# Patient Record
Sex: MALE | Race: WHITE | NOT HISPANIC OR LATINO | Employment: OTHER | URBAN - METROPOLITAN AREA
[De-identification: names, ages, dates, MRNs, and addresses within clinical notes are randomized per-mention and may not be internally consistent; named-entity substitution may affect disease eponyms.]

---

## 2019-01-07 ENCOUNTER — TELEPHONE (OUTPATIENT)
Dept: INTERNAL MEDICINE | Facility: CLINIC | Age: 64
End: 2019-01-07

## 2019-01-07 NOTE — TELEPHONE ENCOUNTER
PT RECEIVED CALL FROM PHARMACY STATING LOSARTAN HAD BEEN RECALLED. TOOK LAST DOSE LAST NIGHT. NEEDS TO HAVE SOMETHING ELSE CALLED IN TO [facility].

## 2019-01-08 RX ORDER — OLMESARTAN MEDOXOMIL 20 MG/1
20 TABLET ORAL DAILY
Qty: 30 TABLET | Refills: 5 | Status: SHIPPED | OUTPATIENT
Start: 2019-01-08 | End: 2019-03-15

## 2019-03-15 ENCOUNTER — TELEPHONE (OUTPATIENT)
Dept: INTERNAL MEDICINE | Facility: CLINIC | Age: 64
End: 2019-03-15

## 2019-03-15 RX ORDER — LOSARTAN POTASSIUM 50 MG/1
50 TABLET ORAL DAILY
Qty: 90 TABLET | Refills: 3 | Status: SHIPPED | OUTPATIENT
Start: 2019-03-15 | End: 2020-03-13

## 2019-03-15 NOTE — TELEPHONE ENCOUNTER
PATIENT HAS BEEN PRESCRIBED OLMESARTAN  AND IT IS ON BACK ORDER    PLEASE ADVISE ON AN ALTERNATIVE DRUG

## 2019-04-26 PROBLEM — F32.A DEPRESSION: Status: ACTIVE | Noted: 2019-04-26

## 2019-04-26 PROBLEM — M50.90 DISC DISORDER OF CERVICAL REGION: Status: ACTIVE | Noted: 2019-04-26

## 2019-04-26 PROBLEM — Z00.00 ANNUAL PHYSICAL EXAM: Status: ACTIVE | Noted: 2019-04-26

## 2019-04-26 PROBLEM — E78.00 HYPERCHOLESTEROLEMIA: Status: ACTIVE | Noted: 2019-04-26

## 2019-04-26 PROBLEM — N18.30 CHRONIC KIDNEY DISEASE, STAGE III (MODERATE): Status: ACTIVE | Noted: 2019-04-26

## 2019-04-26 PROBLEM — I10 BENIGN ESSENTIAL HYPERTENSION: Status: ACTIVE | Noted: 2019-04-26

## 2019-04-26 PROBLEM — K58.9 IRRITABLE BOWEL SYNDROME: Status: ACTIVE | Noted: 2019-04-26

## 2019-04-26 PROBLEM — G47.30 SLEEP APNEA: Status: ACTIVE | Noted: 2019-04-26

## 2019-04-26 PROBLEM — R73.03 PREDIABETES: Status: ACTIVE | Noted: 2019-04-26

## 2019-05-13 ENCOUNTER — OFFICE VISIT (OUTPATIENT)
Dept: INTERNAL MEDICINE | Facility: CLINIC | Age: 64
End: 2019-05-13

## 2019-05-13 ENCOUNTER — LAB (OUTPATIENT)
Dept: LAB | Facility: HOSPITAL | Age: 64
End: 2019-05-13

## 2019-05-13 VITALS
DIASTOLIC BLOOD PRESSURE: 82 MMHG | HEIGHT: 73 IN | HEART RATE: 84 BPM | BODY MASS INDEX: 29.03 KG/M2 | WEIGHT: 219 LBS | SYSTOLIC BLOOD PRESSURE: 144 MMHG

## 2019-05-13 DIAGNOSIS — G47.33 OBSTRUCTIVE SLEEP APNEA SYNDROME: ICD-10-CM

## 2019-05-13 DIAGNOSIS — R73.03 PREDIABETES: ICD-10-CM

## 2019-05-13 DIAGNOSIS — I10 BENIGN ESSENTIAL HYPERTENSION: ICD-10-CM

## 2019-05-13 DIAGNOSIS — E78.00 HYPERCHOLESTEROLEMIA: ICD-10-CM

## 2019-05-13 DIAGNOSIS — F32.5 MAJOR DEPRESSIVE DISORDER WITH SINGLE EPISODE, IN FULL REMISSION (HCC): ICD-10-CM

## 2019-05-13 DIAGNOSIS — Z00.00 ANNUAL PHYSICAL EXAM: Primary | ICD-10-CM

## 2019-05-13 LAB
ALBUMIN SERPL-MCNC: 4.5 G/DL (ref 3.5–5.2)
ALBUMIN UR-MCNC: <1.2 MG/L
ALBUMIN/GLOB SERPL: 1.4 G/DL
ALP SERPL-CCNC: 102 U/L (ref 39–117)
ALT SERPL W P-5'-P-CCNC: 28 U/L (ref 1–41)
ANION GAP SERPL CALCULATED.3IONS-SCNC: 12.4 MMOL/L
AST SERPL-CCNC: 20 U/L (ref 1–40)
BASOPHILS # BLD AUTO: 0.07 10*3/MM3 (ref 0–0.2)
BASOPHILS NFR BLD AUTO: 1.3 % (ref 0–1.5)
BILIRUB SERPL-MCNC: 0.5 MG/DL (ref 0.2–1.2)
BUN BLD-MCNC: 22 MG/DL (ref 8–23)
BUN/CREAT SERPL: 13.8 (ref 7–25)
CALCIUM SPEC-SCNC: 9.2 MG/DL (ref 8.6–10.5)
CHLORIDE SERPL-SCNC: 96 MMOL/L (ref 98–107)
CHOLEST SERPL-MCNC: 169 MG/DL (ref 0–200)
CO2 SERPL-SCNC: 26.6 MMOL/L (ref 22–29)
CREAT BLD-MCNC: 1.59 MG/DL (ref 0.76–1.27)
CREAT UR-MCNC: 192.8 MG/DL
DEPRECATED RDW RBC AUTO: 44.3 FL (ref 37–54)
EOSINOPHIL # BLD AUTO: 0.09 10*3/MM3 (ref 0–0.4)
EOSINOPHIL NFR BLD AUTO: 1.6 % (ref 0.3–6.2)
ERYTHROCYTE [DISTWIDTH] IN BLOOD BY AUTOMATED COUNT: 13 % (ref 12.3–15.4)
GFR SERPL CREATININE-BSD FRML MDRD: 44 ML/MIN/1.73
GLOBULIN UR ELPH-MCNC: 3.3 GM/DL
GLUCOSE BLD-MCNC: 106 MG/DL (ref 65–99)
HBA1C MFR BLD: 5.68 % (ref 4.8–5.6)
HCT VFR BLD AUTO: 46.3 % (ref 37.5–51)
HDLC SERPL-MCNC: 38 MG/DL (ref 40–60)
HGB BLD-MCNC: 15 G/DL (ref 13–17.7)
IMM GRANULOCYTES # BLD AUTO: 0.02 10*3/MM3 (ref 0–0.05)
IMM GRANULOCYTES NFR BLD AUTO: 0.4 % (ref 0–0.5)
LDLC SERPL CALC-MCNC: 94 MG/DL (ref 0–100)
LDLC/HDLC SERPL: 2.48 {RATIO}
LYMPHOCYTES # BLD AUTO: 1.59 10*3/MM3 (ref 0.7–3.1)
LYMPHOCYTES NFR BLD AUTO: 28.6 % (ref 19.6–45.3)
MCH RBC QN AUTO: 30.1 PG (ref 26.6–33)
MCHC RBC AUTO-ENTMCNC: 32.4 G/DL (ref 31.5–35.7)
MCV RBC AUTO: 93 FL (ref 79–97)
MICROALBUMIN/CREAT UR: NORMAL MG/G
MONOCYTES # BLD AUTO: 0.53 10*3/MM3 (ref 0.1–0.9)
MONOCYTES NFR BLD AUTO: 9.5 % (ref 5–12)
NEUTROPHILS # BLD AUTO: 3.25 10*3/MM3 (ref 1.7–7)
NEUTROPHILS NFR BLD AUTO: 58.6 % (ref 42.7–76)
NRBC BLD AUTO-RTO: 0 /100 WBC (ref 0–0.2)
PLATELET # BLD AUTO: 261 10*3/MM3 (ref 140–450)
PMV BLD AUTO: 9.6 FL (ref 6–12)
POTASSIUM BLD-SCNC: 3.7 MMOL/L (ref 3.5–5.2)
PROT SERPL-MCNC: 7.8 G/DL (ref 6–8.5)
RBC # BLD AUTO: 4.98 10*6/MM3 (ref 4.14–5.8)
SODIUM BLD-SCNC: 135 MMOL/L (ref 136–145)
TRIGL SERPL-MCNC: 184 MG/DL (ref 0–150)
VLDLC SERPL-MCNC: 36.8 MG/DL (ref 5–40)
WBC NRBC COR # BLD: 5.55 10*3/MM3 (ref 3.4–10.8)

## 2019-05-13 PROCEDURE — 82043 UR ALBUMIN QUANTITATIVE: CPT

## 2019-05-13 PROCEDURE — 80053 COMPREHEN METABOLIC PANEL: CPT

## 2019-05-13 PROCEDURE — 36415 COLL VENOUS BLD VENIPUNCTURE: CPT

## 2019-05-13 PROCEDURE — 99396 PREV VISIT EST AGE 40-64: CPT | Performed by: INTERNAL MEDICINE

## 2019-05-13 PROCEDURE — 82570 ASSAY OF URINE CREATININE: CPT

## 2019-05-13 PROCEDURE — 85025 COMPLETE CBC W/AUTO DIFF WBC: CPT

## 2019-05-13 PROCEDURE — 83036 HEMOGLOBIN GLYCOSYLATED A1C: CPT

## 2019-05-13 PROCEDURE — 80061 LIPID PANEL: CPT

## 2019-05-13 RX ORDER — ATORVASTATIN CALCIUM 20 MG/1
1 TABLET, FILM COATED ORAL DAILY
COMMUNITY
Start: 2018-11-26 | End: 2019-12-19

## 2019-05-13 RX ORDER — BUPROPION HYDROCHLORIDE 300 MG/1
1 TABLET ORAL DAILY
COMMUNITY
Start: 2018-05-15 | End: 2019-05-29 | Stop reason: SDUPTHER

## 2019-05-13 RX ORDER — SERTRALINE HYDROCHLORIDE 25 MG/1
1 TABLET, FILM COATED ORAL DAILY
COMMUNITY
Start: 2018-05-15 | End: 2019-07-23 | Stop reason: SDUPTHER

## 2019-05-13 RX ORDER — HYDROCHLOROTHIAZIDE 25 MG/1
1 TABLET ORAL DAILY
COMMUNITY
Start: 2018-05-15 | End: 2019-08-06 | Stop reason: SDUPTHER

## 2019-05-13 RX ORDER — POTASSIUM CHLORIDE 750 MG/1
1 TABLET, FILM COATED, EXTENDED RELEASE ORAL DAILY
COMMUNITY
Start: 2018-06-11 | End: 2019-06-14 | Stop reason: SDUPTHER

## 2019-05-13 RX ORDER — AMLODIPINE BESYLATE 10 MG/1
1 TABLET ORAL DAILY
COMMUNITY
Start: 2018-05-15 | End: 2019-07-01 | Stop reason: SDUPTHER

## 2019-05-13 NOTE — PROGRESS NOTES
[facility]     Donta Ledesma  1955   [id number]      Patient Care Team:  Provider, No Known as PCP - General    Chief Complaint::   Chief Complaint   Patient presents with   • Annual Exam        HPI  Mr. ledesma is now 63.  He comes in for follow-up of his hypertension, chronic kidney disease, hyperlipidemia, prediabetes and for annual examination.  He has now retired.  He would like to know what he can do to reduce his medication burden.  His weight is about the same as last year, and he admits he enjoys sweets.  He also admits that other than playing golf and occasional yard work he does not have a regular exercise routine.  Despite that he feels well and has no complaints.  He contemplates doing some volunteer work and perhaps some teaching.    Chronic Conditions:      Patient Active Problem List   Diagnosis   • Irritable bowel syndrome   • Depression   • Annual physical exam   • Hypercholesterolemia   • Benign essential hypertension   • Chronic kidney disease, stage III (moderate) (CMS/HCC)   • Sleep apnea   • Disc disorder of cervical region   • Prediabetes        Past Medical History:   Diagnosis Date   • Benign prostate hyperplasia    • Glaucoma    • Nephrolithiasis        No past surgical history on file.    Family History   Problem Relation Age of Onset   • Hypertension Mother    • Diabetes Father    • Hypertension Brother    • Cancer Maternal Grandfather    • Coronary artery disease Paternal Grandmother    • Stroke Paternal Grandfather        Social History     Socioeconomic History   • Marital status:      Spouse name: Not on file   • Number of children: Not on file   • Years of education: Not on file   • Highest education level: Not on file   Tobacco Use   • Smoking status: Never Smoker       Allergies   Allergen Reactions   • Clarithromycin Other (See Comments)     Unknown;  BIAXIN         Current Outpatient Medications:   •  amLODIPine (NORVASC) 10 MG tablet, Take 1 tablet  "by mouth Daily., Disp: , Rfl:   •  atorvastatin (LIPITOR) 20 MG tablet, Take 1 tablet by mouth Daily., Disp: , Rfl:   •  buPROPion XL (WELLBUTRIN XL) 300 MG 24 hr tablet, Take 1 tablet by mouth Daily., Disp: , Rfl:   •  hydrochlorothiazide (HYDRODIURIL) 25 MG tablet, Take 1 tablet by mouth Daily., Disp: , Rfl:   •  losartan (COZAAR) 50 MG tablet, Take 1 tablet by mouth Daily., Disp: 90 tablet, Rfl: 3  •  potassium chloride (K-DUR) 10 MEQ CR tablet, Take 1 tablet by mouth Daily., Disp: , Rfl:   •  sertraline (ZOLOFT) 25 MG tablet, Take 1 tablet by mouth Daily., Disp: , Rfl:     Immunization History   Administered Date(s) Administered   • Flu Vaccine Quad PF >18YRS 09/13/2018   • Influenza, Unspecified 09/20/2017   • Tdap 02/12/2009   • Zoster, Unspecified 09/29/2016        Health Maintenance Due   Topic Date Due   • ZOSTER VACCINE (1 of 2) 11/30/2004   • HEPATITIS C SCREENING  01/07/2019   • TDAP/TD VACCINES (2 - Td) 02/12/2019   • LIPID PANEL  05/13/2019        Review of Systems   Constitutional: Negative for chills, fatigue and fever.   HENT: Negative for congestion, ear pain and sinus pressure.    Respiratory: Negative for cough, chest tightness, shortness of breath and wheezing.    Cardiovascular: Negative for chest pain and palpitations.   Gastrointestinal: Negative for abdominal pain, blood in stool and constipation.   Skin: Negative for color change.   Allergic/Immunologic: Negative for environmental allergies.   Neurological: Negative for dizziness, speech difficulty and headache.   Psychiatric/Behavioral: Negative for decreased concentration. The patient is not nervous/anxious.         Vital Signs  Vitals:    05/13/19 0838   BP: 144/82   BP Location: Right arm   Patient Position: Sitting   Cuff Size: Adult   Pulse: 84   Weight: 99.3 kg (219 lb)   Height: 184.2 cm (72.5\")       Physical Exam   Constitutional: He is oriented to person, place, and time. He appears well-developed and well-nourished. He appears " overweight.   HENT:   Head: Normocephalic and atraumatic.   Right Ear: External ear normal.   Left Ear: External ear normal.   Nose: Nose normal.   Mouth/Throat: Oropharynx is clear and moist. No oropharyngeal exudate.   Eyes: Conjunctivae and EOM are normal. Pupils are equal, round, and reactive to light.   Neck: Normal range of motion. Neck supple. No JVD present. No thyromegaly present.   Cardiovascular: Normal rate, regular rhythm, normal heart sounds and intact distal pulses. Exam reveals no gallop and no friction rub.   No murmur heard.  Pulmonary/Chest: Effort normal and breath sounds normal. No respiratory distress. He has no wheezes. He has no rales. He exhibits no tenderness.   Abdominal: Soft. Bowel sounds are normal. He exhibits no distension and no mass. There is no tenderness. There is no rebound and no guarding. No hernia.   Musculoskeletal: Normal range of motion. He exhibits no tenderness.   Lymphadenopathy:     He has no cervical adenopathy.   Neurological: He is alert and oriented to person, place, and time. He displays normal reflexes. No cranial nerve deficit or sensory deficit. He exhibits normal muscle tone. Coordination normal.   Skin: Skin is warm and dry. No rash noted. No erythema.   Psychiatric: He has a normal mood and affect. His behavior is normal. Judgment and thought content normal.   Nursing note and vitals reviewed.     Procedures    ACE III MINI             Assessment/Plan:    Donta was seen today for annual exam.    Diagnoses and all orders for this visit:    Annual physical exam    Prediabetes  -     Hemoglobin A1c; Future    Benign essential hypertension  -     Microalbumin / Creatinine Urine Ratio - Urine, Clean Catch; Future  -     CBC & Differential; Future  -     Comprehensive Metabolic Panel; Future    Hypercholesterolemia  -     Lipid Panel; Future    Obstructive sleep apnea syndrome    Major depressive disorder with single episode, in full remission  (CMS/[facility])    Plan    He remains in very good health with fair lifestyle habits.  We discussed at length the importance of weight loss, to a goal of about 200 pounds, and improved physical fitness through intentional physical training both cardiovascular, core and resistance.  He sees urology yearly.  Colonoscopy performed 8/16/2016, due at 5 years.  He may continue use of Benadryl at night to help him sleep.  We also discussed the use of CBD oil for joint and muscle pain, which I think is a reasonable thing for him to try.    Blood pressure remains well controlled on amlodipine, losartan and hydrochlorothiazide.  The most potent way to reduce his antihypertensive medications is to lose weight.    Renal function pending, continue blood pressure control and avoidance of NSAIDs.    Lipid panel pending, continue low-fat diet and atorvastatin.    His mood remains very well controlled on bupropion and sertraline.        Labs  No results found for this or any previous visit.     Counseling was given to patient for the following topics: appropriate exercise 150 minutes per week, disease prevention and healthy eating habits.    Plan of care reviewed with patient at the conclusion of today's visit. Education was provided regarding diagnosis, management, and any prescribed or recommended OTC medications.Patient verbalizes understanding of and agreement with management plan.         Josh Ortiz MD

## 2019-05-30 RX ORDER — BUPROPION HYDROCHLORIDE 300 MG/1
TABLET ORAL
Qty: 90 TABLET | Refills: 1 | Status: SHIPPED | OUTPATIENT
Start: 2019-05-30 | End: 2019-11-25 | Stop reason: SDUPTHER

## 2019-06-17 RX ORDER — POTASSIUM CHLORIDE 750 MG/1
TABLET, FILM COATED, EXTENDED RELEASE ORAL
Qty: 60 TABLET | Refills: 5 | Status: SHIPPED | OUTPATIENT
Start: 2019-06-17 | End: 2020-05-22

## 2019-07-02 RX ORDER — AMLODIPINE BESYLATE 10 MG/1
TABLET ORAL
Qty: 90 TABLET | Refills: 1 | Status: SHIPPED | OUTPATIENT
Start: 2019-07-02 | End: 2019-12-30

## 2019-07-24 RX ORDER — SERTRALINE HYDROCHLORIDE 25 MG/1
TABLET, FILM COATED ORAL
Qty: 90 TABLET | Refills: 1 | Status: SHIPPED | OUTPATIENT
Start: 2019-07-24 | End: 2020-01-21

## 2019-08-07 RX ORDER — HYDROCHLOROTHIAZIDE 25 MG/1
TABLET ORAL
Qty: 90 TABLET | Refills: 1 | Status: SHIPPED | OUTPATIENT
Start: 2019-08-07 | End: 2020-02-04

## 2019-11-26 RX ORDER — BUPROPION HYDROCHLORIDE 300 MG/1
TABLET ORAL
Qty: 90 TABLET | Refills: 1 | Status: SHIPPED | OUTPATIENT
Start: 2019-11-26 | End: 2020-05-22

## 2019-12-19 ENCOUNTER — TELEPHONE (OUTPATIENT)
Dept: INTERNAL MEDICINE | Facility: CLINIC | Age: 64
End: 2019-12-19

## 2019-12-19 RX ORDER — ATORVASTATIN CALCIUM 20 MG/1
20 TABLET, FILM COATED ORAL DAILY
Qty: 90 TABLET | Refills: 2 | Status: CANCELLED | OUTPATIENT
Start: 2019-12-19

## 2019-12-19 RX ORDER — ATORVASTATIN CALCIUM 20 MG/1
TABLET, FILM COATED ORAL
Qty: 90 TABLET | Refills: 2 | Status: SHIPPED | OUTPATIENT
Start: 2019-12-19 | End: 2020-09-16

## 2019-12-30 RX ORDER — AMLODIPINE BESYLATE 10 MG/1
TABLET ORAL
Qty: 90 TABLET | Refills: 0 | Status: SHIPPED | OUTPATIENT
Start: 2019-12-30 | End: 2020-03-31

## 2020-01-21 RX ORDER — SERTRALINE HYDROCHLORIDE 25 MG/1
TABLET, FILM COATED ORAL
Qty: 90 TABLET | Refills: 0 | Status: SHIPPED | OUTPATIENT
Start: 2020-01-21 | End: 2020-04-16

## 2020-02-04 RX ORDER — HYDROCHLOROTHIAZIDE 25 MG/1
TABLET ORAL
Qty: 90 TABLET | Refills: 0 | Status: SHIPPED | OUTPATIENT
Start: 2020-02-04 | End: 2020-05-04

## 2020-03-13 RX ORDER — LOSARTAN POTASSIUM 50 MG/1
TABLET ORAL
Qty: 90 TABLET | Refills: 0 | Status: SHIPPED | OUTPATIENT
Start: 2020-03-13 | End: 2020-05-29 | Stop reason: SDUPTHER

## 2020-03-31 RX ORDER — AMLODIPINE BESYLATE 10 MG/1
TABLET ORAL
Qty: 90 TABLET | Refills: 0 | Status: SHIPPED | OUTPATIENT
Start: 2020-03-31 | End: 2020-05-29 | Stop reason: SDUPTHER

## 2020-04-16 RX ORDER — SERTRALINE HYDROCHLORIDE 25 MG/1
TABLET, FILM COATED ORAL
Qty: 90 TABLET | Refills: 0 | Status: SHIPPED | OUTPATIENT
Start: 2020-04-16 | End: 2020-07-15

## 2020-05-04 RX ORDER — HYDROCHLOROTHIAZIDE 25 MG/1
TABLET ORAL
Qty: 90 TABLET | Refills: 0 | Status: SHIPPED | OUTPATIENT
Start: 2020-05-04 | End: 2020-05-29 | Stop reason: SDUPTHER

## 2020-05-14 ENCOUNTER — LAB (OUTPATIENT)
Dept: LAB | Facility: HOSPITAL | Age: 65
End: 2020-05-14

## 2020-05-14 ENCOUNTER — OFFICE VISIT (OUTPATIENT)
Dept: INTERNAL MEDICINE | Facility: CLINIC | Age: 65
End: 2020-05-14

## 2020-05-14 VITALS
HEIGHT: 73 IN | DIASTOLIC BLOOD PRESSURE: 82 MMHG | WEIGHT: 222.6 LBS | SYSTOLIC BLOOD PRESSURE: 132 MMHG | HEART RATE: 84 BPM | TEMPERATURE: 97.1 F | BODY MASS INDEX: 29.5 KG/M2

## 2020-05-14 DIAGNOSIS — R73.03 PREDIABETES: ICD-10-CM

## 2020-05-14 DIAGNOSIS — I10 BENIGN ESSENTIAL HYPERTENSION: Primary | ICD-10-CM

## 2020-05-14 DIAGNOSIS — E78.00 HYPERCHOLESTEROLEMIA: ICD-10-CM

## 2020-05-14 DIAGNOSIS — F32.5 MAJOR DEPRESSIVE DISORDER WITH SINGLE EPISODE, IN FULL REMISSION (HCC): ICD-10-CM

## 2020-05-14 DIAGNOSIS — I10 BENIGN ESSENTIAL HYPERTENSION: ICD-10-CM

## 2020-05-14 DIAGNOSIS — N18.30 CHRONIC KIDNEY DISEASE, STAGE III (MODERATE) (HCC): ICD-10-CM

## 2020-05-14 LAB
ALBUMIN SERPL-MCNC: 4.5 G/DL (ref 3.5–5.2)
ALBUMIN UR-MCNC: 1.5 MG/DL
ALBUMIN/GLOB SERPL: 1.4 G/DL
ALP SERPL-CCNC: 108 U/L (ref 39–117)
ALT SERPL W P-5'-P-CCNC: 33 U/L (ref 1–41)
ANION GAP SERPL CALCULATED.3IONS-SCNC: 11.7 MMOL/L (ref 5–15)
AST SERPL-CCNC: 24 U/L (ref 1–40)
BASOPHILS # BLD AUTO: 0.08 10*3/MM3 (ref 0–0.2)
BASOPHILS NFR BLD AUTO: 1.4 % (ref 0–1.5)
BILIRUB SERPL-MCNC: 0.7 MG/DL (ref 0.2–1.2)
BUN BLD-MCNC: 26 MG/DL (ref 8–23)
BUN/CREAT SERPL: 15.2 (ref 7–25)
CALCIUM SPEC-SCNC: 9.4 MG/DL (ref 8.6–10.5)
CHLORIDE SERPL-SCNC: 100 MMOL/L (ref 98–107)
CHOLEST SERPL-MCNC: 166 MG/DL (ref 0–200)
CO2 SERPL-SCNC: 27.3 MMOL/L (ref 22–29)
CREAT BLD-MCNC: 1.71 MG/DL (ref 0.76–1.27)
DEPRECATED RDW RBC AUTO: 41.6 FL (ref 37–54)
EOSINOPHIL # BLD AUTO: 0.15 10*3/MM3 (ref 0–0.4)
EOSINOPHIL NFR BLD AUTO: 2.6 % (ref 0.3–6.2)
ERYTHROCYTE [DISTWIDTH] IN BLOOD BY AUTOMATED COUNT: 12.6 % (ref 12.3–15.4)
GFR SERPL CREATININE-BSD FRML MDRD: 40 ML/MIN/1.73
GLOBULIN UR ELPH-MCNC: 3.2 GM/DL
GLUCOSE BLD-MCNC: 106 MG/DL (ref 65–99)
HBA1C MFR BLD: 5.9 % (ref 4.8–5.6)
HCT VFR BLD AUTO: 45.2 % (ref 37.5–51)
HDLC SERPL-MCNC: 40 MG/DL (ref 40–60)
HGB BLD-MCNC: 15.3 G/DL (ref 13–17.7)
IMM GRANULOCYTES # BLD AUTO: 0.03 10*3/MM3 (ref 0–0.05)
IMM GRANULOCYTES NFR BLD AUTO: 0.5 % (ref 0–0.5)
LDLC SERPL CALC-MCNC: 93 MG/DL (ref 0–100)
LDLC/HDLC SERPL: 2.32 {RATIO}
LYMPHOCYTES # BLD AUTO: 1.45 10*3/MM3 (ref 0.7–3.1)
LYMPHOCYTES NFR BLD AUTO: 25 % (ref 19.6–45.3)
MCH RBC QN AUTO: 30.4 PG (ref 26.6–33)
MCHC RBC AUTO-ENTMCNC: 33.8 G/DL (ref 31.5–35.7)
MCV RBC AUTO: 89.7 FL (ref 79–97)
MONOCYTES # BLD AUTO: 0.56 10*3/MM3 (ref 0.1–0.9)
MONOCYTES NFR BLD AUTO: 9.7 % (ref 5–12)
NEUTROPHILS # BLD AUTO: 3.53 10*3/MM3 (ref 1.7–7)
NEUTROPHILS NFR BLD AUTO: 60.8 % (ref 42.7–76)
NRBC BLD AUTO-RTO: 0 /100 WBC (ref 0–0.2)
PLATELET # BLD AUTO: 268 10*3/MM3 (ref 140–450)
PMV BLD AUTO: 9.4 FL (ref 6–12)
POTASSIUM BLD-SCNC: 3.5 MMOL/L (ref 3.5–5.2)
PROT SERPL-MCNC: 7.7 G/DL (ref 6–8.5)
RBC # BLD AUTO: 5.04 10*6/MM3 (ref 4.14–5.8)
SODIUM BLD-SCNC: 139 MMOL/L (ref 136–145)
TRIGL SERPL-MCNC: 167 MG/DL (ref 0–150)
VLDLC SERPL-MCNC: 33.4 MG/DL (ref 5–40)
WBC NRBC COR # BLD: 5.8 10*3/MM3 (ref 3.4–10.8)

## 2020-05-14 PROCEDURE — G0008 ADMIN INFLUENZA VIRUS VAC: HCPCS | Performed by: PHYSICIAN ASSISTANT

## 2020-05-14 PROCEDURE — 82043 UR ALBUMIN QUANTITATIVE: CPT

## 2020-05-14 PROCEDURE — 90670 PCV13 VACCINE IM: CPT | Performed by: PHYSICIAN ASSISTANT

## 2020-05-14 PROCEDURE — 80061 LIPID PANEL: CPT

## 2020-05-14 PROCEDURE — 80053 COMPREHEN METABOLIC PANEL: CPT

## 2020-05-14 PROCEDURE — 85025 COMPLETE CBC W/AUTO DIFF WBC: CPT

## 2020-05-14 PROCEDURE — G0403 EKG FOR INITIAL PREVENT EXAM: HCPCS | Performed by: PHYSICIAN ASSISTANT

## 2020-05-14 PROCEDURE — G0402 INITIAL PREVENTIVE EXAM: HCPCS | Performed by: PHYSICIAN ASSISTANT

## 2020-05-14 PROCEDURE — 83036 HEMOGLOBIN GLYCOSYLATED A1C: CPT

## 2020-05-14 NOTE — PROGRESS NOTES
The ABCs of the Annual Wellness Visit  Welcome to Medicare Visit    Chief Complaint   Patient presents with   • Annual Exam     patient is fasting       Subjective   History of Present Illness:  Donta Ledesma is a 64 y.o. male who presents for a  Welcome to Medicare Visit. He is current on colonoscopy and scheduled for a repeat next year.  He is due for Prevnar 13 today.  He is due for fasting labs.      HEALTH RISK ASSESSMENT    Recent Hospitalizations:  No hospitalization(s) within the last year.    Current Medical Providers:  Patient Care Team:  Josh Ortiz MD as PCP - General (Internal Medicine)  Shankar Woodward MD as Consulting Physician (Urology)  Ryan Lopez MD as Consulting Physician (Gastroenterology)  Sanjay Woody MD as Consulting Physician (Ophthalmology)  Darryl Hayes MD as Consulting Physician (Ophthalmology)    Smoking Status:  Social History     Tobacco Use   Smoking Status Never Smoker       Alcohol Consumption:  Social History     Substance and Sexual Activity   Alcohol Use Not on file       Depression Screen:   PHQ-2/PHQ-9 Depression Screening 5/14/2020   Little interest or pleasure in doing things 0   Feeling down, depressed, or hopeless 0   Total Score 0       Fall Risk Screen:  STEADI Fall Risk Assessment has not been completed.    Health Habits and Functional and Cognitive Screening:  Functional & Cognitive Status 5/14/2020   Do you have difficulty preparing food and eating? No   Do you have difficulty bathing yourself, getting dressed or grooming yourself? No   Do you have difficulty using the toilet? No   Do you have difficulty moving around from place to place? No   Do you have trouble with steps or getting out of a bed or a chair? No   Current Diet Well Balanced Diet   Dental Exam Up to date   Eye Exam Up to date   Exercise (times per week) 4 times per week   Current Exercise Activities Include Walking   Do you need help using the phone?  No   Are  you deaf or do you have serious difficulty hearing?  No   Do you need help with transportation? No   Do you need help shopping? No   Do you need help preparing meals?  No   Do you need help with housework?  No   Do you need help with laundry? No   Do you need help taking your medications? No   Do you need help managing money? No   Do you ever drive or ride in a car without wearing a seat belt? No   Have you felt unusual stress, anger or loneliness in the last month? No   Who do you live with? Spouse   If you need help, do you have trouble finding someone available to you? No   Have you been bothered in the last four weeks by sexual problems? No   Do you have difficulty concentrating, remembering or making decisions? No         Does the patient have evidence of cognitive impairment? No    Asprin use counseling:Does not need ASA (and currently is not on it)    Visual Acuity:     Visual Acuity Screening    Right eye Left eye Both eyes   Without correction: 20/70 20/70 20/50   With correction:          Age-appropriate Screening Schedule:  Refer to the list below for future screening recommendations based on patient's age, sex and/or medical conditions. Orders for these recommended tests are listed in the plan section. The patient has been provided with a written plan.    Health Maintenance   Topic Date Due   • ZOSTER VACCINE (1 of 2) 11/24/2016   • TDAP/TD VACCINES (2 - Td) 02/12/2019   • PNEUMOCOCCAL VACCINE (65+ HIGH RISK) (1 of 2 - PCV13) 11/30/2019   • LIPID PANEL  05/13/2020   • INFLUENZA VACCINE  08/01/2020   • COLONOSCOPY  08/18/2021          The following portions of the patient's history were reviewed and updated as appropriate: allergies, current medications, past family history, past medical history, past social history, past surgical history and problem list.    Outpatient Medications Prior to Visit   Medication Sig Dispense Refill   • amLODIPine (NORVASC) 10 MG tablet TAKE ONE TABLET BY MOUTH DAILY 90  tablet 0   • atorvastatin (LIPITOR) 20 MG tablet TAKE ONE TABLET BY MOUTH DAILY 90 tablet 2   • buPROPion XL (WELLBUTRIN XL) 300 MG 24 hr tablet TAKE ONE TABLET BY MOUTH DAILY 90 tablet 1   • hydroCHLOROthiazide (HYDRODIURIL) 25 MG tablet TAKE ONE TABLET BY MOUTH DAILY 90 tablet 0   • losartan (COZAAR) 50 MG tablet TAKE ONE TABLET BY MOUTH DAILY 90 tablet 0   • potassium chloride (K-DUR) 10 MEQ CR tablet TAKE TWO TABLETS BY MOUTH DAILY WITH FOOD 60 tablet 5   • sertraline (ZOLOFT) 25 MG tablet TAKE ONE TABLET BY MOUTH DAILY 90 tablet 0     No facility-administered medications prior to visit.        Patient Active Problem List   Diagnosis   • Irritable bowel syndrome   • Depression   • Annual physical exam   • Hypercholesterolemia   • Benign essential hypertension   • Chronic kidney disease, stage III (moderate) (CMS/HCC)   • Sleep apnea   • Disc disorder of cervical region   • Prediabetes   • Major depressive disorder with single episode, in full remission (CMS/HCC)       Advanced Care Planning:  ACP discussion was held with the patient during this visit. Patient has an advance directive (not in EMR), copy requested.    Review of Systems   Constitutional: Negative for chills, fatigue and fever.   HENT: Negative for congestion, ear pain and sinus pressure.    Respiratory: Negative for cough, chest tightness, shortness of breath and wheezing.    Cardiovascular: Negative for chest pain and palpitations.   Gastrointestinal: Negative for abdominal pain, blood in stool and constipation.   Skin: Negative for color change.   Allergic/Immunologic: Negative for environmental allergies.   Neurological: Negative for dizziness, speech difficulty and headaches.   Psychiatric/Behavioral: Negative for confusion. The patient is not nervous/anxious.        Compared to one year ago, the patient feels his physical health is the same.  Compared to one year ago, the patient feels his mental health is the same.    Reviewed chart for  "potential of high risk medication in the elderly: yes  Reviewed chart for potential of harmful drug interactions in the elderly:yes    Objective         Vitals:    05/14/20 0907   BP: 132/82   BP Location: Left arm   Patient Position: Sitting   Cuff Size: Adult   Pulse: 84   Temp: 97.1 °F (36.2 °C)   Weight: 101 kg (222 lb 9.6 oz)   Height: 184.2 cm (72.5\")   PainSc: 0-No pain       Body mass index is 29.78 kg/m².  Discussed the patient's BMI with him. The BMI is above average; BMI management plan is completed.    Physical Exam   Psychiatric: He has a normal mood and affect. His behavior is normal. Judgment and thought content normal.               ECG 12 Lead  Date/Time: 5/14/2020 10:58 AM  Performed by: Lanny Dee PA-C  Authorized by: Lanny Dee PA-C   Rhythm: sinus rhythm  Conduction: incomplete right bundle branch block  Comments: Borderline EKG with no EKG available for comparison.            Assessment/Plan   Medicare Risks and Personalized Health Plan  CMS Preventative Services Quick Reference  Cardiovascular risk    The above risks/problems have been discussed with the patient.  Pertinent information has been shared with the patient in the After Visit Summary.  Follow up plans and orders are seen below in the Assessment/Plan Section.    Diagnoses and all orders for this visit:    1. Benign essential hypertension (Primary)  Assessment & Plan:  Hypertension is improving with treatment.  Continue current treatment regimen.  Dietary sodium restriction.  Weight loss.  Regular aerobic exercise.  Continue current medications.  Ambulatory blood pressure monitoring.  Blood pressure will be reassessed at the next regular appointment.    Orders:  -     CBC & Differential; Future  -     ECG 12 Lead    2. Prediabetes  Assessment & Plan:  Discussed importance of a low carbohydrate, low-fat, low sugar diet.  Continue walking most days of the week.    Orders:  -     Comprehensive Metabolic Panel; " Future  -     Hemoglobin A1c; Future    3. Hypercholesterolemia  -     Lipid Panel; Future  -     MicroAlbumin, Urine, Random - Urine, Clean Catch; Future    4. Chronic kidney disease, stage III (moderate) (CMS/[facility])  -     Comprehensive Metabolic Panel; Future    5. Major depressive disorder with single episode, in full remission (CMS/[facility])  Comments:  Stable.    Other orders  -     Pneumococcal Conjugate Vaccine 13-Valent All      Follow Up:  Return in about 1 week (around 5/21/2020) for Annual physical.     An After Visit Summary and PPPS were given to the patient.     Plan of care reviewed with patient at the conclusion of today's visit. Education was provided regarding diagnosis, management and any prescribed or recommended OTC medications.  Patient verbalizes understanding of and agreement with management plan.     Return if symptoms worsen or fail to improve.    Lanny Dee

## 2020-05-14 NOTE — ASSESSMENT & PLAN NOTE
Discussed importance of a low carbohydrate, low-fat, low sugar diet.  Continue walking most days of the week.

## 2020-05-14 NOTE — PATIENT INSTRUCTIONS
Advance Directive    Advance directives are legal documents that let you make choices ahead of time about your health care and medical treatment in case you become unable to communicate for yourself. Advance directives are a way for you to communicate your wishes to family, friends, and health care providers. This can help convey your decisions about end-of-life care if you become unable to communicate.  Discussing and writing advance directives should happen over time rather than all at once. Advance directives can be changed depending on your situation and what you want, even after you have signed the advance directives.  If you do not have an advance directive, some states assign family decision makers to act on your behalf based on how closely you are related to them. Each state has its own laws regarding advance directives. You may want to check with your health care provider, , or state representative about the laws in your state. There are different types of advance directives, such as:  · Medical power of .  · Living will.  · Do not resuscitate (DNR) or do not attempt resuscitation (DNAR) order.  Health care proxy and medical power of   A health care proxy, also called a health care agent, is a person who is appointed to make medical decisions for you in cases in which you are unable to make the decisions yourself. Generally, people choose someone they know well and trust to represent their preferences. Make sure to ask this person for an agreement to act as your proxy. A proxy may have to exercise judgment in the event of a medical decision for which your wishes are not known.  A medical power of  is a legal document that names your health care proxy. Depending on the laws in your state, after the document is written, it may also need to be:  · Signed.  · Notarized.  · Dated.  · Copied.  · Witnessed.  · Incorporated into your medical record.  You may also want to appoint  someone to manage your financial affairs in a situation in which you are unable to do so. This is called a durable power of  for finances. It is a separate legal document from the durable power of  for health care. You may choose the same person or someone different from your health care proxy to act as your agent in financial matters.  If you do not appoint a proxy, or if there is a concern that the proxy is not acting in your best interests, a court-appointed guardian may be designated to act on your behalf.  Living will  A living will is a set of instructions documenting your wishes about medical care when you cannot express them yourself. Health care providers should keep a copy of your living will in your medical record. You may want to give a copy to family members or friends. To alert caregivers in case of an emergency, you can place a card in your wallet to let them know that you have a living will and where they can find it. A living will is used if you become:  · Terminally ill.  · Incapacitated.  · Unable to communicate or make decisions.  Items to consider in your living will include:  · The use or non-use of life-sustaining equipment, such as dialysis machines and breathing machines (ventilators).  · A DNR or DNAR order, which is the instruction not to use cardiopulmonary resuscitation (CPR) if breathing or heartbeat stops.  · The use or non-use of tube feeding.  · Withholding of food and fluids.  · Comfort (palliative) care when the goal becomes comfort rather than a cure.  · Organ and tissue donation.  A living will does not give instructions for distributing your money and property if you should pass away. It is recommended that you seek the advice of a  when writing a will. Decisions about taxes, beneficiaries, and asset distribution will be legally binding. This process can relieve your family and friends of any concerns surrounding disputes or questions that may come up about  the distribution of your assets.  DNR or DNAR  A DNR or DNAR order is a request not to have CPR in the event that your heart stops beating or you stop breathing. If a DNR or DNAR order has not been made and shared, a health care provider will try to help any patient whose heart has stopped or who has stopped breathing. If you plan to have surgery, talk with your health care provider about how your DNR or DNAR order will be followed if problems occur.  Summary  · Advance directives are the legal documents that allow you to make choices ahead of time about your health care and medical treatment in case you become unable to communicate for yourself.  · The process of discussing and writing advance directives should happen over time. You can change the advance directives, even after you have signed them.  · Advance directives include DNR or DNAR orders, living reyes, and designating an agent as your medical power of .  This information is not intended to replace advice given to you by your health care provider. Make sure you discuss any questions you have with your health care provider.  Document Released: 2009 Document Revised: 2017 Document Reviewed: 2017  [employer] Interactive Patient Education ©  [employer]    Medicare Wellness  Personal Prevention Plan of Service     Date of Office Visit:  2020  Encounter Provider:  Lanny Dee PA-C  Place of Service:  [facility]  Patient Name: Donta Ledesma  :  1955    As part of the Medicare Wellness portion of your visit today, we are providing you with this personalized preventive plan of services (PPPS). This plan is based upon recommendations of the [place] Preventive Services Task Force (USPSTF) and the Advisory Committee on Immunization Practices (ACIP).    This lists the preventive care services that should be considered, and provides dates of when you are due. Items listed  "as completed are up-to-date and do not require any further intervention.    Health Maintenance   Topic Date Due   • ZOSTER VACCINE (1 of 2) 11/30/2004   • HEPATITIS C SCREENING  01/07/2019   • MEDICARE ANNUAL WELLNESS  01/07/2019   • TDAP/TD VACCINES (2 - Td) 02/12/2019   • PNEUMOCOCCAL VACCINE (65+ HIGH RISK) (1 of 2 - PCV13) 11/30/2019   • LIPID PANEL  05/13/2020   • INFLUENZA VACCINE  08/01/2020   • COLONOSCOPY  08/18/2021       No orders of the defined types were placed in this encounter.      No follow-ups on file.        BMI for Adults    Body mass index (BMI) is a number that is calculated from a person's weight and height. BMI may help to estimate how much of a person's weight is composed of fat. BMI can help identify those who may be at higher risk for certain medical problems.  How is BMI used with adults?  BMI is used as a screening tool to identify possible weight problems. It is used to check whether a person is obese, overweight, healthy weight, or underweight.  How is BMI calculated?  BMI measures your weight and compares it to your height. This can be done either in English (U[place] or metric measurements. Note that charts are available to help you find your BMI quickly and easily without having to do these calculations yourself.  To calculate your BMI in English (U.S[place] measurements, your health care provider will:  1. Measure your weight in pounds (lb).  2. Multiply the number of pounds by 703.  ? For example, for a person who weighs 180 lb, multiply that number by 703, which equals 126,540.  3. Measure your height in inches (in). Then multiply that number by itself to get a measurement called \"inches squared.\"  ? For example, for a person who is 70 in tall, the \"inches squared\" measurement is 70 in x 70 in, which equals 4900 inches squared.  4. Divide the total from Step 2 (number of lb x 703) by the total from Step 3 (inches squared): 126,540 ÷ 4900 = 25.8. This is your BMI.  To calculate your BMI " "in metric measurements, your health care provider will:  1. Measure your weight in kilograms (kg).  2. Measure your height in meters (m). Then multiply that number by itself to get a measurement called \"meters squared.\"  ? For example, for a person who is 1.75 m tall, the \"meters squared\" measurement is 1.75 m x 1.75 m, which is equal to 3.1 meters squared.  3. Divide the number of kilograms (your weight) by the meters squared number. In this example: 70 ÷ 3.1 = 22.6. This is your BMI.  How is BMI interpreted?  To interpret your results, your health care provider will use BMI charts to identify whether you are underweight, normal weight, overweight, or obese. The following guidelines will be used:  · Underweight: BMI less than 18.5.  · Normal weight: BMI between 18.5 and 24.9.  · Overweight: BMI between 25 and 29.9.  · Obese: BMI of 30 and above.  Please note:  · Weight includes both fat and muscle, so someone with a muscular build, such as an athlete, may have a BMI that is higher than 24.9. In cases like these, BMI is not an accurate measure of body fat.  · To determine if excess body fat is the cause of a BMI of 25 or higher, further assessments may need to be done by a health care provider.  · BMI is usually interpreted in the same way for men and women.  Why is BMI a useful tool?  BMI is useful in two ways:  · Identifying a weight problem that may be related to a medical condition, or that may increase the risk for medical problems.  · Promoting lifestyle and diet changes in order to reach a healthy weight.  Summary  · Body mass index (BMI) is a number that is calculated from a person's weight and height.  · BMI may help to estimate how much of a person's weight is composed of fat. BMI can help identify those who may be at higher risk for certain medical problems.  · BMI can be measured using English measurements or metric measurements.  · To interpret your results, your health care provider will use BMI charts " to identify whether you are underweight, normal weight, overweight, or obese.  This information is not intended to replace advice given to you by your health care provider. Make sure you discuss any questions you have with your health care provider.  Document Released: 08/29/2005 Document Revised: 10/31/2018 Document Reviewed: 10/31/2018  [employer] Interactive Patient Education © 2020 Elsevier Inc.

## 2020-05-22 RX ORDER — POTASSIUM CHLORIDE 750 MG/1
TABLET, FILM COATED, EXTENDED RELEASE ORAL
Qty: 180 TABLET | Refills: 0 | Status: SHIPPED | OUTPATIENT
Start: 2020-05-22 | End: 2020-05-29 | Stop reason: SDUPTHER

## 2020-05-22 RX ORDER — BUPROPION HYDROCHLORIDE 300 MG/1
TABLET ORAL
Qty: 90 TABLET | Refills: 0 | Status: SHIPPED | OUTPATIENT
Start: 2020-05-22 | End: 2020-05-29 | Stop reason: SDUPTHER

## 2020-05-29 ENCOUNTER — OFFICE VISIT (OUTPATIENT)
Dept: INTERNAL MEDICINE | Facility: CLINIC | Age: 65
End: 2020-05-29

## 2020-05-29 ENCOUNTER — LAB (OUTPATIENT)
Dept: LAB | Facility: HOSPITAL | Age: 65
End: 2020-05-29

## 2020-05-29 VITALS
BODY MASS INDEX: 29.29 KG/M2 | HEART RATE: 68 BPM | TEMPERATURE: 97.8 F | WEIGHT: 221 LBS | SYSTOLIC BLOOD PRESSURE: 147 MMHG | HEIGHT: 73 IN | DIASTOLIC BLOOD PRESSURE: 88 MMHG

## 2020-05-29 DIAGNOSIS — F32.5 MAJOR DEPRESSIVE DISORDER WITH SINGLE EPISODE, IN FULL REMISSION (HCC): ICD-10-CM

## 2020-05-29 DIAGNOSIS — E78.00 HYPERCHOLESTEROLEMIA: ICD-10-CM

## 2020-05-29 DIAGNOSIS — K58.0 IRRITABLE BOWEL SYNDROME WITH DIARRHEA: ICD-10-CM

## 2020-05-29 DIAGNOSIS — N18.30 CHRONIC KIDNEY DISEASE, STAGE III (MODERATE) (HCC): ICD-10-CM

## 2020-05-29 DIAGNOSIS — I10 BENIGN ESSENTIAL HYPERTENSION: ICD-10-CM

## 2020-05-29 DIAGNOSIS — I10 BENIGN ESSENTIAL HYPERTENSION: Primary | ICD-10-CM

## 2020-05-29 PROCEDURE — 80048 BASIC METABOLIC PNL TOTAL CA: CPT

## 2020-05-29 PROCEDURE — 99214 OFFICE O/P EST MOD 30 MIN: CPT | Performed by: INTERNAL MEDICINE

## 2020-05-29 RX ORDER — HYDROCHLOROTHIAZIDE 25 MG/1
25 TABLET ORAL DAILY
Qty: 90 TABLET | Refills: 3 | Status: SHIPPED | OUTPATIENT
Start: 2020-05-29 | End: 2020-11-17 | Stop reason: SDUPTHER

## 2020-05-29 RX ORDER — BUPROPION HYDROCHLORIDE 300 MG/1
300 TABLET ORAL DAILY
Qty: 90 TABLET | Refills: 3 | Status: SHIPPED | OUTPATIENT
Start: 2020-05-29 | End: 2021-06-01 | Stop reason: SDUPTHER

## 2020-05-29 RX ORDER — LOSARTAN POTASSIUM 50 MG/1
50 TABLET ORAL DAILY
Qty: 90 TABLET | Refills: 3 | Status: SHIPPED | OUTPATIENT
Start: 2020-05-29 | End: 2020-11-17

## 2020-05-29 RX ORDER — AMLODIPINE BESYLATE 10 MG/1
10 TABLET ORAL DAILY
Qty: 90 TABLET | Refills: 3 | Status: SHIPPED | OUTPATIENT
Start: 2020-05-29 | End: 2021-06-01 | Stop reason: SDUPTHER

## 2020-05-29 RX ORDER — POTASSIUM CHLORIDE 750 MG/1
20 TABLET, FILM COATED, EXTENDED RELEASE ORAL 2 TIMES DAILY
Qty: 180 TABLET | Refills: 0 | Status: SHIPPED | OUTPATIENT
Start: 2020-05-29 | End: 2020-11-23

## 2020-05-29 NOTE — PROGRESS NOTES
[facility]     Donta Ledesma  1955   [id number]      Patient Care Team:  Josh Ortiz MD as PCP - General (Internal Medicine)  Shankar Woodward MD as Consulting Physician (Urology)  Ryan Lopez MD as Consulting Physician (Gastroenterology)  Darryl Hayes MD as Consulting Physician (Ophthalmology)    Chief Complaint::   Chief Complaint   Patient presents with   • Hypertension     AWV part 2         HPI  Mr. Ledesma is now 64.  He comes in for follow-up of his hypertension, chronic kidney disease, hyperlipidemia, irritable bowel syndrome and depression.  Overall he feels well.  He has been very active this spring.  He is played some golf and done yard work.  He walks every day.  He is being careful by wearing a mask in public and social distancing.  There is no fever, cough, shortness of breath or chest pain.  He continues to avoid NSAIDs.  Chronic Conditions:      Patient Active Problem List   Diagnosis   • Irritable bowel syndrome   • Depression   • Annual physical exam   • Hypercholesterolemia   • Benign essential hypertension   • Chronic kidney disease, stage III (moderate) (CMS/HCC)   • Sleep apnea   • Disc disorder of cervical region   • Prediabetes   • Major depressive disorder with single episode, in full remission (CMS/HCC)        Past Medical History:   Diagnosis Date   • Benign prostate hyperplasia    • Glaucoma    • Nephrolithiasis        No past surgical history on file.    Family History   Problem Relation Age of Onset   • Hypertension Mother    • Diabetes Father    • Hypertension Brother    • Cancer Maternal Grandfather    • Coronary artery disease Paternal Grandmother    • Stroke Paternal Grandfather        Social History     Socioeconomic History   • Marital status:      Spouse name: Not on file   • Number of children: Not on file   • Years of education: Not on file   • Highest education level: Not on file   Tobacco Use   • Smoking status:  "Never Smoker       Allergies   Allergen Reactions   • Clarithromycin Other (See Comments)     Unknown;  BIAXIN         Current Outpatient Medications:   •  amLODIPine (NORVASC) 10 MG tablet, Take 1 tablet by mouth Daily., Disp: 90 tablet, Rfl: 3  •  atorvastatin (LIPITOR) 20 MG tablet, TAKE ONE TABLET BY MOUTH DAILY, Disp: 90 tablet, Rfl: 2  •  buPROPion XL (WELLBUTRIN XL) 300 MG 24 hr tablet, Take 1 tablet by mouth Daily., Disp: 90 tablet, Rfl: 3  •  hydroCHLOROthiazide (HYDRODIURIL) 25 MG tablet, Take 1 tablet by mouth Daily., Disp: 90 tablet, Rfl: 3  •  losartan (COZAAR) 50 MG tablet, Take 1 tablet by mouth Daily., Disp: 90 tablet, Rfl: 3  •  potassium chloride (K-DUR) 10 MEQ CR tablet, Take 2 tablets by mouth 2 (Two) Times a Day., Disp: 180 tablet, Rfl: 0  •  sertraline (ZOLOFT) 25 MG tablet, TAKE ONE TABLET BY MOUTH DAILY, Disp: 90 tablet, Rfl: 0    Review of Systems   Constitutional: Negative for chills, fatigue and fever.   HENT: Negative for congestion, ear pain and sinus pressure.    Respiratory: Negative for cough, chest tightness, shortness of breath and wheezing.    Cardiovascular: Negative for chest pain and palpitations.   Gastrointestinal: Negative for abdominal pain, blood in stool and constipation.   Skin: Negative for color change.   Allergic/Immunologic: Negative for environmental allergies.   Neurological: Negative for dizziness, speech difficulty and headache.   Psychiatric/Behavioral: Negative for decreased concentration. The patient is not nervous/anxious.         Vital Signs  Vitals:    05/29/20 1403   BP: 147/88   BP Location: Left arm   Patient Position: Sitting   Cuff Size: Adult   Pulse: 68   Temp: 97.8 °F (36.6 °C)   TempSrc: Temporal   Weight: 100 kg (221 lb)   Height: 184.2 cm (72.52\")   PainSc: 0-No pain       Physical Exam   Constitutional: He is oriented to person, place, and time. He appears well-developed and well-nourished.   HENT:   Head: Normocephalic and atraumatic.   Right " Ear: External ear normal.   Left Ear: External ear normal.   Nose: Nose normal.   Mouth/Throat: Oropharynx is clear and moist. No oropharyngeal exudate.   Eyes: Pupils are equal, round, and reactive to light. Conjunctivae and EOM are normal.   Neck: Normal range of motion. Neck supple. No JVD present. No thyromegaly present.   Cardiovascular: Normal rate, regular rhythm, normal heart sounds and intact distal pulses. Exam reveals no gallop and no friction rub.   No murmur heard.  Pulmonary/Chest: Effort normal and breath sounds normal. No respiratory distress. He has no wheezes. He has no rales. He exhibits no tenderness.   Abdominal: Soft. Bowel sounds are normal. He exhibits no distension and no mass. There is no tenderness. There is no rebound and no guarding. No hernia.   Musculoskeletal: Normal range of motion. He exhibits no tenderness.   Lymphadenopathy:     He has no cervical adenopathy.   Neurological: He is alert and oriented to person, place, and time. He displays normal reflexes. No cranial nerve deficit or sensory deficit. He exhibits normal muscle tone. Coordination normal.   Skin: Skin is warm and dry. No rash noted. No erythema.   Psychiatric: He has a normal mood and affect. His behavior is normal. Judgment and thought content normal.   Nursing note and vitals reviewed.     Procedures    ACE III MINI             Assessment/Plan:    Donta was seen today for hypertension.    Diagnoses and all orders for this visit:    Benign essential hypertension  -     Basic Metabolic Panel; Future    Hypercholesterolemia    Irritable bowel syndrome with diarrhea    Chronic kidney disease, stage III (moderate) (CMS/HCC)    Major depressive disorder with single episode, in full remission (CMS/HCC)    Other orders  -     amLODIPine (NORVASC) 10 MG tablet; Take 1 tablet by mouth Daily.  -     buPROPion XL (WELLBUTRIN XL) 300 MG 24 hr tablet; Take 1 tablet by mouth Daily.  -     hydroCHLOROthiazide (HYDRODIURIL) 25 MG  tablet; Take 1 tablet by mouth Daily.  -     losartan (COZAAR) 50 MG tablet; Take 1 tablet by mouth Daily.  -     potassium chloride (K-DUR) 10 MEQ CR tablet; Take 2 tablets by mouth 2 (Two) Times a Day.    Plan    Overall he remains in very good health with good lifestyle habits.  He is due for colonoscopy next year.  He will follow-up with urology for yearly prostate cancer screening and with nephrology as scheduled.    Blood pressure is adequately controlled on losartan, hydrochlorothiazide and amlodipine.    Lipids are well controlled on atorvastatin and healthy diet.    IBS is stable at present.    GFR is dropped from 44-40.  He sees nephrology in September but I have asked him to have his renal function repeated well-hydrated to make sure were not seeing more rapid progression.    Depression is well controlled on very low-dose of sertraline.        Plan of care reviewed with patient at the conclusion of today's visit. Education was provided regarding diagnosis, management, and any prescribed or recommended OTC medications.Patient verbalizes understanding of and agreement with management plan.         Josh Ortiz MD           Answers for HPI/ROS submitted by the patient on 5/22/2020   What is the primary reason for your visit?: Physical

## 2020-05-30 LAB
ANION GAP SERPL CALCULATED.3IONS-SCNC: 12.9 MMOL/L (ref 5–15)
BUN BLD-MCNC: 31 MG/DL (ref 8–23)
BUN/CREAT SERPL: 15.7 (ref 7–25)
CALCIUM SPEC-SCNC: 9.2 MG/DL (ref 8.6–10.5)
CHLORIDE SERPL-SCNC: 101 MMOL/L (ref 98–107)
CO2 SERPL-SCNC: 26.1 MMOL/L (ref 22–29)
CREAT BLD-MCNC: 1.98 MG/DL (ref 0.76–1.27)
GFR SERPL CREATININE-BSD FRML MDRD: 34 ML/MIN/1.73
GLUCOSE BLD-MCNC: 98 MG/DL (ref 65–99)
POTASSIUM BLD-SCNC: 3.8 MMOL/L (ref 3.5–5.2)
SODIUM BLD-SCNC: 140 MMOL/L (ref 136–145)

## 2020-07-15 RX ORDER — SERTRALINE HYDROCHLORIDE 25 MG/1
TABLET, FILM COATED ORAL
Qty: 90 TABLET | Refills: 1 | Status: SHIPPED | OUTPATIENT
Start: 2020-07-15 | End: 2021-01-18

## 2020-09-16 RX ORDER — ATORVASTATIN CALCIUM 20 MG/1
TABLET, FILM COATED ORAL
Qty: 90 TABLET | Refills: 1 | Status: SHIPPED | OUTPATIENT
Start: 2020-09-16 | End: 2021-03-15

## 2020-10-21 ENCOUNTER — FLU SHOT (OUTPATIENT)
Dept: INTERNAL MEDICINE | Facility: CLINIC | Age: 65
End: 2020-10-21

## 2020-10-21 DIAGNOSIS — Z23 NEED FOR INFLUENZA VACCINATION: ICD-10-CM

## 2020-10-21 PROCEDURE — 90694 VACC AIIV4 NO PRSRV 0.5ML IM: CPT | Performed by: INTERNAL MEDICINE

## 2020-10-21 PROCEDURE — G0008 ADMIN INFLUENZA VIRUS VAC: HCPCS | Performed by: INTERNAL MEDICINE

## 2020-11-17 ENCOUNTER — OFFICE VISIT (OUTPATIENT)
Dept: INTERNAL MEDICINE | Facility: CLINIC | Age: 65
End: 2020-11-17

## 2020-11-17 VITALS
HEIGHT: 73 IN | SYSTOLIC BLOOD PRESSURE: 156 MMHG | BODY MASS INDEX: 27.57 KG/M2 | DIASTOLIC BLOOD PRESSURE: 90 MMHG | TEMPERATURE: 97.5 F | HEART RATE: 88 BPM | WEIGHT: 208 LBS

## 2020-11-17 DIAGNOSIS — I10 BENIGN ESSENTIAL HYPERTENSION: Primary | ICD-10-CM

## 2020-11-17 DIAGNOSIS — N18.32 STAGE 3B CHRONIC KIDNEY DISEASE (HCC): ICD-10-CM

## 2020-11-17 PROCEDURE — 99213 OFFICE O/P EST LOW 20 MIN: CPT | Performed by: INTERNAL MEDICINE

## 2020-11-17 RX ORDER — DOXAZOSIN MESYLATE 1 MG/1
2 TABLET ORAL NIGHTLY
COMMUNITY
End: 2020-11-17 | Stop reason: SDUPTHER

## 2020-11-17 RX ORDER — HYDROCHLOROTHIAZIDE 12.5 MG/1
12.5 TABLET ORAL DAILY
Qty: 30 TABLET | Refills: 5 | Status: SHIPPED | OUTPATIENT
Start: 2020-11-17 | End: 2021-05-26

## 2020-11-17 RX ORDER — DOXAZOSIN MESYLATE 4 MG/1
4 TABLET ORAL NIGHTLY
Qty: 30 TABLET | Refills: 5 | Status: SHIPPED | OUTPATIENT
Start: 2020-11-17 | End: 2021-06-01 | Stop reason: SDUPTHER

## 2020-11-17 NOTE — PROGRESS NOTES
[facility]     Donta Ledesma  1955   [id number]      Patient Care Team:  Josh Ortiz MD as PCP - General (Internal Medicine)  Shankar Woodward MD as Consulting Physician (Urology)  Ryan Lopez MD as Consulting Physician (Gastroenterology)  Darryl Hayes MD as Consulting Physician (Ophthalmology)    Chief Complaint::   Chief Complaint   Patient presents with   • Blood pressure issues        HPI  Mr. Ledesma comes in concerned about his blood pressure control and his chronic kidney disease.  Nephrology reduce his hydrochlorothiazide and discontinued losartan earlier this fall.  They replaced it with 1 mg of doxazosin which was subsequently increased to.  His blood pressure is uncontrolled on that dose and he has been waiting for 2 weeks for response to his call.  He would like to have a second opinion.    Chronic Conditions:      Patient Active Problem List   Diagnosis   • Irritable bowel syndrome   • Depression   • Annual physical exam   • Hypercholesterolemia   • Benign essential hypertension   • Chronic kidney disease, stage III (moderate)   • Sleep apnea   • Disc disorder of cervical region   • Prediabetes   • Major depressive disorder with single episode, in full remission (CMS/[facility])        Past Medical History:   Diagnosis Date   • Benign prostate hyperplasia    • Glaucoma    • Nephrolithiasis        No past surgical history on file.    Family History   Problem Relation Age of Onset   • Hypertension Mother    • Diabetes Father    • Hypertension Brother    • Cancer Maternal Grandfather    • Coronary artery disease Paternal Grandmother    • Stroke Paternal Grandfather        Social History     Socioeconomic History   • Marital status:      Spouse name: Not on file   • Number of children: Not on file   • Years of education: Not on file   • Highest education level: Not on file   Tobacco Use   • Smoking status: Never Smoker   • Smokeless tobacco: Never Used  "      Allergies   Allergen Reactions   • Clarithromycin Other (See Comments)     Unknown;  BIAXIN         Current Outpatient Medications:   •  amLODIPine (NORVASC) 10 MG tablet, Take 1 tablet by mouth Daily., Disp: 90 tablet, Rfl: 3  •  atorvastatin (LIPITOR) 20 MG tablet, TAKE ONE TABLET BY MOUTH DAILY, Disp: 90 tablet, Rfl: 1  •  buPROPion XL (WELLBUTRIN XL) 300 MG 24 hr tablet, Take 1 tablet by mouth Daily., Disp: 90 tablet, Rfl: 3  •  doxazosin (CARDURA) 4 MG tablet, Take 1 tablet by mouth Every Night., Disp: 30 tablet, Rfl: 5  •  hydroCHLOROthiazide (HYDRODIURIL) 12.5 MG tablet, Take 1 tablet by mouth Daily., Disp: 30 tablet, Rfl: 5  •  potassium chloride (K-DUR) 10 MEQ CR tablet, Take 2 tablets by mouth 2 (Two) Times a Day. (Patient taking differently: Take 10 mEq by mouth 2 (Two) Times a Day.), Disp: 180 tablet, Rfl: 0  •  sertraline (ZOLOFT) 25 MG tablet, TAKE ONE TABLET BY MOUTH DAILY, Disp: 90 tablet, Rfl: 1    Review of Systems   Constitutional: Negative for chills, fatigue and fever.   HENT: Negative for congestion, ear pain and sinus pressure.    Eyes: Negative for blurred vision.   Respiratory: Negative for cough, chest tightness, shortness of breath and wheezing.    Cardiovascular: Negative for chest pain and palpitations.   Gastrointestinal: Negative for abdominal pain, blood in stool and constipation.   Musculoskeletal: Negative for neck pain.   Skin: Negative for color change.   Allergic/Immunologic: Negative for environmental allergies.   Neurological: Negative for dizziness, speech difficulty and headache.   Psychiatric/Behavioral: Negative for decreased concentration. The patient is not nervous/anxious.         Vital Signs  Vitals:    11/17/20 1040   BP: 156/90   BP Location: Left arm   Patient Position: Sitting   Cuff Size: Adult   Pulse: 88   Temp: 97.5 °F (36.4 °C)   Weight: 94.3 kg (208 lb)   Height: 184.2 cm (72.52\")   PainSc: 0-No pain       Physical Exam  Vitals signs reviewed. "   Constitutional:       Appearance: He is well-developed.   HENT:      Head: Normocephalic and atraumatic.   Cardiovascular:      Rate and Rhythm: Normal rate and regular rhythm.      Heart sounds: Normal heart sounds. No murmur.   Pulmonary:      Effort: Pulmonary effort is normal.      Breath sounds: Normal breath sounds.   Neurological:      Mental Status: He is alert and oriented to person, place, and time.          Procedures    ACE III MINI             Assessment/Plan:    Diagnoses and all orders for this visit:    1. Benign essential hypertension (Primary)    2. Stage 3b chronic kidney disease  -     Ambulatory Referral to Nephrology    Other orders  -     doxazosin (CARDURA) 4 MG tablet; Take 1 tablet by mouth Every Night.  Dispense: 30 tablet; Refill: 5  -     hydroCHLOROthiazide (HYDRODIURIL) 12.5 MG tablet; Take 1 tablet by mouth Daily.  Dispense: 30 tablet; Refill: 5    Plan    Doxazosin is increased to 4 mg a day.  He may forward results of his blood pressure to me in a few days.    He is referred for second nephrology opinion.      Plan of care reviewed with patient at the conclusion of today's visit. Education was provided regarding diagnosis, management, and any prescribed or recommended OTC medications.Patient verbalizes understanding of and agreement with management plan.         Josh Ortiz MD           Answers for HPI/ROS submitted by the patient on 11/10/2020   Hypertension  What is the primary reason for your visit?: High Blood Pressure  Chronicity: chronic  Onset: more than 1 year ago  Progression since onset: gradually worsening  Condition status: resistant  anxiety: No  headaches: No  malaise/fatigue: No  orthopnea: No  peripheral edema: No  PND: No  sweats: No  Agents associated with hypertension: no associated agents  CAD risks: dyslipidemia, family history  Compliance problems: no compliance problems

## 2020-11-23 RX ORDER — POTASSIUM CHLORIDE 750 MG/1
10 TABLET, FILM COATED, EXTENDED RELEASE ORAL 2 TIMES DAILY
Qty: 180 TABLET | Refills: 1 | Status: SHIPPED | OUTPATIENT
Start: 2020-11-23 | End: 2021-05-20

## 2021-01-18 RX ORDER — SERTRALINE HYDROCHLORIDE 25 MG/1
TABLET, FILM COATED ORAL
Qty: 90 TABLET | Refills: 3 | Status: SHIPPED | OUTPATIENT
Start: 2021-01-18 | End: 2022-01-12

## 2021-02-24 ENCOUNTER — TELEPHONE (OUTPATIENT)
Dept: INTERNAL MEDICINE | Facility: CLINIC | Age: 66
End: 2021-02-24

## 2021-02-24 DIAGNOSIS — G89.29 CHRONIC MIDLINE LOW BACK PAIN WITHOUT SCIATICA: Primary | ICD-10-CM

## 2021-02-24 DIAGNOSIS — M54.50 CHRONIC MIDLINE LOW BACK PAIN WITHOUT SCIATICA: Primary | ICD-10-CM

## 2021-03-15 RX ORDER — ATORVASTATIN CALCIUM 20 MG/1
TABLET, FILM COATED ORAL
Qty: 90 TABLET | Refills: 3 | Status: SHIPPED | OUTPATIENT
Start: 2021-03-15 | End: 2022-03-09

## 2021-03-31 DIAGNOSIS — M48.061 SPINAL STENOSIS OF LUMBAR REGION WITHOUT NEUROGENIC CLAUDICATION: Primary | ICD-10-CM

## 2021-04-14 ENCOUNTER — OFFICE VISIT (OUTPATIENT)
Dept: NEUROSURGERY | Facility: CLINIC | Age: 66
End: 2021-04-14

## 2021-04-14 VITALS — HEIGHT: 74 IN | TEMPERATURE: 97.1 F | BODY MASS INDEX: 27.9 KG/M2 | WEIGHT: 217.4 LBS

## 2021-04-14 DIAGNOSIS — M48.061 SPINAL STENOSIS OF LUMBAR REGION WITHOUT NEUROGENIC CLAUDICATION: ICD-10-CM

## 2021-04-14 DIAGNOSIS — M54.9 MECHANICAL BACK PAIN: Primary | ICD-10-CM

## 2021-04-14 DIAGNOSIS — M51.36 DDD (DEGENERATIVE DISC DISEASE), LUMBAR: ICD-10-CM

## 2021-04-14 PROCEDURE — 99203 OFFICE O/P NEW LOW 30 MIN: CPT | Performed by: NEUROLOGICAL SURGERY

## 2021-04-14 NOTE — PROGRESS NOTES
"Patient: Donta Ledesma Jr.  : 1955    Primary Care Provider: Josh Ortiz MD    Requesting Provider: As above        History    Chief Complaint: Chronic intermittent low back pain.    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  He is not sure what brings them on.  Typically he will have 2-3 spells a year.  Spells can last up to a couple of weeks.  He typically does some physical therapy or home exercises and his spells ivette.  When he gets the symptoms they involve his low back and extend into the back of his thighs.  He has no numbness or weakness.  His last bout ended about 10 days ago.  He does do a regular home exercise regimen.  He walks 4 miles a day.  During the season he is a snow skier as well.  At this juncture he is hoping for a \"solution\" so that he does not have more of these spells.  He certainly has no walking or standing intolerance.    Review of Systems   Constitutional: Negative for activity change, appetite change, chills, diaphoresis, fatigue, fever and unexpected weight change.   HENT: Negative for congestion, dental problem, drooling, ear discharge, ear pain, facial swelling, hearing loss, mouth sores, nosebleeds, postnasal drip, rhinorrhea, sinus pressure, sinus pain, sneezing, sore throat, tinnitus, trouble swallowing and voice change.    Eyes: Negative for photophobia, pain, discharge, redness, itching and visual disturbance.   Respiratory: Positive for apnea. Negative for cough, choking, chest tightness, shortness of breath, wheezing and stridor.    Cardiovascular: Negative for chest pain, palpitations and leg swelling.   Gastrointestinal: Negative for abdominal distention, abdominal pain, anal bleeding, blood in stool, constipation, diarrhea, nausea, rectal pain and vomiting.   Endocrine: Negative for cold intolerance, heat intolerance, polydipsia, polyphagia and polyuria. " "  Genitourinary: Negative for decreased urine volume, difficulty urinating, discharge, dysuria, enuresis, flank pain, frequency, genital sores, hematuria, penile pain, penile swelling, scrotal swelling, testicular pain and urgency.   Musculoskeletal: Positive for back pain. Negative for arthralgias, gait problem, joint swelling, myalgias, neck pain and neck stiffness.   Skin: Negative for color change, pallor, rash and wound.   Allergic/Immunologic: Negative for environmental allergies, food allergies and immunocompromised state.   Neurological: Negative for dizziness, tremors, seizures, syncope, facial asymmetry, speech difficulty, weakness, light-headedness, numbness and headaches.   Hematological: Negative for adenopathy. Does not bruise/bleed easily.   Psychiatric/Behavioral: Negative for agitation, behavioral problems, confusion, decreased concentration, dysphoric mood, hallucinations, self-injury, sleep disturbance and suicidal ideas. The patient is not nervous/anxious and is not hyperactive.        The patient's past medical history, past surgical history, family history, and social history have been reviewed at length in the electronic medical record.    Physical Exam:   Temp 97.1 °F (36.2 °C)   Ht 188 cm (74\")   Wt 98.6 kg (217 lb 6.4 oz)   BMI 27.91 kg/m²   CONSTITUTIONAL: Patient is well-nourished, pleasant and appears stated age.  CV: Heart regular rate and rhythm without murmur, rub, or gallop.  PULMONARY: Lungs are clear to ascultation.  MUSCULOSKELETAL:  Straight leg raising is negative.  Steve's Sign is negative.  ROM in the low back is normal.  Tenderness in the back to palpation is not observed.  NEUROLOGICAL:  Orientation, memory, attention span, language function, and cognition have been examined and are intact.  Strength is intact in the lower extremities to direct testing.  Muscle tone is normal throughout.  Station and gait are normal.  Sensation is intact to light touch testing " "throughout.  Deep tendon reflexes are 1+ and symmetrical at the knees and absent at the ankles.  Coordination is intact.      Medical Decision Making    Data Review:   MRI of the lumbar spine dated 3/19/2021 demonstrates a bit of a lumbarized first sacral segment.  There is generous grade stenosis from L2-3 down to L5-S1.  There is facet arthropathy.  There is diffuse degenerative disc disease as well.  There are some disc protrusion at L4-5 and L5-S1 as well.    Diagnosis:   1.  Mechanical low back pain.  2.  Lumbar degenerative disc disease.  3.  Lumbar degenerative joint disease.  4.  Lumbar stenosis without neurogenic claudication.    Treatment Options:   Unfortunately, I do not have the \"solution\" that he is seeking.  I have explained the natural history of his condition.  He is going to be at risk for recurrent episodes.  I have reassured him that some of his physical activities are a good thing and I do not believe he is doing any harm with his golf or his skiing.  I will be available if he develops progressive difficulties.  He is a nice gentleman.       Diagnosis Plan   1. Mechanical back pain     2. DDD (degenerative disc disease), lumbar     3. Spinal stenosis of lumbar region without neurogenic claudication         Scribed for Дмитрий Thomas MD by Sabine Saeed, [facility] 4/14/2021 11:02 EDT      I, Dr. Thomas, personally performed the services described in the documentation, as scribed in my presence, and it is both accurate and complete.  "

## 2021-05-20 RX ORDER — POTASSIUM CHLORIDE 750 MG/1
TABLET, FILM COATED, EXTENDED RELEASE ORAL
Qty: 180 TABLET | Refills: 1 | Status: SHIPPED | OUTPATIENT
Start: 2021-05-20 | End: 2021-11-16

## 2021-05-26 RX ORDER — HYDROCHLOROTHIAZIDE 12.5 MG/1
TABLET ORAL
Qty: 30 TABLET | Refills: 11 | Status: SHIPPED | OUTPATIENT
Start: 2021-05-26 | End: 2022-05-24

## 2021-06-01 ENCOUNTER — OFFICE VISIT (OUTPATIENT)
Dept: INTERNAL MEDICINE | Facility: CLINIC | Age: 66
End: 2021-06-01

## 2021-06-01 ENCOUNTER — LAB (OUTPATIENT)
Dept: LAB | Facility: HOSPITAL | Age: 66
End: 2021-06-01

## 2021-06-01 VITALS
DIASTOLIC BLOOD PRESSURE: 86 MMHG | TEMPERATURE: 97.7 F | SYSTOLIC BLOOD PRESSURE: 150 MMHG | HEIGHT: 74 IN | OXYGEN SATURATION: 96 % | BODY MASS INDEX: 27.11 KG/M2 | WEIGHT: 211.2 LBS | HEART RATE: 83 BPM

## 2021-06-01 DIAGNOSIS — E78.00 HYPERCHOLESTEROLEMIA: ICD-10-CM

## 2021-06-01 DIAGNOSIS — R73.03 PREDIABETES: ICD-10-CM

## 2021-06-01 DIAGNOSIS — Z11.59 ENCOUNTER FOR HEPATITIS C SCREENING TEST FOR LOW RISK PATIENT: ICD-10-CM

## 2021-06-01 DIAGNOSIS — F32.5 MAJOR DEPRESSIVE DISORDER WITH SINGLE EPISODE, IN FULL REMISSION (HCC): ICD-10-CM

## 2021-06-01 DIAGNOSIS — K58.0 IRRITABLE BOWEL SYNDROME WITH DIARRHEA: ICD-10-CM

## 2021-06-01 DIAGNOSIS — N18.32 STAGE 3B CHRONIC KIDNEY DISEASE (HCC): ICD-10-CM

## 2021-06-01 DIAGNOSIS — G47.33 OBSTRUCTIVE SLEEP APNEA SYNDROME: ICD-10-CM

## 2021-06-01 DIAGNOSIS — I10 BENIGN ESSENTIAL HYPERTENSION: ICD-10-CM

## 2021-06-01 DIAGNOSIS — Z00.00 ANNUAL PHYSICAL EXAM: Primary | ICD-10-CM

## 2021-06-01 DIAGNOSIS — M50.90 DISC DISORDER OF CERVICAL REGION: ICD-10-CM

## 2021-06-01 DIAGNOSIS — Z12.11 COLON CANCER SCREENING: ICD-10-CM

## 2021-06-01 LAB
ALBUMIN SERPL-MCNC: 4.4 G/DL (ref 3.5–5.2)
ALBUMIN UR-MCNC: <1.2 MG/DL
ALBUMIN/GLOB SERPL: 1.4 G/DL
ALP SERPL-CCNC: 129 U/L (ref 39–117)
ALT SERPL W P-5'-P-CCNC: 25 U/L (ref 1–41)
ANION GAP SERPL CALCULATED.3IONS-SCNC: 10.3 MMOL/L (ref 5–15)
AST SERPL-CCNC: 26 U/L (ref 1–40)
BASOPHILS # BLD AUTO: 0.08 10*3/MM3 (ref 0–0.2)
BASOPHILS NFR BLD AUTO: 1.6 % (ref 0–1.5)
BILIRUB SERPL-MCNC: 0.6 MG/DL (ref 0–1.2)
BUN SERPL-MCNC: 22 MG/DL (ref 8–23)
BUN/CREAT SERPL: 14 (ref 7–25)
CALCIUM SPEC-SCNC: 9 MG/DL (ref 8.6–10.5)
CHLORIDE SERPL-SCNC: 102 MMOL/L (ref 98–107)
CHOLEST SERPL-MCNC: 174 MG/DL (ref 0–200)
CO2 SERPL-SCNC: 25.7 MMOL/L (ref 22–29)
CREAT SERPL-MCNC: 1.57 MG/DL (ref 0.76–1.27)
CREAT UR-MCNC: 127.1 MG/DL
DEPRECATED RDW RBC AUTO: 44.1 FL (ref 37–54)
EOSINOPHIL # BLD AUTO: 0.13 10*3/MM3 (ref 0–0.4)
EOSINOPHIL NFR BLD AUTO: 2.6 % (ref 0.3–6.2)
ERYTHROCYTE [DISTWIDTH] IN BLOOD BY AUTOMATED COUNT: 12.9 % (ref 12.3–15.4)
GFR SERPL CREATININE-BSD FRML MDRD: 44 ML/MIN/1.73
GLOBULIN UR ELPH-MCNC: 3.1 GM/DL
GLUCOSE SERPL-MCNC: 104 MG/DL (ref 65–99)
HBA1C MFR BLD: 5.51 % (ref 4.8–5.6)
HCT VFR BLD AUTO: 46.8 % (ref 37.5–51)
HCV AB SER DONR QL: NORMAL
HDLC SERPL-MCNC: 41 MG/DL (ref 40–60)
HGB BLD-MCNC: 15.6 G/DL (ref 13–17.7)
IMM GRANULOCYTES # BLD AUTO: 0.01 10*3/MM3 (ref 0–0.05)
IMM GRANULOCYTES NFR BLD AUTO: 0.2 % (ref 0–0.5)
LDLC SERPL CALC-MCNC: 111 MG/DL (ref 0–100)
LDLC/HDLC SERPL: 2.64 {RATIO}
LYMPHOCYTES # BLD AUTO: 1.25 10*3/MM3 (ref 0.7–3.1)
LYMPHOCYTES NFR BLD AUTO: 24.6 % (ref 19.6–45.3)
MCH RBC QN AUTO: 31 PG (ref 26.6–33)
MCHC RBC AUTO-ENTMCNC: 33.3 G/DL (ref 31.5–35.7)
MCV RBC AUTO: 92.9 FL (ref 79–97)
MICROALBUMIN/CREAT UR: NORMAL MG/G{CREAT}
MONOCYTES # BLD AUTO: 0.49 10*3/MM3 (ref 0.1–0.9)
MONOCYTES NFR BLD AUTO: 9.6 % (ref 5–12)
NEUTROPHILS NFR BLD AUTO: 3.12 10*3/MM3 (ref 1.7–7)
NEUTROPHILS NFR BLD AUTO: 61.4 % (ref 42.7–76)
NRBC BLD AUTO-RTO: 0 /100 WBC (ref 0–0.2)
PLATELET # BLD AUTO: 222 10*3/MM3 (ref 140–450)
PMV BLD AUTO: 9.9 FL (ref 6–12)
POTASSIUM SERPL-SCNC: 3.8 MMOL/L (ref 3.5–5.2)
PROT SERPL-MCNC: 7.5 G/DL (ref 6–8.5)
RBC # BLD AUTO: 5.04 10*6/MM3 (ref 4.14–5.8)
SODIUM SERPL-SCNC: 138 MMOL/L (ref 136–145)
TRIGL SERPL-MCNC: 124 MG/DL (ref 0–150)
VLDLC SERPL-MCNC: 22 MG/DL (ref 5–40)
WBC # BLD AUTO: 5.08 10*3/MM3 (ref 3.4–10.8)

## 2021-06-01 PROCEDURE — 90732 PPSV23 VACC 2 YRS+ SUBQ/IM: CPT | Performed by: INTERNAL MEDICINE

## 2021-06-01 PROCEDURE — G0009 ADMIN PNEUMOCOCCAL VACCINE: HCPCS | Performed by: INTERNAL MEDICINE

## 2021-06-01 PROCEDURE — 82570 ASSAY OF URINE CREATININE: CPT

## 2021-06-01 PROCEDURE — 36415 COLL VENOUS BLD VENIPUNCTURE: CPT

## 2021-06-01 PROCEDURE — 86803 HEPATITIS C AB TEST: CPT

## 2021-06-01 PROCEDURE — 80061 LIPID PANEL: CPT

## 2021-06-01 PROCEDURE — 80053 COMPREHEN METABOLIC PANEL: CPT

## 2021-06-01 PROCEDURE — G0439 PPPS, SUBSEQ VISIT: HCPCS | Performed by: INTERNAL MEDICINE

## 2021-06-01 PROCEDURE — 85025 COMPLETE CBC W/AUTO DIFF WBC: CPT

## 2021-06-01 PROCEDURE — 82043 UR ALBUMIN QUANTITATIVE: CPT

## 2021-06-01 PROCEDURE — 83036 HEMOGLOBIN GLYCOSYLATED A1C: CPT

## 2021-06-01 RX ORDER — DOXAZOSIN MESYLATE 4 MG/1
4 TABLET ORAL NIGHTLY
Qty: 90 TABLET | Refills: 3 | Status: SHIPPED | OUTPATIENT
Start: 2021-06-01 | End: 2022-06-30 | Stop reason: SDUPTHER

## 2021-06-01 RX ORDER — BUPROPION HYDROCHLORIDE 300 MG/1
300 TABLET ORAL DAILY
Qty: 90 TABLET | Refills: 3 | Status: SHIPPED | OUTPATIENT
Start: 2021-06-01 | End: 2022-06-30 | Stop reason: SDUPTHER

## 2021-06-01 RX ORDER — AMLODIPINE BESYLATE 10 MG/1
10 TABLET ORAL DAILY
Qty: 90 TABLET | Refills: 3 | Status: SHIPPED | OUTPATIENT
Start: 2021-06-01 | End: 2022-06-30 | Stop reason: SDUPTHER

## 2021-06-01 NOTE — PROGRESS NOTES
QUICK REFERENCE INFORMATION:  The ABCs of the Annual Wellness Visit    Subsequent Medicare Wellness Visit    HEALTH RISK ASSESSMENT    1955    Recent Hospitalizations:  No hospitalization(s) within the last year..        Current Medical Providers:  Patient Care Team:  Josh Ortiz MD as PCP - General (Internal Medicine)  Shankar Woodward MD as Consulting Physician (Urology)  Ryan Lopez MD as Consulting Physician (Gastroenterology)  Darryl Hayes MD as Consulting Physician (Ophthalmology)  Josh Ortiz MD as Referring Physician (Internal Medicine)        Smoking Status:  Social History     Tobacco Use   Smoking Status Never Smoker   Smokeless Tobacco Never Used       Alcohol Consumption:  Social History     Substance and Sexual Activity   Alcohol Use Never       Depression Screen:   PHQ-2/PHQ-9 Depression Screening 6/1/2021   Little interest or pleasure in doing things 0   Feeling down, depressed, or hopeless 0   Total Score 0       Health Habits and Functional and Cognitive Screening:  Functional & Cognitive Status 6/1/2021   Do you have difficulty preparing food and eating? No   Do you have difficulty bathing yourself, getting dressed or grooming yourself? No   Do you have difficulty using the toilet? No   Do you have difficulty moving around from place to place? No   Do you have trouble with steps or getting out of a bed or a chair? -   Current Diet Well Balanced Diet   Dental Exam Up to date   Eye Exam Up to date   Exercise (times per week) 7 times per week   Current Exercise Activities Include Walking   Do you need help using the phone?  No   Are you deaf or do you have serious difficulty hearing?  No   Do you need help with transportation? No   Do you need help shopping? No   Do you need help preparing meals?  No   Do you need help with housework?  No   Do you need help with laundry? No   Do you need help taking your medications? No   Do you need help managing  money? No   Do you ever drive or ride in a car without wearing a seat belt? No   Have you felt unusual stress, anger or loneliness in the last month? No   Who do you live with? Spouse   If you need help, do you have trouble finding someone available to you? No   Have you been bothered in the last four weeks by sexual problems? No   Do you have difficulty concentrating, remembering or making decisions? No       Fall Risk Screen:  BLAKE Fall Risk Assessment was completed, and patient is at LOW risk for falls.Assessment completed on:6/1/2021    ACE III MINI        Does the patient have evidence of cognitive impairment? No    Aspirin use counseling: Does not need ASA (and currently is not on it)    Recent Lab Results:  CMP:  Lab Results   Component Value Date    BUN 31 (H) 05/29/2020    CREATININE 1.98 (H) 05/29/2020    EGFRIFNONA 34 (L) 05/29/2020    BCR 15.7 05/29/2020     05/29/2020    K 3.8 05/29/2020    CO2 26.1 05/29/2020    CALCIUM 9.2 05/29/2020    ALBUMIN 4.50 05/14/2020    BILITOT 0.7 05/14/2020    ALKPHOS 108 05/14/2020    AST 24 05/14/2020    ALT 33 05/14/2020     HbA1c:  Lab Results   Component Value Date    HGBA1C 5.90 (H) 05/14/2020    HGBA1C 5.68 (H) 05/13/2019     Microalbumin:  Lab Results   Component Value Date    MICROALBUR 1.5 05/14/2020     Lipid Panel  Lab Results   Component Value Date    CHOL 166 05/14/2020    TRIG 167 (H) 05/14/2020    HDL 40 05/14/2020    LDL 93 05/14/2020    AST 24 05/14/2020    ALT 33 05/14/2020       Visual Acuity:  No exam data present    Age-appropriate Screening Schedule:  Refer to the list below for future screening recommendations based on patient's age, sex and/or medical conditions. Orders for these recommended tests are listed in the plan section. The patient has been provided with a written plan.    Health Maintenance   Topic Date Due   • ZOSTER VACCINE (2 of 3) 11/24/2016   • TDAP/TD VACCINES (2 - Td or Tdap) 02/12/2019   • LIPID PANEL  05/14/2021   •  INFLUENZA VACCINE  08/01/2021        Subjective   History of Present Illness    Donta Ledesma Jr. is a 65 y.o. male who presents for a Subsequent Wellness Visit.    CHRONIC CONDITIONS    The following portions of the patient's history were reviewed and updated as appropriate: allergies, current medications, past family history, past medical history, past social history, past surgical history and problem list.    Outpatient Medications Prior to Visit   Medication Sig Dispense Refill   • atorvastatin (LIPITOR) 20 MG tablet TAKE ONE TABLET BY MOUTH DAILY 90 tablet 3   • hydroCHLOROthiazide (HYDRODIURIL) 12.5 MG tablet TAKE ONE TABLET BY MOUTH DAILY 30 tablet 11   • potassium chloride 10 MEQ CR tablet TAKE ONE TABLET BY MOUTH TWICE A  tablet 1   • sertraline (ZOLOFT) 25 MG tablet TAKE ONE TABLET BY MOUTH DAILY 90 tablet 3   • amLODIPine (NORVASC) 10 MG tablet Take 1 tablet by mouth Daily. 90 tablet 3   • buPROPion XL (WELLBUTRIN XL) 300 MG 24 hr tablet Take 1 tablet by mouth Daily. 90 tablet 3   • doxazosin (CARDURA) 4 MG tablet Take 1 tablet by mouth Every Night. 30 tablet 5     No facility-administered medications prior to visit.       Patient Active Problem List   Diagnosis   • Irritable bowel syndrome   • Depression   • Annual physical exam   • Hypercholesterolemia   • Benign essential hypertension   • Chronic kidney disease, stage III (moderate) (CMS/HCC)   • Sleep apnea   • Disc disorder of cervical region   • Prediabetes   • Major depressive disorder with single episode, in full remission (CMS/HCC)       Advance Care Planning:  ACP discussion was held with the patient during this visit. Patient has an advance directive (not in EMR), copy requested.    Identification of Risk Factors:  Risk factors include: Advance Directive Discussion.    Review of Systems   Constitutional: Negative for chills, fatigue and fever.   HENT: Negative for congestion, ear pain and sinus pressure.    Respiratory: Negative for  cough, chest tightness, shortness of breath and wheezing.    Cardiovascular: Negative for chest pain and palpitations.   Gastrointestinal: Negative for abdominal pain, blood in stool and constipation.   Musculoskeletal: Positive for back pain.   Skin: Negative for color change.   Allergic/Immunologic: Negative for environmental allergies.   Neurological: Negative for dizziness, speech difficulty and headaches.   Psychiatric/Behavioral: Negative for confusion. The patient is not nervous/anxious.        Compared to one year ago, the patient feels his physical health is the same.  Compared to one year ago, the patient feels his mental health is the same.    Objective     Physical Exam  Vitals and nursing note reviewed.   Constitutional:       Appearance: He is well-developed.   HENT:      Head: Normocephalic and atraumatic.      Right Ear: External ear normal.      Left Ear: External ear normal.      Nose: Nose normal.      Mouth/Throat:      Pharynx: No oropharyngeal exudate.   Eyes:      Conjunctiva/sclera: Conjunctivae normal.      Pupils: Pupils are equal, round, and reactive to light.   Neck:      Thyroid: No thyromegaly.      Vascular: No JVD.   Cardiovascular:      Rate and Rhythm: Normal rate and regular rhythm.      Heart sounds: Normal heart sounds. No murmur heard.   No friction rub. No gallop.    Pulmonary:      Effort: Pulmonary effort is normal. No respiratory distress.      Breath sounds: Normal breath sounds. No wheezing or rales.   Chest:      Chest wall: No tenderness.   Abdominal:      General: Bowel sounds are normal. There is no distension.      Palpations: Abdomen is soft. There is no mass.      Tenderness: There is no abdominal tenderness. There is no guarding or rebound.      Hernia: No hernia is present.   Musculoskeletal:         General: No tenderness. Normal range of motion.      Cervical back: Normal range of motion and neck supple.   Lymphadenopathy:      Cervical: No cervical adenopathy.  "  Skin:     General: Skin is warm and dry.      Findings: No erythema or rash.      Comments: Scattered benign freckling, no suspicious lesions.   Neurological:      Mental Status: He is alert and oriented to person, place, and time.      Cranial Nerves: No cranial nerve deficit.      Sensory: No sensory deficit.      Motor: No abnormal muscle tone.      Coordination: Coordination normal.      Deep Tendon Reflexes: Reflexes normal.      Comments: He has slightly reduced sensation to monofilament on plantar aspect of both feet.   Psychiatric:         Behavior: Behavior normal.         Thought Content: Thought content normal.         Judgment: Judgment normal.          Procedures     Vitals:    06/01/21 0832   BP: 150/86   BP Location: Left arm   Patient Position: Sitting   Cuff Size: Adult   Pulse: 83   Temp: 97.7 °F (36.5 °C)   TempSrc: Temporal   SpO2: 96%   Weight: 95.8 kg (211 lb 3.2 oz)   Height: 188 cm (74.02\")   PainSc: 0-No pain       Patient's Body mass index is 27.1 kg/m². indicating that he is overweight (BMI 25-29.9). Obesity-related health conditions include the following: obstructive sleep apnea, hypertension and osteoarthritis. Obesity is improving with lifestyle modifications. BMI is is above average; BMI management plan is completed. We discussed portion control, increasing exercise and joining a fitness center or start home based exercise program..      Assessment/Plan   Problem List Items Addressed This Visit        Cardiac and Vasculature    Hypercholesterolemia    Relevant Medications    atorvastatin (LIPITOR) 20 MG tablet    Other Relevant Orders    Lipid Panel    Benign essential hypertension    Relevant Medications    hydroCHLOROthiazide (HYDRODIURIL) 12.5 MG tablet    amLODIPine (NORVASC) 10 MG tablet    doxazosin (CARDURA) 4 MG tablet    Other Relevant Orders    CBC & Differential    Comprehensive Metabolic Panel    Microalbumin / Creatinine Urine Ratio - Urine, Clean Catch       Endocrine " and Metabolic    Prediabetes    Relevant Orders    Hemoglobin A1c       Gastrointestinal Abdominal     Irritable bowel syndrome       Genitourinary and Reproductive     Chronic kidney disease, stage III (moderate) (CMS/HCC)    Relevant Medications    hydroCHLOROthiazide (HYDRODIURIL) 12.5 MG tablet       Health Encounters    Annual physical exam - Primary       Mental Health    Major depressive disorder with single episode, in full remission (CMS/HCC)    Relevant Medications    sertraline (ZOLOFT) 25 MG tablet    buPROPion XL (WELLBUTRIN XL) 300 MG 24 hr tablet       Neuro    Disc disorder of cervical region    Overview     Other and unspecified            Sleep    Sleep apnea      Other Visit Diagnoses     Colon cancer screening        Relevant Orders    Ambulatory Referral to Gastroenterology    Encounter for hepatitis C screening test for low risk patient        Relevant Orders    Hepatitis C antibody        Patient Self-Management and Personalized Health Advice  The patient has been provided with information about: diet, exercise, weight management and prevention of cardiac or vascular disease and preventive services including:   · Annual Wellness Visit (AWV).    Outpatient Encounter Medications as of 6/1/2021   Medication Sig Dispense Refill   • amLODIPine (NORVASC) 10 MG tablet Take 1 tablet by mouth Daily. 90 tablet 3   • atorvastatin (LIPITOR) 20 MG tablet TAKE ONE TABLET BY MOUTH DAILY 90 tablet 3   • buPROPion XL (WELLBUTRIN XL) 300 MG 24 hr tablet Take 1 tablet by mouth Daily. 90 tablet 3   • doxazosin (CARDURA) 4 MG tablet Take 1 tablet by mouth Every Night. 90 tablet 3   • hydroCHLOROthiazide (HYDRODIURIL) 12.5 MG tablet TAKE ONE TABLET BY MOUTH DAILY 30 tablet 11   • potassium chloride 10 MEQ CR tablet TAKE ONE TABLET BY MOUTH TWICE A  tablet 1   • sertraline (ZOLOFT) 25 MG tablet TAKE ONE TABLET BY MOUTH DAILY 90 tablet 3   • [DISCONTINUED] amLODIPine (NORVASC) 10 MG tablet Take 1 tablet by  mouth Daily. 90 tablet 3   • [DISCONTINUED] buPROPion XL (WELLBUTRIN XL) 300 MG 24 hr tablet Take 1 tablet by mouth Daily. 90 tablet 3   • [DISCONTINUED] doxazosin (CARDURA) 4 MG tablet Take 1 tablet by mouth Every Night. 30 tablet 5     No facility-administered encounter medications on file as of 6/1/2021.     Plan    Overall he remains in very good health with good lifestyle habits.  He is due for colonoscopy, this is scheduled.  He is fully vaccinated against Covid 19.  He sees urology annually.    Blood pressure is well controlled as documented by home readings.  He will continue hydrochlorothiazide, doxazosin, amlodipine.    Lipid panel is pending, the treatment remains healthy diet and atorvastatin.    A1c is pending, the treatment remains healthy diet and avoidance of weight gain.    GFR is pending, the treatment remains control of blood pressure and avoidance of NSAIDs.  He sees nephrology twice a year.    Mood is well compensated on sertraline and bupropion.    He uses CPAP PAP regularly and will follow up with sleep medicine.    Lumbar spinal stenosis is reasonably stable.  He typically has about 3 flareups a year.  He does have some paresthesias in his feet which are probably related to radicular changes.  He will see physical therapy for chiropractic for flares.    Reviewed use of high risk medication in the elderly: yes  Reviewed for potential of harmful drug interactions in the elderly: yes    Follow Up:  Return in about 6 months (around 12/1/2021) for follow up.     There are no Patient Instructions on file for this visit.    An After Visit Summary and PPPS with all of these plans were given to the patient.

## 2021-06-14 DIAGNOSIS — Z12.11 SCREENING FOR COLON CANCER: Primary | ICD-10-CM

## 2021-07-01 ENCOUNTER — OUTSIDE FACILITY SERVICE (OUTPATIENT)
Dept: GASTROENTEROLOGY | Facility: CLINIC | Age: 66
End: 2021-07-01

## 2021-07-01 PROCEDURE — 88305 TISSUE EXAM BY PATHOLOGIST: CPT | Performed by: INTERNAL MEDICINE

## 2021-07-01 PROCEDURE — G0500 MOD SEDAT ENDO SERVICE >5YRS: HCPCS | Performed by: INTERNAL MEDICINE

## 2021-07-01 PROCEDURE — 45385 COLONOSCOPY W/LESION REMOVAL: CPT | Performed by: INTERNAL MEDICINE

## 2021-07-02 ENCOUNTER — LAB REQUISITION (OUTPATIENT)
Dept: LAB | Facility: HOSPITAL | Age: 66
End: 2021-07-02

## 2021-07-02 DIAGNOSIS — Z86.010 PERSONAL HISTORY OF COLONIC POLYPS: ICD-10-CM

## 2021-07-02 DIAGNOSIS — Z12.11 ENCOUNTER FOR SCREENING FOR MALIGNANT NEOPLASM OF COLON: ICD-10-CM

## 2021-07-02 DIAGNOSIS — K57.30 DIVERTICULOSIS OF LARGE INTESTINE WITHOUT PERFORATION OR ABSCESS WITHOUT BLEEDING: ICD-10-CM

## 2021-07-02 DIAGNOSIS — D12.2 BENIGN NEOPLASM OF ASCENDING COLON: ICD-10-CM

## 2021-07-06 LAB
CYTO UR: NORMAL
LAB AP CASE REPORT: NORMAL
LAB AP CLINICAL INFORMATION: NORMAL
LAB AP DIAGNOSIS COMMENT: NORMAL
PATH REPORT.FINAL DX SPEC: NORMAL
PATH REPORT.GROSS SPEC: NORMAL

## 2021-07-19 ENCOUNTER — OFFICE VISIT (OUTPATIENT)
Dept: INTERNAL MEDICINE | Facility: CLINIC | Age: 66
End: 2021-07-19

## 2021-07-19 VITALS
BODY MASS INDEX: 27.11 KG/M2 | HEIGHT: 74 IN | TEMPERATURE: 98.4 F | HEART RATE: 88 BPM | DIASTOLIC BLOOD PRESSURE: 74 MMHG | SYSTOLIC BLOOD PRESSURE: 128 MMHG | WEIGHT: 211.2 LBS

## 2021-07-19 DIAGNOSIS — H10.021 OTHER MUCOPURULENT CONJUNCTIVITIS OF RIGHT EYE: Primary | ICD-10-CM

## 2021-07-19 PROCEDURE — 99213 OFFICE O/P EST LOW 20 MIN: CPT | Performed by: INTERNAL MEDICINE

## 2021-07-19 RX ORDER — NEOMYCIN/POLYMYXIN B/HYDROCORT 3.5-10K-1
1 SUSPENSION, DROPS(FINAL DOSAGE FORM)(ML) OPHTHALMIC (EYE)
Qty: 7.5 ML | Refills: 0 | Status: SHIPPED | OUTPATIENT
Start: 2021-07-19 | End: 2021-12-01

## 2021-07-19 NOTE — PROGRESS NOTES
[facility]     Donta Ledesma Jr.  1955   [id number]      Patient Care Team:  Josh Ortiz MD as PCP - General (Internal Medicine)  Shankar Woodward MD as Consulting Physician (Urology)  Ryan Lopez MD as Consulting Physician (Gastroenterology)  Darryl Hayes MD as Consulting Physician (Ophthalmology)  Josh Ortiz MD as Referring Physician (Internal Medicine)    Chief Complaint::   Chief Complaint   Patient presents with   • Eye Pain     right - grandchild has pink eye        HPI  Mr. Ledesma comes in complaining of an irritated right eye.  His grandchild has pinkeye and he now has the same symptoms.  His eye is runny and irritated.  He has no loss of vision.  There is no pain.    Chronic Conditions:      Patient Active Problem List   Diagnosis   • Irritable bowel syndrome   • Depression   • Annual physical exam   • Hypercholesterolemia   • Benign essential hypertension   • Chronic kidney disease, stage III (moderate) (CMS/HCC)   • Sleep apnea   • Disc disorder of cervical region   • Prediabetes   • Major depressive disorder with single episode, in full remission (CMS/HCC)        Past Medical History:   Diagnosis Date   • Back problem    • Benign prostate hyperplasia    • Glaucoma    • Hypertension    • Nephrolithiasis        Past Surgical History:   Procedure Laterality Date   • WISDOM TOOTH EXTRACTION         Family History   Problem Relation Age of Onset   • Hypertension Mother    • Cancer Mother    • Diabetes Father    • Cancer Father    • Hypertension Brother    • Cancer Maternal Grandfather    • Coronary artery disease Paternal Grandmother    • Stroke Paternal Grandfather        Social History     Socioeconomic History   • Marital status:      Spouse name: Not on file   • Number of children: Not on file   • Years of education: Not on file   • Highest education level: Not on file   Tobacco Use   • Smoking status: Never Smoker   • Smokeless  "tobacco: Never Used   Vaping Use   • Vaping Use: Never used   Substance and Sexual Activity   • Alcohol use: Never   • Drug use: Never   • Sexual activity: Defer       Allergies   Allergen Reactions   • Clarithromycin Other (See Comments)     Unknown;  BIAXIN         Current Outpatient Medications:   •  amLODIPine (NORVASC) 10 MG tablet, Take 1 tablet by mouth Daily., Disp: 90 tablet, Rfl: 3  •  atorvastatin (LIPITOR) 20 MG tablet, TAKE ONE TABLET BY MOUTH DAILY, Disp: 90 tablet, Rfl: 3  •  buPROPion XL (WELLBUTRIN XL) 300 MG 24 hr tablet, Take 1 tablet by mouth Daily., Disp: 90 tablet, Rfl: 3  •  doxazosin (CARDURA) 4 MG tablet, Take 1 tablet by mouth Every Night., Disp: 90 tablet, Rfl: 3  •  hydroCHLOROthiazide (HYDRODIURIL) 12.5 MG tablet, TAKE ONE TABLET BY MOUTH DAILY, Disp: 30 tablet, Rfl: 11  •  potassium chloride 10 MEQ CR tablet, TAKE ONE TABLET BY MOUTH TWICE A DAY, Disp: 180 tablet, Rfl: 1  •  sertraline (ZOLOFT) 25 MG tablet, TAKE ONE TABLET BY MOUTH DAILY, Disp: 90 tablet, Rfl: 3  •  neomycin-polymyxin-hydrocortisone (CORTISPORIN) 3.5-00405-7 ophthalmic suspension, Administer 1 drop to the right eye Every 4 (Four) Hours While Awake., Disp: 7.5 mL, Rfl: 0    Review of Systems   Constitutional: Negative.    Respiratory: Negative.  Negative for chest tightness and shortness of breath.    Cardiovascular: Negative.  Negative for chest pain.   Gastrointestinal: Negative for abdominal pain, blood in stool, constipation and diarrhea.        Vital Signs  Vitals:    07/19/21 1115   BP: 128/74   BP Location: Right arm   Patient Position: Sitting   Cuff Size: Adult   Pulse: 88   Temp: 98.4 °F (36.9 °C)   Weight: 95.8 kg (211 lb 3.2 oz)   Height: 188 cm (74.02\")   PainSc:   2   PainLoc: Eye       Physical Exam  Vitals reviewed.   Constitutional:       Appearance: He is well-developed.   HENT:      Head: Normocephalic and atraumatic.   Eyes:      Conjunctiva/sclera:      Right eye: Right conjunctiva is injected. " Exudate present.   Cardiovascular:      Rate and Rhythm: Normal rate and regular rhythm.      Heart sounds: Normal heart sounds. No murmur heard.     Pulmonary:      Effort: Pulmonary effort is normal.      Breath sounds: Normal breath sounds.   Neurological:      Mental Status: He is alert and oriented to person, place, and time.          Procedures    ACE III MINI             Assessment/Plan:    Diagnoses and all orders for this visit:    1. Other mucopurulent conjunctivitis of right eye (Primary)    Other orders  -     neomycin-polymyxin-hydrocortisone (CORTISPORIN) 3.5-71530-1 ophthalmic suspension; Administer 1 drop to the right eye Every 4 (Four) Hours While Awake.  Dispense: 7.5 mL; Refill: 0    Plan    Cortisporin ophthalmic drops as noted above.      Plan of care reviewed with patient at the conclusion of today's visit. Education was provided regarding diagnosis, management, and any prescribed or recommended OTC medications.Patient verbalizes understanding of and agreement with management plan.         Josh Ortiz MD

## 2021-09-22 ENCOUNTER — TELEPHONE (OUTPATIENT)
Dept: INTERNAL MEDICINE | Facility: CLINIC | Age: 66
End: 2021-09-22

## 2021-09-22 NOTE — TELEPHONE ENCOUNTER
Rec'd fax from [facility] for Cpap supplies. This has previously been handled by Dr. Donato. Called pt and he said since Dr. Donato retired he has not secured a new physician. He has a list of names he wanted to run by Dr. Ortiz. Advised to send nmes on My Chart for Dr. Ortiz's review (next appt is not until Dec). He said he has already rec'd the supplies.  Called [facility] and they just need order signed to bill insurance

## 2021-11-16 RX ORDER — POTASSIUM CHLORIDE 750 MG/1
TABLET, FILM COATED, EXTENDED RELEASE ORAL
Qty: 180 TABLET | Refills: 1 | Status: SHIPPED | OUTPATIENT
Start: 2021-11-16 | End: 2022-05-18

## 2021-11-16 NOTE — TELEPHONE ENCOUNTER
Rx Refill Note  Requested Prescriptions     Pending Prescriptions Disp Refills   • potassium chloride 10 MEQ CR tablet [Pharmacy Med Name: POTASSIUM CHLORIDE ER 10MEQ TABLET] 180 tablet 1     Sig: TAKE ONE TABLET BY MOUTH TWICE A DAY      Last office visit with prescribing clinician: 7/19/2021      Next office visit with prescribing clinician: 12/1/2021            Cristine Hayes LPN  11/16/21, 08:49 EST

## 2021-11-18 ENCOUNTER — FLU SHOT (OUTPATIENT)
Dept: INTERNAL MEDICINE | Facility: CLINIC | Age: 66
End: 2021-11-18

## 2021-11-18 DIAGNOSIS — Z23 NEED FOR INFLUENZA VACCINATION: Primary | ICD-10-CM

## 2021-11-18 PROCEDURE — G0008 ADMIN INFLUENZA VIRUS VAC: HCPCS | Performed by: INTERNAL MEDICINE

## 2021-11-18 PROCEDURE — 90662 IIV NO PRSV INCREASED AG IM: CPT | Performed by: INTERNAL MEDICINE

## 2021-12-01 ENCOUNTER — OFFICE VISIT (OUTPATIENT)
Dept: INTERNAL MEDICINE | Facility: CLINIC | Age: 66
End: 2021-12-01

## 2021-12-01 ENCOUNTER — LAB (OUTPATIENT)
Dept: LAB | Facility: HOSPITAL | Age: 66
End: 2021-12-01

## 2021-12-01 VITALS
TEMPERATURE: 98 F | DIASTOLIC BLOOD PRESSURE: 70 MMHG | HEART RATE: 80 BPM | HEIGHT: 74 IN | SYSTOLIC BLOOD PRESSURE: 132 MMHG | BODY MASS INDEX: 27.28 KG/M2 | WEIGHT: 212.6 LBS

## 2021-12-01 DIAGNOSIS — E78.00 HYPERCHOLESTEROLEMIA: ICD-10-CM

## 2021-12-01 DIAGNOSIS — R73.03 PREDIABETES: ICD-10-CM

## 2021-12-01 DIAGNOSIS — N18.32 STAGE 3B CHRONIC KIDNEY DISEASE (HCC): ICD-10-CM

## 2021-12-01 DIAGNOSIS — I10 BENIGN ESSENTIAL HYPERTENSION: Primary | ICD-10-CM

## 2021-12-01 DIAGNOSIS — F32.5 MAJOR DEPRESSIVE DISORDER WITH SINGLE EPISODE, IN FULL REMISSION (HCC): ICD-10-CM

## 2021-12-01 LAB
CHOLEST SERPL-MCNC: 181 MG/DL (ref 0–200)
HDLC SERPL-MCNC: 43 MG/DL (ref 40–60)
LDLC SERPL CALC-MCNC: 119 MG/DL (ref 0–100)
LDLC/HDLC SERPL: 2.73 {RATIO}
TRIGL SERPL-MCNC: 103 MG/DL (ref 0–150)
VLDLC SERPL-MCNC: 19 MG/DL (ref 5–40)

## 2021-12-01 PROCEDURE — 80061 LIPID PANEL: CPT

## 2021-12-01 PROCEDURE — 99214 OFFICE O/P EST MOD 30 MIN: CPT | Performed by: INTERNAL MEDICINE

## 2021-12-01 PROCEDURE — 83036 HEMOGLOBIN GLYCOSYLATED A1C: CPT

## 2021-12-01 PROCEDURE — 80053 COMPREHEN METABOLIC PANEL: CPT

## 2021-12-01 RX ORDER — CHOLECALCIFEROL (VITAMIN D3) 125 MCG
1 CAPSULE ORAL
COMMUNITY

## 2021-12-01 NOTE — PROGRESS NOTES
[facility]     Donta Ledesma Jr.  1955   [id number]      Patient Care Team:  Josh Ortiz MD as PCP - General (Internal Medicine)  Shankar Woodward MD as Consulting Physician (Urology)  Ryan Lopez MD as Consulting Physician (Gastroenterology)  Darryl Hayes MD as Consulting Physician (Ophthalmology)  Josh Ortiz MD as Referring Physician (Internal Medicine)    Chief Complaint::   Chief Complaint   Patient presents with   • Hyperlipidemia   • Hypertension   • Prediabetes        HPI  The patient comes in for follow-up of his hypertension, hyperlipidemia, prediabetes, chronic kidney disease, and depression.    He states that he is feeling well. His blood pressure looks good in the clinic today. His strength, and stamina are good. He walks 4 miles daily, and has kept his weight down. He reports that his mood is good.     He saw the nephrologist in 07/2021 or 08/2021, and was informed that everything looks good, and for him to return in 1 year.    He denies breathing difficulties, or chest pain. He states that his bones, and joints are okay.    He denies any concerns or issues today.        Chronic Conditions:      Patient Active Problem List   Diagnosis   • Irritable bowel syndrome   • Depression   • Annual physical exam   • Hypercholesterolemia   • Benign essential hypertension   • Chronic kidney disease, stage III (moderate) (HCC)   • Sleep apnea   • Disc disorder of cervical region   • Prediabetes   • Major depressive disorder with single episode, in full remission (HCC)        Past Medical History:   Diagnosis Date   • Back problem    • Benign prostate hyperplasia    • Glaucoma    • Hypertension    • Nephrolithiasis        Past Surgical History:   Procedure Laterality Date   • WISDOM TOOTH EXTRACTION         Family History   Problem Relation Age of Onset   • Hypertension Mother    • Cancer Mother    • Diabetes Father    • Cancer Father    • Hypertension  "Brother    • Cancer Maternal Grandfather    • Coronary artery disease Paternal Grandmother    • Stroke Paternal Grandfather        Social History     Socioeconomic History   • Marital status:    Tobacco Use   • Smoking status: Never Smoker   • Smokeless tobacco: Never Used   Vaping Use   • Vaping Use: Never used   Substance and Sexual Activity   • Alcohol use: Never   • Drug use: Never   • Sexual activity: Defer       Allergies   Allergen Reactions   • Clarithromycin Other (See Comments)     Unknown;  BIAXIN         Current Outpatient Medications:   •  amLODIPine (NORVASC) 10 MG tablet, Take 1 tablet by mouth Daily., Disp: 90 tablet, Rfl: 3  •  atorvastatin (LIPITOR) 20 MG tablet, TAKE ONE TABLET BY MOUTH DAILY, Disp: 90 tablet, Rfl: 3  •  buPROPion XL (WELLBUTRIN XL) 300 MG 24 hr tablet, Take 1 tablet by mouth Daily., Disp: 90 tablet, Rfl: 3  •  Cholecalciferol (Vitamin D3) 50 MCG (2000 UT) tablet, Take 1 tablet by mouth Daily., Disp: , Rfl:   •  doxazosin (CARDURA) 4 MG tablet, Take 1 tablet by mouth Every Night., Disp: 90 tablet, Rfl: 3  •  hydroCHLOROthiazide (HYDRODIURIL) 12.5 MG tablet, TAKE ONE TABLET BY MOUTH DAILY, Disp: 30 tablet, Rfl: 11  •  potassium chloride 10 MEQ CR tablet, TAKE ONE TABLET BY MOUTH TWICE A DAY, Disp: 180 tablet, Rfl: 1  •  sertraline (ZOLOFT) 25 MG tablet, TAKE ONE TABLET BY MOUTH DAILY, Disp: 90 tablet, Rfl: 3    Review of Systems   A review of systems was performed and positive findings noted in the HPI.    Vital Signs  Vitals:    12/01/21 1043   BP: 132/70   BP Location: Right arm   Patient Position: Sitting   Cuff Size: Large Adult   Pulse: 80   Temp: 98 °F (36.7 °C)   Weight: 96.4 kg (212 lb 9.6 oz)   Height: 188 cm (74.02\")   PainSc: 0-No pain       Physical Exam  Constitutional:       Appearance: He is well-developed.   HENT:      Head: Normocephalic.      Right Ear: External ear normal.      Left Ear: External ear normal.      Nose: Nose normal.   Eyes:      General: No " scleral icterus.        Right eye: No discharge.         Left eye: No discharge.      Conjunctiva/sclera: Conjunctivae normal.      Pupils: Pupils are equal, round, and reactive to light.   Neck:      Thyroid: No thyromegaly.      Vascular: No carotid bruit or JVD.   Cardiovascular:      Rate and Rhythm: Normal rate and regular rhythm.      Pulses:           Dorsalis pedis pulses are 2+ on the right side.        Posterior tibial pulses are 2+ on the right side.      Heart sounds: Normal heart sounds. No murmur heard.  No friction rub. No gallop.    Pulmonary:      Effort: Pulmonary effort is normal.      Breath sounds: Normal breath sounds. No stridor. No wheezing.   Abdominal:      General: Bowel sounds are normal.      Palpations: Abdomen is soft. There is no mass.      Tenderness: There is no abdominal tenderness.      Hernia: No hernia is present.   Musculoskeletal:         General: No tenderness or deformity. Normal range of motion.      Cervical back: Neck supple.   Lymphadenopathy:      Cervical: No cervical adenopathy.   Skin:     General: Skin is warm and dry.      Findings: No erythema or rash.   Neurological:      Mental Status: He is alert and oriented to person, place, and time.      Cranial Nerves: No cranial nerve deficit.      Motor: No abnormal muscle tone.      Coordination: Coordination normal.      Deep Tendon Reflexes: Reflexes are normal and symmetric. Reflexes normal.      Comments: No focal deficits no lateralizing signs   Psychiatric:         Behavior: Behavior normal. Behavior is cooperative.         Thought Content: Thought content normal.         Judgment: Judgment normal.          Procedures    ACE III MINI             Assessment/Plan:    Diagnoses and all orders for this visit:    1. Benign essential hypertension (Primary)    2. Hypercholesterolemia  -     Comprehensive Metabolic Panel; Future  -     Lipid Panel; Future    3. Prediabetes  -     Hemoglobin A1c; Future    4. Stage 3b  chronic kidney disease (HCC)    5. Major depressive disorder with single episode, in full remission (HCC)        1. Hypertension  - Blood pressure is well-controlled on hydrochlorothiazide, doxazosin, and amlodipine.    2. Hyperlipidemia  - Lipid panel is pending. He will continue atorvastatin, and healthy diet.    3. Prediabetes  - HbA1c is pending. The treatment remains a healthy diet, and avoidance of weight gain.     4. Chronic kidney disease  - GFR has been stable. He now sees nephrology twice per year. The treatment remains control of blood pressure, blood glucose, and avoidance of NSAIDs.    5. Depression  - Depression is in remission on sertraline, and bupropion.    He will follow up in 6 months at which time we will consider moving his visits to once per year.      Plan of care reviewed with patient at the conclusion of today's visit. Education was provided regarding diagnosis, management, and any prescribed or recommended OTC medications.Patient verbalizes understanding of and agreement with management plan.         Josh Ortiz MD         Transcribed from ambient dictation for Josh Ortiz MD by Bailey Rosas.  12/01/21   14:42 EST    Patient verbalized consent to the visit recording.

## 2021-12-02 LAB
ALBUMIN SERPL-MCNC: 4.4 G/DL (ref 3.5–5.2)
ALBUMIN/GLOB SERPL: 1.4 G/DL
ALP SERPL-CCNC: 123 U/L (ref 39–117)
ALT SERPL W P-5'-P-CCNC: 31 U/L (ref 1–41)
ANION GAP SERPL CALCULATED.3IONS-SCNC: 6.6 MMOL/L (ref 5–15)
AST SERPL-CCNC: 26 U/L (ref 1–40)
BILIRUB SERPL-MCNC: 0.6 MG/DL (ref 0–1.2)
BUN SERPL-MCNC: 23 MG/DL (ref 8–23)
BUN/CREAT SERPL: 14.4 (ref 7–25)
CALCIUM SPEC-SCNC: 9.3 MG/DL (ref 8.6–10.5)
CHLORIDE SERPL-SCNC: 101 MMOL/L (ref 98–107)
CO2 SERPL-SCNC: 27.4 MMOL/L (ref 22–29)
CREAT SERPL-MCNC: 1.6 MG/DL (ref 0.76–1.27)
GFR SERPL CREATININE-BSD FRML MDRD: 43 ML/MIN/1.73
GLOBULIN UR ELPH-MCNC: 3.1 GM/DL
GLUCOSE SERPL-MCNC: 85 MG/DL (ref 65–99)
HBA1C MFR BLD: 5.49 % (ref 4.8–5.6)
POTASSIUM SERPL-SCNC: 3.4 MMOL/L (ref 3.5–5.2)
PROT SERPL-MCNC: 7.5 G/DL (ref 6–8.5)
SODIUM SERPL-SCNC: 135 MMOL/L (ref 136–145)

## 2022-01-12 RX ORDER — SERTRALINE HYDROCHLORIDE 25 MG/1
TABLET, FILM COATED ORAL
Qty: 90 TABLET | Refills: 3 | Status: SHIPPED | OUTPATIENT
Start: 2022-01-12 | End: 2023-01-11 | Stop reason: SDUPTHER

## 2022-03-09 RX ORDER — ATORVASTATIN CALCIUM 20 MG/1
TABLET, FILM COATED ORAL
Qty: 90 TABLET | Refills: 3 | Status: SHIPPED | OUTPATIENT
Start: 2022-03-09 | End: 2023-03-07 | Stop reason: SDUPTHER

## 2022-05-18 RX ORDER — POTASSIUM CHLORIDE 750 MG/1
TABLET, FILM COATED, EXTENDED RELEASE ORAL
Qty: 180 TABLET | Refills: 1 | Status: SHIPPED | OUTPATIENT
Start: 2022-05-18 | End: 2022-11-16

## 2022-05-18 NOTE — TELEPHONE ENCOUNTER
Rx Refill Note  Requested Prescriptions     Pending Prescriptions Disp Refills   • potassium chloride 10 MEQ CR tablet [Pharmacy Med Name: POTASSIUM CHLORIDE ER 10MEQ TABLET] 180 tablet 1     Sig: TAKE ONE TABLET BY MOUTH TWICE A DAY      Last office visit with prescribing clinician: 12/1/2021      Next office visit with prescribing clinician: 6/30/2022            Mita Bunch RN  05/18/22, 10:59 EDT

## 2022-05-24 RX ORDER — HYDROCHLOROTHIAZIDE 12.5 MG/1
TABLET ORAL
Qty: 30 TABLET | Refills: 11 | Status: SHIPPED | OUTPATIENT
Start: 2022-05-24 | End: 2023-01-17 | Stop reason: SDUPTHER

## 2022-06-30 ENCOUNTER — LAB (OUTPATIENT)
Dept: LAB | Facility: HOSPITAL | Age: 67
End: 2022-06-30

## 2022-06-30 ENCOUNTER — OFFICE VISIT (OUTPATIENT)
Dept: INTERNAL MEDICINE | Facility: CLINIC | Age: 67
End: 2022-06-30

## 2022-06-30 VITALS
HEIGHT: 72 IN | DIASTOLIC BLOOD PRESSURE: 74 MMHG | WEIGHT: 209.6 LBS | TEMPERATURE: 98.2 F | BODY MASS INDEX: 28.39 KG/M2 | SYSTOLIC BLOOD PRESSURE: 120 MMHG | HEART RATE: 76 BPM

## 2022-06-30 DIAGNOSIS — K58.0 IRRITABLE BOWEL SYNDROME WITH DIARRHEA: ICD-10-CM

## 2022-06-30 DIAGNOSIS — Z00.00 ANNUAL PHYSICAL EXAM: Primary | ICD-10-CM

## 2022-06-30 DIAGNOSIS — Z12.5 PROSTATE CANCER SCREENING: ICD-10-CM

## 2022-06-30 DIAGNOSIS — E78.00 HYPERCHOLESTEROLEMIA: ICD-10-CM

## 2022-06-30 DIAGNOSIS — N18.32 STAGE 3B CHRONIC KIDNEY DISEASE: ICD-10-CM

## 2022-06-30 DIAGNOSIS — R73.03 PREDIABETES: ICD-10-CM

## 2022-06-30 DIAGNOSIS — I10 PRIMARY HYPERTENSION: ICD-10-CM

## 2022-06-30 DIAGNOSIS — F32.5 MAJOR DEPRESSIVE DISORDER WITH SINGLE EPISODE, IN FULL REMISSION: ICD-10-CM

## 2022-06-30 LAB
ALBUMIN SERPL-MCNC: 3.8 G/DL (ref 3.5–5.2)
ALBUMIN UR-MCNC: <1.2 MG/DL
ALBUMIN/GLOB SERPL: 1 G/DL
ALP SERPL-CCNC: 120 U/L (ref 39–117)
ALT SERPL W P-5'-P-CCNC: 25 U/L (ref 1–41)
ANION GAP SERPL CALCULATED.3IONS-SCNC: 9.5 MMOL/L (ref 5–15)
AST SERPL-CCNC: 24 U/L (ref 1–40)
BASOPHILS # BLD AUTO: 0.06 10*3/MM3 (ref 0–0.2)
BASOPHILS NFR BLD AUTO: 1.3 % (ref 0–1.5)
BILIRUB SERPL-MCNC: 0.5 MG/DL (ref 0–1.2)
BUN SERPL-MCNC: 22 MG/DL (ref 8–23)
BUN/CREAT SERPL: 14.2 (ref 7–25)
CALCIUM SPEC-SCNC: 8.9 MG/DL (ref 8.6–10.5)
CHLORIDE SERPL-SCNC: 103 MMOL/L (ref 98–107)
CHOLEST SERPL-MCNC: 150 MG/DL (ref 0–200)
CO2 SERPL-SCNC: 23.5 MMOL/L (ref 22–29)
CREAT SERPL-MCNC: 1.55 MG/DL (ref 0.76–1.27)
CREAT UR-MCNC: 219.7 MG/DL
DEPRECATED RDW RBC AUTO: 40.1 FL (ref 37–54)
EGFRCR SERPLBLD CKD-EPI 2021: 48.8 ML/MIN/1.73
EOSINOPHIL # BLD AUTO: 0.17 10*3/MM3 (ref 0–0.4)
EOSINOPHIL NFR BLD AUTO: 3.6 % (ref 0.3–6.2)
ERYTHROCYTE [DISTWIDTH] IN BLOOD BY AUTOMATED COUNT: 12.5 % (ref 12.3–15.4)
GLOBULIN UR ELPH-MCNC: 3.9 GM/DL
GLUCOSE SERPL-MCNC: 102 MG/DL (ref 65–99)
HBA1C MFR BLD: 5.7 % (ref 4.8–5.6)
HCT VFR BLD AUTO: 41.2 % (ref 37.5–51)
HDLC SERPL-MCNC: 38 MG/DL (ref 40–60)
HGB BLD-MCNC: 14.2 G/DL (ref 13–17.7)
IMM GRANULOCYTES # BLD AUTO: 0.01 10*3/MM3 (ref 0–0.05)
IMM GRANULOCYTES NFR BLD AUTO: 0.2 % (ref 0–0.5)
LDLC SERPL CALC-MCNC: 96 MG/DL (ref 0–100)
LDLC/HDLC SERPL: 2.49 {RATIO}
LYMPHOCYTES # BLD AUTO: 1.24 10*3/MM3 (ref 0.7–3.1)
LYMPHOCYTES NFR BLD AUTO: 25.9 % (ref 19.6–45.3)
MCH RBC QN AUTO: 30.5 PG (ref 26.6–33)
MCHC RBC AUTO-ENTMCNC: 34.5 G/DL (ref 31.5–35.7)
MCV RBC AUTO: 88.6 FL (ref 79–97)
MICROALBUMIN/CREAT UR: NORMAL MG/G{CREAT}
MONOCYTES # BLD AUTO: 0.43 10*3/MM3 (ref 0.1–0.9)
MONOCYTES NFR BLD AUTO: 9 % (ref 5–12)
NEUTROPHILS NFR BLD AUTO: 2.87 10*3/MM3 (ref 1.7–7)
NEUTROPHILS NFR BLD AUTO: 60 % (ref 42.7–76)
NRBC BLD AUTO-RTO: 0 /100 WBC (ref 0–0.2)
PLATELET # BLD AUTO: 239 10*3/MM3 (ref 140–450)
PMV BLD AUTO: 9.2 FL (ref 6–12)
POTASSIUM SERPL-SCNC: 3.7 MMOL/L (ref 3.5–5.2)
PROT SERPL-MCNC: 7.7 G/DL (ref 6–8.5)
PSA SERPL-MCNC: 4.53 NG/ML (ref 0–4)
RBC # BLD AUTO: 4.65 10*6/MM3 (ref 4.14–5.8)
SODIUM SERPL-SCNC: 136 MMOL/L (ref 136–145)
TRIGL SERPL-MCNC: 86 MG/DL (ref 0–150)
VLDLC SERPL-MCNC: 16 MG/DL (ref 5–40)
WBC NRBC COR # BLD: 4.78 10*3/MM3 (ref 3.4–10.8)

## 2022-06-30 PROCEDURE — G0439 PPPS, SUBSEQ VISIT: HCPCS | Performed by: INTERNAL MEDICINE

## 2022-06-30 PROCEDURE — 82043 UR ALBUMIN QUANTITATIVE: CPT | Performed by: INTERNAL MEDICINE

## 2022-06-30 PROCEDURE — 80053 COMPREHEN METABOLIC PANEL: CPT | Performed by: INTERNAL MEDICINE

## 2022-06-30 PROCEDURE — 1159F MED LIST DOCD IN RCRD: CPT | Performed by: INTERNAL MEDICINE

## 2022-06-30 PROCEDURE — 85025 COMPLETE CBC W/AUTO DIFF WBC: CPT | Performed by: INTERNAL MEDICINE

## 2022-06-30 PROCEDURE — 82570 ASSAY OF URINE CREATININE: CPT | Performed by: INTERNAL MEDICINE

## 2022-06-30 PROCEDURE — 1170F FXNL STATUS ASSESSED: CPT | Performed by: INTERNAL MEDICINE

## 2022-06-30 PROCEDURE — 83036 HEMOGLOBIN GLYCOSYLATED A1C: CPT | Performed by: INTERNAL MEDICINE

## 2022-06-30 PROCEDURE — 80061 LIPID PANEL: CPT | Performed by: INTERNAL MEDICINE

## 2022-06-30 PROCEDURE — G0103 PSA SCREENING: HCPCS | Performed by: INTERNAL MEDICINE

## 2022-06-30 RX ORDER — DOXAZOSIN MESYLATE 4 MG/1
4 TABLET ORAL NIGHTLY
Qty: 90 TABLET | Refills: 3 | Status: SHIPPED | OUTPATIENT
Start: 2022-06-30

## 2022-06-30 RX ORDER — AMLODIPINE BESYLATE 10 MG/1
10 TABLET ORAL DAILY
Qty: 90 TABLET | Refills: 3 | Status: SHIPPED | OUTPATIENT
Start: 2022-06-30

## 2022-06-30 RX ORDER — BUPROPION HYDROCHLORIDE 300 MG/1
300 TABLET ORAL DAILY
Qty: 90 TABLET | Refills: 3 | Status: SHIPPED | OUTPATIENT
Start: 2022-06-30

## 2022-06-30 NOTE — PROGRESS NOTES
QUICK REFERENCE INFORMATION:  The ABCs of the Annual Wellness Visit    Subsequent Medicare Wellness Visit    HEALTH RISK ASSESSMENT    1955    Recent Hospitalizations:  No hospitalization(s) within the last year..        Current Medical Providers:  Patient Care Team:  Josh Ortiz MD as PCP - General (Internal Medicine)  Shankar Woodward MD as Consulting Physician (Urology)  Ryan Lopez MD as Consulting Physician (Gastroenterology)  Darryl Hayes MD as Consulting Physician (Ophthalmology)  Josh Ortiz MD as Referring Physician (Internal Medicine)        Smoking Status:  Social History     Tobacco Use   Smoking Status Never Smoker   Smokeless Tobacco Never Used       Alcohol Consumption:  Social History     Substance and Sexual Activity   Alcohol Use Never       Depression Screen:   PHQ-2/PHQ-9 Depression Screening 6/30/2022   Retired PHQ-9 Total Score -   Retired Total Score -   Little Interest or Pleasure in Doing Things 0-->not at all   Feeling Down, Depressed or Hopeless 0-->not at all   PHQ-9: Brief Depression Severity Measure Score 0       Health Habits and Functional and Cognitive Screening:  Functional & Cognitive Status 6/30/2022   Do you have difficulty preparing food and eating? No   Do you have difficulty bathing yourself, getting dressed or grooming yourself? No   Do you have difficulty using the toilet? No   Do you have difficulty moving around from place to place? No   Do you have trouble with steps or getting out of a bed or a chair? No   Current Diet Well Balanced Diet   Dental Exam Up to date   Eye Exam Up to date   Exercise (times per week) 7 times per week   Current Exercises Include Walking        Exercise Comment golf   Current Exercise Activities Include -   Do you need help using the phone?  No   Are you deaf or do you have serious difficulty hearing?  No   Do you need help with transportation? No   Do you need help shopping? No   Do you need help  preparing meals?  No   Do you need help with housework?  No   Do you need help with laundry? No   Do you need help taking your medications? No   Do you need help managing money? No   Do you ever drive or ride in a car without wearing a seat belt? No   Have you felt unusual stress, anger or loneliness in the last month? No   Who do you live with? Spouse   If you need help, do you have trouble finding someone available to you? No   Have you been bothered in the last four weeks by sexual problems? No   Do you have difficulty concentrating, remembering or making decisions? No       Fall Risk Screen:  [facility] Fall Risk Assessment was completed, and patient is at LOW risk for falls.Assessment completed on:6/30/2022    ACE III MINI        Does the patient have evidence of cognitive impairment? No    Aspirin use counseling: Does not need ASA (and currently is not on it)    Recent Lab Results:  CMP:  Lab Results   Component Value Date    BUN 23 12/01/2021    CREATININE 1.60 (H) 12/01/2021    EGFRIFNONA 43 (L) 12/01/2021    BCR 14.4 12/01/2021     (L) 12/01/2021    K 3.4 (L) 12/01/2021    CO2 27.4 12/01/2021    CALCIUM 9.3 12/01/2021    ALBUMIN 4.40 12/01/2021    BILITOT 0.6 12/01/2021    ALKPHOS 123 (H) 12/01/2021    AST 26 12/01/2021    ALT 31 12/01/2021     HbA1c:  Lab Results   Component Value Date    HGBA1C 5.49 12/01/2021    HGBA1C 5.51 06/01/2021     Microalbumin:  Lab Results   Component Value Date    MICROALBUR <1.2 06/01/2021     Lipid Panel  Lab Results   Component Value Date    CHOL 181 12/01/2021    TRIG 103 12/01/2021    HDL 43 12/01/2021     (H) 12/01/2021    AST 26 12/01/2021    ALT 31 12/01/2021       Visual Acuity:  No exam data present    Age-appropriate Screening Schedule:  Refer to the list below for future screening recommendations based on patient's age, sex and/or medical conditions. Orders for these recommended tests are listed in the plan section. The patient has been provided with a  written plan.    Health Maintenance   Topic Date Due   • ZOSTER VACCINE (2 of 3) 11/24/2016   • TDAP/TD VACCINES (2 - Td or Tdap) 02/12/2019   • INFLUENZA VACCINE  10/01/2022   • LIPID PANEL  12/01/2022        Subjective   History of Present Illness    Donta Ledesma Jr. is a 66 y.o. male who presents for a Subsequent Wellness Visit. He comes in for Medicare annual wellness examination and for follow-up of hypertension, hyperlipidemia, prediabetes, irritable bowel syndrome, chronic kidney disease, and depression.    Hypertension  The patient states he has been feeling well. His blood pressure looks good today.    Chronic kidney disease  The patient states his creatinine number was the same when he went to the nephrologist a couple of months ago. He states it has been steady all this time. He states he was taking vitamin D 7 days a week, but his level came up, so he has decreased to 4 times a week. He states otherwise no changes. He states his energy level is good. He states his strength and stamina are good. He denies any breathing problems or pains in his chest.    Irritable bowel syndrome  The patient states he does not feel good, but he has basically become accustomed to his symptoms. He denies any diarrhea. He states he had a colonoscopy last year.    Depression  The patient states he has been taking the sertraline for so long that he does not think about it anymore. 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 He states he rarely has a problem anymore.     Health maintenance  The patient states he is up to date on his screening.    CHRONIC CONDITIONS    The following portions of the patient's history were reviewed and updated as appropriate: allergies, current medications, past family history, past medical history, past social history, past surgical history and problem list.    Outpatient Medications Prior to Visit   Medication Sig Dispense Refill    • atorvastatin (LIPITOR) 20 MG tablet TAKE ONE TABLET BY MOUTH DAILY 90 tablet 3   • Cholecalciferol (Vitamin D3) 50 MCG (2000 UT) tablet Take 1 tablet by mouth 4 (Four) Times a Week.     • hydroCHLOROthiazide (HYDRODIURIL) 12.5 MG tablet TAKE ONE TABLET BY MOUTH DAILY 30 tablet 11   • Melatonin 3 MG capsule Take 1 capsule by mouth Every Night.     • potassium chloride 10 MEQ CR tablet TAKE ONE TABLET BY MOUTH TWICE A  tablet 1   • sertraline (ZOLOFT) 25 MG tablet TAKE ONE TABLET BY MOUTH DAILY 90 tablet 3   • amLODIPine (NORVASC) 10 MG tablet Take 1 tablet by mouth Daily. 90 tablet 3   • buPROPion XL (WELLBUTRIN XL) 300 MG 24 hr tablet Take 1 tablet by mouth Daily. 90 tablet 3   • doxazosin (CARDURA) 4 MG tablet Take 1 tablet by mouth Every Night. 90 tablet 3     No facility-administered medications prior to visit.       Patient Active Problem List   Diagnosis   • Irritable bowel syndrome   • Annual physical exam   • Hypercholesterolemia   • Primary hypertension   • Chronic kidney disease, stage III (moderate) ([facility])   • Sleep apnea   • Disc disorder of cervical region   • Prediabetes   • Major depressive disorder with single episode, in full remission ([facility])       Advance Care Planning:  ACP discussion was held with the patient during this visit. Patient has an advance directive (not in EMR), copy requested.    Identification of Risk Factors:  Risk factors include: Advance Directive Discussion.    Review of Systems   Constitutional: Negative for chills, fatigue and fever.   HENT: Negative for congestion, ear pain and sinus pressure.    Respiratory: Negative for cough, chest tightness, shortness of breath and wheezing.    Cardiovascular: Negative for chest pain and palpitations.   Gastrointestinal: Negative for abdominal pain, blood in stool and constipation.   Skin: Negative for color change.   Allergic/Immunologic: Negative for environmental allergies.   Neurological: Negative for dizziness, speech  difficulty and headaches.   Psychiatric/Behavioral: Negative for confusion. The patient is not nervous/anxious.        Compared to one year ago, the patient feels his physical health is the same.  Compared to one year ago, the patient feels his mental health is the same.    Objective     Physical Exam  Vitals and nursing note reviewed.   Constitutional:       Appearance: He is well-developed.   HENT:      Head: Normocephalic and atraumatic.      Right Ear: External ear normal.      Left Ear: External ear normal.      Nose: Nose normal.      Mouth/Throat:      Pharynx: No oropharyngeal exudate.   Eyes:      Conjunctiva/sclera: Conjunctivae normal.      Pupils: Pupils are equal, round, and reactive to light.   Neck:      Thyroid: No thyromegaly.      Vascular: No JVD.   Cardiovascular:      Rate and Rhythm: Normal rate and regular rhythm.      Heart sounds: Normal heart sounds. No murmur heard.    No friction rub. No gallop.   Pulmonary:      Effort: Pulmonary effort is normal. No respiratory distress.      Breath sounds: Normal breath sounds. No wheezing or rales.   Chest:      Chest wall: No tenderness.   Abdominal:      General: Bowel sounds are normal. There is no distension.      Palpations: Abdomen is soft. There is no mass.      Tenderness: There is no abdominal tenderness. There is no guarding or rebound.      Hernia: No hernia is present.   Musculoskeletal:         General: No tenderness. Normal range of motion.      Cervical back: Normal range of motion and neck supple.   Lymphadenopathy:      Cervical: No cervical adenopathy.   Skin:     General: Skin is warm and dry.      Findings: No erythema or rash.      Comments: Skin exam reveals scattered lentigines and seborrheic keratoses. No suspicious lesions.   Neurological:      Mental Status: He is alert and oriented to person, place, and time.      Cranial Nerves: No cranial nerve deficit.      Sensory: No sensory deficit.      Motor: No abnormal muscle tone.  "     Coordination: Coordination normal.      Deep Tendon Reflexes: Reflexes normal.   Psychiatric:         Behavior: Behavior normal.         Thought Content: Thought content normal.         Judgment: Judgment normal.          Procedures     Vitals:    06/30/22 0817   BP: 120/74   BP Location: Left arm   Patient Position: Sitting   Cuff Size: Large Adult   Pulse: 76   Temp: 98.2 °F (36.8 °C)   Weight: 95.1 kg (209 lb 9.6 oz)   Height: 183.5 cm (72.25\")   PainSc: 0-No pain       BMI is >= 25 and <30. (Overweight) The following options were offered after discussion;: exercise counseling/recommendations and nutrition counseling/recommendations      Assessment & Plan   Problem List Items Addressed This Visit        Cardiac and Vasculature    Hypercholesterolemia    Relevant Medications    atorvastatin (LIPITOR) 20 MG tablet    Other Relevant Orders    Comprehensive Metabolic Panel    Lipid Panel    Primary hypertension    Relevant Medications    hydroCHLOROthiazide (HYDRODIURIL) 12.5 MG tablet    amLODIPine (NORVASC) 10 MG tablet    doxazosin (CARDURA) 4 MG tablet    Other Relevant Orders    CBC & Differential    Microalbumin / Creatinine Urine Ratio - Urine, Clean Catch       Endocrine and Metabolic    Prediabetes    Relevant Orders    Hemoglobin A1c       Gastrointestinal Abdominal     Irritable bowel syndrome       Genitourinary and Reproductive     Chronic kidney disease, stage III (moderate) (HCC)    Relevant Medications    hydroCHLOROthiazide (HYDRODIURIL) 12.5 MG tablet       Health Encounters    Annual physical exam - Primary       Mental Health    Major depressive disorder with single episode, in full remission (HCC)    Relevant Medications    sertraline (ZOLOFT) 25 MG tablet    buPROPion XL (WELLBUTRIN XL) 300 MG 24 hr tablet      Other Visit Diagnoses     Prostate cancer screening        Relevant Orders    PSA Screen          1. Prevention  - Overall, he remains in good health with good lifestyle habits. He " has had the first booster for COVID-19. We discussed getting the second booster. He is typically at fairly low risk other than age 66. He had colonoscopy last year, due at 5 years.    2. Hypertension  - Blood pressure is well controlled on amlodipine, doxazosin, and hydrochlorothiazide.    3. Hyperlipidemia  - Lipid panel is pending. He will continue atorvastatin and healthy diet.    4. Prediabetes  - A1c is pending. The treatment is healthy diet and avoidance of weight gain.    5. Irritable bowel syndrome  - This has been controlled with diet and with his antidepressant regimen.    6. Chronic kidney disease  - This remains stable and is followed by nephrology.    7. Depression  - He will continue bupropion and sertraline. This seems to be in remission.    8. Sleep apnea  - He continues CPAP.    Follow up in 6 months.    Patient Self-Management and Personalized Health Advice  The patient has been provided with information about: diet, exercise and weight management and preventive services including:   · Annual Wellness Visit (AWV).    Outpatient Encounter Medications as of 6/30/2022   Medication Sig Dispense Refill   • amLODIPine (NORVASC) 10 MG tablet Take 1 tablet by mouth Daily. 90 tablet 3   • atorvastatin (LIPITOR) 20 MG tablet TAKE ONE TABLET BY MOUTH DAILY 90 tablet 3   • buPROPion XL (WELLBUTRIN XL) 300 MG 24 hr tablet Take 1 tablet by mouth Daily. 90 tablet 3   • Cholecalciferol (Vitamin D3) 50 MCG (2000 UT) tablet Take 1 tablet by mouth 4 (Four) Times a Week.     • doxazosin (CARDURA) 4 MG tablet Take 1 tablet by mouth Every Night. 90 tablet 3   • hydroCHLOROthiazide (HYDRODIURIL) 12.5 MG tablet TAKE ONE TABLET BY MOUTH DAILY 30 tablet 11   • Melatonin 3 MG capsule Take 1 capsule by mouth Every Night.     • potassium chloride 10 MEQ CR tablet TAKE ONE TABLET BY MOUTH TWICE A  tablet 1   • sertraline (ZOLOFT) 25 MG tablet TAKE ONE TABLET BY MOUTH DAILY 90 tablet 3   • [DISCONTINUED] amLODIPine  (NORVASC) 10 MG tablet Take 1 tablet by mouth Daily. 90 tablet 3   • [DISCONTINUED] buPROPion XL (WELLBUTRIN XL) 300 MG 24 hr tablet Take 1 tablet by mouth Daily. 90 tablet 3   • [DISCONTINUED] doxazosin (CARDURA) 4 MG tablet Take 1 tablet by mouth Every Night. 90 tablet 3     No facility-administered encounter medications on file as of 6/30/2022.       Reviewed use of high risk medication in the elderly: yes  Reviewed for potential of harmful drug interactions in the elderly: yes    Follow Up:  Return in about 6 months (around 12/30/2022) for follow up fasting.     There are no Patient Instructions on file for this visit.    An After Visit Summary and PPPS with all of these plans were given to the patient.          Transcribed from ambient dictation for Josh Ortiz MD by Yanet Mosley.  06/30/22   10:43 EDT    Patient verbalized consent to the visit recording.

## 2022-10-27 NOTE — TELEPHONE ENCOUNTER
Patient called in this morning stating that the pharmacy tried to renew his LIPITOR and said that it was denied by the provider, when he has had this RX for years.   
RX has been sent   
Yes

## 2022-11-03 ENCOUNTER — FLU SHOT (OUTPATIENT)
Dept: INTERNAL MEDICINE | Facility: CLINIC | Age: 67
End: 2022-11-03

## 2022-11-03 DIAGNOSIS — Z23 NEED FOR INFLUENZA VACCINATION: Primary | ICD-10-CM

## 2022-11-03 PROCEDURE — G0008 ADMIN INFLUENZA VIRUS VAC: HCPCS | Performed by: INTERNAL MEDICINE

## 2022-11-03 PROCEDURE — 90662 IIV NO PRSV INCREASED AG IM: CPT | Performed by: INTERNAL MEDICINE

## 2022-11-16 RX ORDER — POTASSIUM CHLORIDE 750 MG/1
TABLET, FILM COATED, EXTENDED RELEASE ORAL
Qty: 180 TABLET | Refills: 1 | Status: SHIPPED | OUTPATIENT
Start: 2022-11-16

## 2022-11-23 PROCEDURE — U0005 INFEC AGEN DETEC AMPLI PROBE: HCPCS | Performed by: PHYSICIAN ASSISTANT

## 2022-11-23 PROCEDURE — U0004 COV-19 TEST NON-CDC HGH THRU: HCPCS | Performed by: PHYSICIAN ASSISTANT

## 2022-11-28 RX ORDER — OSELTAMIVIR PHOSPHATE 30 MG/1
30 CAPSULE ORAL EVERY 12 HOURS SCHEDULED
Qty: 10 CAPSULE | Refills: 0 | Status: SHIPPED | OUTPATIENT
Start: 2022-11-28 | End: 2022-11-29 | Stop reason: DRUGHIGH

## 2022-11-29 RX ORDER — OSELTAMIVIR PHOSPHATE 75 MG/1
75 CAPSULE ORAL DAILY
Qty: 10 CAPSULE | Refills: 0 | Status: SHIPPED | OUTPATIENT
Start: 2022-11-29 | End: 2023-01-17

## 2022-11-29 NOTE — TELEPHONE ENCOUNTER
Called and spoke with Quan. He stated the normal dose for adults for prophylactic is 75mg once daily x 10 days. Please update rx and resend.

## 2022-11-29 NOTE — TELEPHONE ENCOUNTER
Pharmacy Name: [facility] [id number] - [place], [place] - [address] AT [facility] [address] - [phone]  - [phone]     Pharmacy representative name: NEHEMIAH    Pharmacy representative phone number:[phone]    What medication are you calling in regards to:  oseltamivir (TAMIFLU) 30 MG capsule     What question does the pharmacy have: VERIFY DOSAGE    Who is the provider that prescribed the medication: ASHLEY DANG

## 2023-01-11 RX ORDER — SERTRALINE HYDROCHLORIDE 25 MG/1
25 TABLET, FILM COATED ORAL DAILY
Qty: 90 TABLET | Refills: 0 | Status: SHIPPED | OUTPATIENT
Start: 2023-01-11 | End: 2023-01-17 | Stop reason: SDUPTHER

## 2023-01-11 NOTE — TELEPHONE ENCOUNTER
Caller: Donta Ledesma Jr.    Relationship: Self    Best call back number: [phone]    Requested Prescriptions:   Requested Prescriptions     Pending Prescriptions Disp Refills   • sertraline (ZOLOFT) 25 MG tablet 90 tablet 3     Sig: Take 1 tablet by mouth Daily.        Pharm[facility] be sent[id number] [place] [id number] [facility] AT [facility] [phone] [facility] [phone]      Additional details provided by patient: PATIENT HAS ENOUGH TO GET TO Saturday     Does the patient have less than a 3 day supply:  [] Yes  [x] No    Would you like a call back once the refill request has been completed: [x] Yes [] No    If the office needs to give you a call back, can they leave a voicemail: [x] Yes [] No    Darien Collins Rep   01/11/23 11:41 EST

## 2023-01-16 ENCOUNTER — OFFICE VISIT (OUTPATIENT)
Dept: SLEEP MEDICINE | Facility: CLINIC | Age: 68
End: 2023-01-16
Payer: MEDICARE

## 2023-01-16 VITALS
OXYGEN SATURATION: 96 % | HEIGHT: 74 IN | SYSTOLIC BLOOD PRESSURE: 126 MMHG | DIASTOLIC BLOOD PRESSURE: 78 MMHG | HEART RATE: 94 BPM | WEIGHT: 221 LBS | BODY MASS INDEX: 28.36 KG/M2

## 2023-01-16 DIAGNOSIS — G47.33 OBSTRUCTIVE SLEEP APNEA, ADULT: Primary | ICD-10-CM

## 2023-01-16 PROCEDURE — 99203 OFFICE O/P NEW LOW 30 MIN: CPT | Performed by: NURSE PRACTITIONER

## 2023-01-16 NOTE — PROGRESS NOTES
Chief Complaint  Sleep Apnea (NEW PATIENT )    Subjective     History of Present Illness:  oDnta Ledesma Jr. is a 67 y.o. male with a history of HTN, BPH, glaucoma, and CRISTINE on CPAP.  Patient presents to [facility] care having previously been followed by Dr. Donato for CRISTINE.  Patient reports snoring, witnessed apnea, and frequent nighttime urination.  This been ongoing for over 5 years.  He is presently on PAP therapy.  Patient typically goes to bed 11:30 PM waking at 8 AM on weekdays and weekends.  He gets approximately 8 hours of sleep at night and it takes about 20 minutes for him to get to sleep.  He does wake feeling rested while using the device and does not take naps.  He denies use of tobacco, alcohol, or illicit drugs.  Patient drinks 1 regular cola a day.    Further details are as follows:    Broadway Scale is (out of 24): Total score: 2     Estimated average amount of sleep per night: 8  Number of times he wakes up at night: 2  Difficulty falling back asleep: no  It usually takes 20 minutes to go to sleep.  He feels sleepy upon waking up: no  Rotating or night shift work: no    Weight:       01/16/23  1329   Weight: 100 kg (221 lb)      Weight change in the last year:  loss: 10 lbs    The patient's relevant past medical, surgical, family, and social history reviewed and updated in Epic as appropriate.    Review of Systems   Constitutional: Negative.    HENT: Positive for rhinorrhea.    Eyes: Positive for redness.   Respiratory: Positive for apnea.    Cardiovascular: Negative.    Gastrointestinal: Negative.    Endocrine: Negative.    Genitourinary: Negative.    Musculoskeletal: Positive for back pain.   Skin: Negative.    Allergic/Immunologic: Negative.    Neurological: Negative.    Hematological: Negative.    Psychiatric/Behavioral: Negative.    All other systems reviewed and are negative.      PMH:    Past Medical History:   Diagnosis Date   • Back problem    • Benign prostate hyperplasia    •  "Glaucoma    • Hypertension    • Nephrolithiasis      Past Surgical History:   Procedure Laterality Date   • WISDOM TOOTH EXTRACTION         Allergies   Allergen Reactions   • Clarithromycin Other (See Comments)     Unknown;  BIAXIN       MEDS:  Prior to Admission medications    Medication Sig Start Date End Date Taking? Authorizing Provider   amLODIPine (NORVASC) 10 MG tablet Take 1 tablet by mouth Daily. 6/30/22   Josh Ortiz MD   atorvastatin (LIPITOR) 20 MG tablet TAKE ONE TABLET BY MOUTH DAILY 3/9/22   Josh Ortiz MD   buPROPion XL (WELLBUTRIN XL) 300 MG 24 hr tablet Take 1 tablet by mouth Daily. 6/30/22   Josh Ortiz MD   Cholecalciferol (Vitamin D3) 50 MCG (2000 UT) tablet Take 1 tablet by mouth 4 (Four) Times a Week.    ProviderAlberto MD   doxazosin (CARDURA) 4 MG tablet Take 1 tablet by mouth Every Night. 6/30/22   Josh Ortiz MD   hydroCHLOROthiazide (HYDRODIURIL) 12.5 MG tablet TAKE ONE TABLET BY MOUTH DAILY 5/24/22   Josh Ortiz MD   Melatonin 3 MG capsule Take 1 capsule by mouth Every Night.    ProviderAlberto MD   oseltamivir (Tamiflu) 75 MG capsule Take 1 capsule by mouth Daily. 11/29/22   Lanny Dee PA-C   potassium chloride 10 MEQ CR tablet TAKE ONE TABLET BY MOUTH TWICE A DAY 11/16/22   Josh Ortiz MD   sertraline (ZOLOFT) 25 MG tablet Take 1 tablet by mouth Daily. 1/11/23   Josh Ortiz MD       FH:  Family History   Problem Relation Age of Onset   • Hypertension Mother    • Cancer Mother    • Diabetes Father    • Cancer Father    • Hypertension Brother    • Cancer Maternal Grandfather    • Coronary artery disease Paternal Grandmother    • Stroke Paternal Grandfather        Objective   Vital Signs:  /78   Pulse 94   Ht 188 cm (74\")   Wt 100 kg (221 lb)   SpO2 96% Comment: RESTING ROOMA IR  BMI 28.37 kg/m²     BMI is >= 25 and <30. (Overweight) The following options were offered after " discussion;: Working on weight and has lost about 10 lbs.        Physical Exam  Vitals reviewed.   Constitutional:       Appearance: Normal appearance.   HENT:      Head: Normocephalic and atraumatic.      Nose: Nose normal.      Mouth/Throat:      Mouth: Mucous membranes are moist.   Cardiovascular:      Rate and Rhythm: Normal rate and regular rhythm.      Heart sounds: No murmur heard.    No friction rub. No gallop.   Pulmonary:      Effort: Pulmonary effort is normal. No respiratory distress.      Breath sounds: Normal breath sounds. No wheezing or rhonchi.   Neurological:      Mental Status: He is alert and oriented to person, place, and time.   Psychiatric:         Behavior: Behavior normal.       Mallampati Score: IV (only hard palate visible)    Result Review :  The following data was reviewed by: PERFECTO Vegas on 01/16/2023:    Data reviewed: CPAP report   CPAP report:  AHI: 1.9/h  Overall usage: 89/90 (98.9%)  Greater than 4-hour usage: 98.9%  Average usage (days used): 7 hours 51 minutes  90th percentile pressure: 9.5 cm H2O  Settings: Auto CPAP-a flex-4/20 cm H2O, a flex-2, ramp type-linear, ramp time 15 minutes, ramp start pressure 4 cm H2O.       Assessment and Plan  Donta Ledesma Jr. is a 67 y.o. male who presents to establish care for obstructive sleep apnea on PAP therapy.  Patient had previously been followed by Dr. Donato who has since retired. He just received his new mcnamara recall device about a month ago. He is in full compliance with greater than 4 hours usage at 98.9%, and AHI of 1.9.  I will refill the patient's supplies today and he will return for follow-up and compliance in 1 year or sooner should he have further questions or concerns.  I have discussed weight loss as it pertains to obstructive sleep apnea.    Diagnoses and all orders for this visit:    1. Obstructive sleep apnea, adult (Primary)  -     PAP Therapy               I discussed the consequences of  uncontrolled sleep apnea including hypertension, heart disease, diabetes, stroke, and dementia. I further discussed sleep apnea therapeutic options including CPAP, Weight loss, Oral dental appliance, and surgery.         Follow Up  Return in about 1 year (around 1/16/2024) for Annual visit.  Patient was given instructions and counseling regarding his condition or for health maintenance advice. Please see specific information pulled into the AVS if appropriate.     PERFECTO Sauceda, ACNP-BC  Pulmonology, Critical Care, and Sleep Medicine

## 2023-01-17 ENCOUNTER — OFFICE VISIT (OUTPATIENT)
Dept: INTERNAL MEDICINE | Facility: CLINIC | Age: 68
End: 2023-01-17
Payer: MEDICARE

## 2023-01-17 VITALS
HEART RATE: 88 BPM | WEIGHT: 215 LBS | TEMPERATURE: 98.2 F | DIASTOLIC BLOOD PRESSURE: 70 MMHG | HEIGHT: 74 IN | BODY MASS INDEX: 27.59 KG/M2 | SYSTOLIC BLOOD PRESSURE: 122 MMHG

## 2023-01-17 DIAGNOSIS — R73.03 PREDIABETES: ICD-10-CM

## 2023-01-17 DIAGNOSIS — N18.32 STAGE 3B CHRONIC KIDNEY DISEASE: ICD-10-CM

## 2023-01-17 DIAGNOSIS — E78.00 HYPERCHOLESTEROLEMIA: ICD-10-CM

## 2023-01-17 DIAGNOSIS — I10 PRIMARY HYPERTENSION: Primary | ICD-10-CM

## 2023-01-17 DIAGNOSIS — F32.5 MAJOR DEPRESSIVE DISORDER WITH SINGLE EPISODE, IN FULL REMISSION: ICD-10-CM

## 2023-01-17 PROCEDURE — 99214 OFFICE O/P EST MOD 30 MIN: CPT | Performed by: INTERNAL MEDICINE

## 2023-01-17 RX ORDER — SERTRALINE HYDROCHLORIDE 25 MG/1
25 TABLET, FILM COATED ORAL DAILY
Qty: 90 TABLET | Refills: 3 | Status: SHIPPED | OUTPATIENT
Start: 2023-01-17

## 2023-01-17 RX ORDER — HYDROCHLOROTHIAZIDE 12.5 MG/1
12.5 TABLET ORAL DAILY
Qty: 90 TABLET | Refills: 3 | Status: SHIPPED | OUTPATIENT
Start: 2023-01-17

## 2023-01-17 NOTE — PROGRESS NOTES
[facility]     Donta Ledesma Jr.  1955   [id number]      Patient Care Team:  Josh Ortiz MD as PCP - General (Internal Medicine)  Shankar Woodward MD as Consulting Physician (Urology)  Ryan Lopez MD as Consulting Physician (Gastroenterology)  Darryl Hayes MD as Consulting Physician (Ophthalmology)  Josh Ortiz MD as Referring Physician (Internal Medicine)    Chief Complaint:: The patient presents today for a follow-up of hypertension, hyperlipidemia, prediabetes, chronic kidney disease, and depression.  Chief Complaint   Patient presents with   • Hypertension   • Hyperlipidemia        HPI  Hypertension  The patient's blood pressure is within normal limits today. He confirms he is currently taking amlodipine and hydrochlorothiazide. He requests a refill of hydrochlorothiazide.    Chronic kidney disease  The patient reports that he is currently seeing a nephrologist every 4 to 6 months. He states he was last seen on 09/2022. He adds that he was told to follow up in 1 year.    Depression  The patient confirms he is currently taking sertraline and bupropion. He states he is unsure if the medications are helping him. He states he is afraid not to take the medications.    Health maintenance  The patient reports his strength and stamina are good. He states he tries to stay active with 4-mile daily walks, golf, or cutting grass. He notes he is doing well and enjoying intermediate.    Chronic Conditions: Hypertension, hyperlipidemia, prediabetes, chronic kidney disease, and depression.    Patient Active Problem List   Diagnosis   • Irritable bowel syndrome   • Annual physical exam   • Hypercholesterolemia   • Primary hypertension   • Chronic kidney disease, stage III (moderate) (HCC)   • Sleep apnea   • Disc disorder of cervical region   • Prediabetes   • Major depressive disorder with single episode, in full remission ([facility])        Past Medical History:    Diagnosis Date   • Back problem    • Benign prostate hyperplasia    • Glaucoma    • Hypertension    • Nephrolithiasis        Past Surgical History:   Procedure Laterality Date   • WISDOM TOOTH EXTRACTION         Family History   Problem Relation Age of Onset   • Hypertension Mother    • Cancer Mother    • Diabetes Father    • Cancer Father    • Hypertension Brother    • Cancer Maternal Grandfather    • Coronary artery disease Paternal Grandmother    • Stroke Paternal Grandfather        Social History     Socioeconomic History   • Marital status:    Tobacco Use   • Smoking status: Never     Passive exposure: Never   • Smokeless tobacco: Never   Vaping Use   • Vaping Use: Never used   Substance and Sexual Activity   • Alcohol use: Never   • Drug use: Never   • Sexual activity: Defer       Allergies   Allergen Reactions   • Clarithromycin Other (See Comments)     Unknown;  BIAXIN         Current Outpatient Medications:   •  amLODIPine (NORVASC) 10 MG tablet, Take 1 tablet by mouth Daily., Disp: 90 tablet, Rfl: 3  •  atorvastatin (LIPITOR) 20 MG tablet, TAKE ONE TABLET BY MOUTH DAILY, Disp: 90 tablet, Rfl: 3  •  buPROPion XL (WELLBUTRIN XL) 300 MG 24 hr tablet, Take 1 tablet by mouth Daily., Disp: 90 tablet, Rfl: 3  •  Cholecalciferol (Vitamin D3) 50 MCG (2000 UT) tablet, Take 1 tablet by mouth 4 (Four) Times a Week., Disp: , Rfl:   •  doxazosin (CARDURA) 4 MG tablet, Take 1 tablet by mouth Every Night., Disp: 90 tablet, Rfl: 3  •  hydroCHLOROthiazide (HYDRODIURIL) 12.5 MG tablet, Take 1 tablet by mouth Daily., Disp: 90 tablet, Rfl: 3  •  potassium chloride 10 MEQ CR tablet, TAKE ONE TABLET BY MOUTH TWICE A DAY, Disp: 180 tablet, Rfl: 1  •  sertraline (ZOLOFT) 25 MG tablet, Take 1 tablet by mouth Daily., Disp: 90 tablet, Rfl: 3    Review of Systems   Constitutional: Negative for chills, fatigue and fever.   HENT: Negative for congestion, ear pain and sinus pressure.    Respiratory: Negative for cough, chest  "tightness, shortness of breath and wheezing.    Cardiovascular: Negative for chest pain and palpitations.   Gastrointestinal: Negative for abdominal pain, blood in stool and constipation.   Skin: Negative for color change.   Allergic/Immunologic: Negative for environmental allergies.   Neurological: Negative for dizziness, speech difficulty and headache.   Psychiatric/Behavioral: Negative for decreased concentration. The patient is not nervous/anxious.         Vital Signs  Vitals:    01/17/23 0851   BP: 122/70   BP Location: Right arm   Patient Position: Sitting   Cuff Size: Large Adult   Pulse: 88   Temp: 98.2 °F (36.8 °C)   Weight: 97.5 kg (215 lb)   Height: 188 cm (74.02\")   PainSc: 0-No pain       Physical Exam  Vitals reviewed.   Constitutional:       Appearance: Normal appearance. He is well-developed.   HENT:      Head: Normocephalic and atraumatic.      Right Ear: Hearing, tympanic membrane, ear canal and external ear normal.      Left Ear: Hearing, tympanic membrane, ear canal and external ear normal.      Nose: Nose normal.      Mouth/Throat:      Pharynx: Uvula midline.   Eyes:      General: Lids are normal.      Conjunctiva/sclera: Conjunctivae normal.      Pupils: Pupils are equal, round, and reactive to light.   Cardiovascular:      Rate and Rhythm: Normal rate and regular rhythm.      Heart sounds: Normal heart sounds.   Pulmonary:      Effort: Pulmonary effort is normal.      Breath sounds: Normal breath sounds.   Abdominal:      General: Bowel sounds are normal.      Palpations: Abdomen is soft.   Musculoskeletal:         General: Normal range of motion.      Cervical back: Full passive range of motion without pain, normal range of motion and neck supple.   Skin:     General: Skin is warm and dry.   Neurological:      Mental Status: He is alert and oriented to person, place, and time.      Deep Tendon Reflexes: Reflexes are normal and symmetric.   Psychiatric:         Speech: Speech normal.         " Behavior: Behavior normal.         Thought Content: Thought content normal.         Judgment: Judgment normal.          Procedures    ACE III MINI             Assessment/Plan:    Diagnoses and all orders for this visit:    1. Primary hypertension (Primary)    2. Hypercholesterolemia  -     Comprehensive Metabolic Panel; Future  -     Lipid Panel; Future    3. Prediabetes  -     Hemoglobin A1c; Future    4. Stage 3b chronic kidney disease (HCC)    5. Major depressive disorder with single episode, in full remission (HCC)    Other orders  -     hydroCHLOROthiazide (HYDRODIURIL) 12.5 MG tablet; Take 1 tablet by mouth Daily.  Dispense: 90 tablet; Refill: 3  -     sertraline (ZOLOFT) 25 MG tablet; Take 1 tablet by mouth Daily.  Dispense: 90 tablet; Refill: 3        1. Hypertension  - Blood pressure is well controlled on amlodipine and hydrochlorothiazide.    2. Hyperlipidemia  - Lipid panel is pending. Continue atorvastatin and healthy diet.    3. Prediabetes  - Hemoglobin A1c is pending. The treatment remains a healthy diet and avoidance of weight gain.    4. Chronic kidney disease  - Glomerular filtration rate has remained stable. The treatment remains control of blood pressure and avoidance of non-steroidal anti-inflammatories. His nephrologist has now placed him on an annual follow-up.    5. Depression  - Mood appears to be well controlled. Depression is in remission on bupropion and low dose sertraline. If he chooses, he may wean sertraline this spring or summer.    He will follow up in 6 months.    Plan of care reviewed with patient at the conclusion of today's visit. Education was provided regarding diagnosis, management, and any prescribed or recommended OTC medications.Patient verbalizes understanding of and agreement with management plan.       Josh Ortiz MD       Answers for HPI/ROS submitted by the patient on 1/16/2023  What is the primary reason for your visit?: Physical    Transcribed from  ambient dictation for Josh Oritz MD by Janey Paniagua.  01/17/23   14:18 EST    Patient or patient representative verbalized consent to the visit recording.  I have personally performed the services described in this document as transcribed by the above individual, and it is both accurate and complete.

## 2023-01-19 ENCOUNTER — TELEPHONE (OUTPATIENT)
Dept: INTERNAL MEDICINE | Facility: CLINIC | Age: 68
End: 2023-01-19

## 2023-03-07 RX ORDER — ATORVASTATIN CALCIUM 20 MG/1
20 TABLET, FILM COATED ORAL DAILY
Qty: 90 TABLET | Refills: 3 | Status: SHIPPED | OUTPATIENT
Start: 2023-03-07

## 2023-03-07 NOTE — TELEPHONE ENCOUNTER
"Caller: Donta Ledesma Jr. \"Ed\"    Relationship: Self    Best call back number: [phone]    Requested Prescriptions:   Requested Prescriptions     Pending Prescriptions Disp Refills   • atorvastatin (LIPITOR) 20 MG tablet 90 tablet 3     Sig: Take 1 tablet by mouth Daily.        Pharmacy where request should be sent: [facility] #[id number] [place] [address] [address]  AT [facility] [phone] [facility] [phone] FX     Additional details provided by patient: PATIENT HAS AT LEAST 3 DAYS    Does the patient have less than a 3 day supply:  [] Yes  [x] No    Would you like a call back once the refill request has been completed: [x] Yes [] No    If the office needs to give you a call back, can they leave a voicemail: [x] Yes [] No    Darien Pitt Rep   03/07/23 09:40 EST   "

## 2023-04-11 RX ORDER — SERTRALINE HYDROCHLORIDE 25 MG/1
25 TABLET, FILM COATED ORAL DAILY
Qty: 90 TABLET | Refills: 3 | Status: SHIPPED | OUTPATIENT
Start: 2023-04-11

## 2023-05-02 DIAGNOSIS — N18.32 STAGE 3B CHRONIC KIDNEY DISEASE: ICD-10-CM

## 2023-05-02 DIAGNOSIS — I10 PRIMARY HYPERTENSION: Primary | ICD-10-CM

## 2023-05-02 DIAGNOSIS — R97.20 ABNORMAL PSA: ICD-10-CM

## 2023-05-03 ENCOUNTER — LAB (OUTPATIENT)
Dept: LAB | Facility: HOSPITAL | Age: 68
End: 2023-05-03
Payer: MEDICARE

## 2023-05-03 DIAGNOSIS — R73.03 PREDIABETES: ICD-10-CM

## 2023-05-03 DIAGNOSIS — N18.32 STAGE 3B CHRONIC KIDNEY DISEASE: ICD-10-CM

## 2023-05-03 DIAGNOSIS — E78.00 HYPERCHOLESTEROLEMIA: ICD-10-CM

## 2023-05-03 DIAGNOSIS — R97.20 ABNORMAL PSA: ICD-10-CM

## 2023-05-03 DIAGNOSIS — I10 PRIMARY HYPERTENSION: ICD-10-CM

## 2023-05-03 LAB
ALBUMIN SERPL-MCNC: 4.3 G/DL (ref 3.5–5.2)
ALBUMIN UR-MCNC: <1.2 MG/DL
ALBUMIN/GLOB SERPL: 1.3 G/DL
ALP SERPL-CCNC: 120 U/L (ref 39–117)
ALT SERPL W P-5'-P-CCNC: 36 U/L (ref 1–41)
ANION GAP SERPL CALCULATED.3IONS-SCNC: 11.8 MMOL/L (ref 5–15)
AST SERPL-CCNC: 26 U/L (ref 1–40)
BASOPHILS # BLD AUTO: 0.05 10*3/MM3 (ref 0–0.2)
BASOPHILS NFR BLD AUTO: 0.9 % (ref 0–1.5)
BILIRUB SERPL-MCNC: 0.4 MG/DL (ref 0–1.2)
BUN SERPL-MCNC: 25 MG/DL (ref 8–23)
BUN/CREAT SERPL: 16.4 (ref 7–25)
CALCIUM SPEC-SCNC: 9.7 MG/DL (ref 8.6–10.5)
CHLORIDE SERPL-SCNC: 104 MMOL/L (ref 98–107)
CHOLEST SERPL-MCNC: 152 MG/DL (ref 0–200)
CO2 SERPL-SCNC: 25.2 MMOL/L (ref 22–29)
CREAT SERPL-MCNC: 1.52 MG/DL (ref 0.76–1.27)
CREAT UR-MCNC: 145.9 MG/DL
DEPRECATED RDW RBC AUTO: 40 FL (ref 37–54)
EGFRCR SERPLBLD CKD-EPI 2021: 49.6 ML/MIN/1.73
EOSINOPHIL # BLD AUTO: 0.17 10*3/MM3 (ref 0–0.4)
EOSINOPHIL NFR BLD AUTO: 3.1 % (ref 0.3–6.2)
ERYTHROCYTE [DISTWIDTH] IN BLOOD BY AUTOMATED COUNT: 12.3 % (ref 12.3–15.4)
GLOBULIN UR ELPH-MCNC: 3.2 GM/DL
GLUCOSE SERPL-MCNC: 108 MG/DL (ref 65–99)
HBA1C MFR BLD: 6.1 % (ref 4.8–5.6)
HCT VFR BLD AUTO: 44.8 % (ref 37.5–51)
HDLC SERPL-MCNC: 41 MG/DL (ref 40–60)
HGB BLD-MCNC: 15 G/DL (ref 13–17.7)
IMM GRANULOCYTES # BLD AUTO: 0.03 10*3/MM3 (ref 0–0.05)
IMM GRANULOCYTES NFR BLD AUTO: 0.5 % (ref 0–0.5)
LDLC SERPL CALC-MCNC: 92 MG/DL (ref 0–100)
LDLC/HDLC SERPL: 2.21 {RATIO}
LYMPHOCYTES # BLD AUTO: 1.47 10*3/MM3 (ref 0.7–3.1)
LYMPHOCYTES NFR BLD AUTO: 26.9 % (ref 19.6–45.3)
MCH RBC QN AUTO: 29.8 PG (ref 26.6–33)
MCHC RBC AUTO-ENTMCNC: 33.5 G/DL (ref 31.5–35.7)
MCV RBC AUTO: 88.9 FL (ref 79–97)
MICROALBUMIN/CREAT UR: NORMAL MG/G{CREAT}
MONOCYTES # BLD AUTO: 0.59 10*3/MM3 (ref 0.1–0.9)
MONOCYTES NFR BLD AUTO: 10.8 % (ref 5–12)
NEUTROPHILS NFR BLD AUTO: 3.15 10*3/MM3 (ref 1.7–7)
NEUTROPHILS NFR BLD AUTO: 57.8 % (ref 42.7–76)
NRBC BLD AUTO-RTO: 0 /100 WBC (ref 0–0.2)
PLATELET # BLD AUTO: 234 10*3/MM3 (ref 140–450)
PMV BLD AUTO: 9.4 FL (ref 6–12)
POTASSIUM SERPL-SCNC: 3.9 MMOL/L (ref 3.5–5.2)
PROT SERPL-MCNC: 7.5 G/DL (ref 6–8.5)
PSA SERPL-MCNC: 3.23 NG/ML (ref 0–4)
RBC # BLD AUTO: 5.04 10*6/MM3 (ref 4.14–5.8)
SODIUM SERPL-SCNC: 141 MMOL/L (ref 136–145)
TRIGL SERPL-MCNC: 101 MG/DL (ref 0–150)
VLDLC SERPL-MCNC: 19 MG/DL (ref 5–40)
WBC NRBC COR # BLD: 5.46 10*3/MM3 (ref 3.4–10.8)

## 2023-05-03 PROCEDURE — 83036 HEMOGLOBIN GLYCOSYLATED A1C: CPT

## 2023-05-03 PROCEDURE — 80053 COMPREHEN METABOLIC PANEL: CPT

## 2023-05-03 PROCEDURE — 82043 UR ALBUMIN QUANTITATIVE: CPT

## 2023-05-03 PROCEDURE — 85025 COMPLETE CBC W/AUTO DIFF WBC: CPT

## 2023-05-03 PROCEDURE — 80061 LIPID PANEL: CPT

## 2023-05-03 PROCEDURE — 84153 ASSAY OF PSA TOTAL: CPT

## 2023-05-03 PROCEDURE — 82570 ASSAY OF URINE CREATININE: CPT

## 2023-05-08 RX ORDER — POTASSIUM CHLORIDE 750 MG/1
TABLET, FILM COATED, EXTENDED RELEASE ORAL
Qty: 180 TABLET | Refills: 1 | Status: SHIPPED | OUTPATIENT
Start: 2023-05-08

## 2023-05-25 ENCOUNTER — OFFICE VISIT (OUTPATIENT)
Dept: INTERNAL MEDICINE | Facility: CLINIC | Age: 68
End: 2023-05-25
Payer: MEDICARE

## 2023-05-25 VITALS
HEART RATE: 84 BPM | DIASTOLIC BLOOD PRESSURE: 78 MMHG | HEIGHT: 74 IN | TEMPERATURE: 98.2 F | BODY MASS INDEX: 28.39 KG/M2 | WEIGHT: 221.2 LBS | SYSTOLIC BLOOD PRESSURE: 148 MMHG

## 2023-05-25 DIAGNOSIS — R05.1 ACUTE COUGH: ICD-10-CM

## 2023-05-25 DIAGNOSIS — J30.1 SEASONAL ALLERGIC RHINITIS DUE TO POLLEN: Primary | ICD-10-CM

## 2023-05-25 LAB
EXPIRATION DATE: NORMAL
FLUAV AG UPPER RESP QL IA.RAPID: NOT DETECTED
FLUBV AG UPPER RESP QL IA.RAPID: NOT DETECTED
INTERNAL CONTROL: NORMAL
Lab: NORMAL
SARS-COV-2 AG UPPER RESP QL IA.RAPID: NOT DETECTED

## 2023-05-25 RX ORDER — TRIAMCINOLONE ACETONIDE 40 MG/ML
80 INJECTION, SUSPENSION INTRA-ARTICULAR; INTRAMUSCULAR ONCE
Status: COMPLETED | OUTPATIENT
Start: 2023-05-25 | End: 2023-05-25

## 2023-05-25 RX ADMIN — TRIAMCINOLONE ACETONIDE 80 MG: 40 INJECTION, SUSPENSION INTRA-ARTICULAR; INTRAMUSCULAR at 15:46

## 2023-05-25 NOTE — PROGRESS NOTES
"[facility]     Donta Ledesma Jr.  1955    [id number]      Patient Care Team:  Josh Ortiz MD as PCP - General (Internal Medicine)  Shankar Woodward MD as Consulting Physician (Urology)  Ryan Lopez MD as Consulting Physician (Gastroenterology)  Darryl Hayes MD as Consulting Physician (Ophthalmology)  Josh Ortiz MD as Referring Physician (Internal Medicine)    Chief Complaint::   Chief Complaint   Patient presents with   • URI     congestion   • Shortness of Breath   • Cough     Onset 2 weeks ago        HPI    The patient presents today complaining of cough and dyspnea.    Cough and dyspnea  The patient reports nasal congestion and a cough caused by postnasal drainage. He contracted a \"head cold\" approximately 2.5 weeks ago. He denies fever and body aches. The patient is able to sleep, eat, and exercise as usual. He is leaving on vacation on 05/26/2023. He felt improved during the daytime on 05/24/2023 while visiting [place]; however, when he returned home in the evening and was exposed to flowers, his cough recurred. The patient notes that he has never undergone allergy testing.    Chronic Conditions:      Patient Active Problem List   Diagnosis   • Irritable bowel syndrome   • Annual physical exam   • Hypercholesterolemia   • Primary hypertension   • Chronic kidney disease, stage III (moderate)   • Sleep apnea   • Disc disorder of cervical region   • Prediabetes   • Major depressive disorder with single episode, in full remission        Past Medical History:   Diagnosis Date   • Back problem    • Benign prostate hyperplasia    • Glaucoma    • Hypertension    • Nephrolithiasis        Past Surgical History:   Procedure Laterality Date   • WISDOM TOOTH EXTRACTION         Family History   Problem Relation Age of Onset   • Hypertension Mother    • Cancer Mother    • Diabetes Father    • Cancer Father    • Hypertension Brother    • Cancer Maternal Grandfather " Jason Hagan LPN  [facility] Staff  Mr. hSafer has a colonoscopy scheduled with Dr. Ferro on 11/28/22. Please send clearance to hold Plavix and Pletal for 5 days?       If you have any questions or concerns, please don't hesitate to call.     Sincerely,   JENNIFER Gomez   [phone]          Pt last ov 7/5/22   "   • Coronary artery disease Paternal Grandmother    • Stroke Paternal Grandfather        Social History     Socioeconomic History   • Marital status:    Tobacco Use   • Smoking status: Never     Passive exposure: Never   • Smokeless tobacco: Never   Vaping Use   • Vaping Use: Never used   Substance and Sexual Activity   • Alcohol use: Never   • Drug use: Never   • Sexual activity: Defer       Allergies   Allergen Reactions   • Clarithromycin Other (See Comments)     Unknown;  BIAXIN         Current Outpatient Medications:   •  amLODIPine (NORVASC) 10 MG tablet, Take 1 tablet by mouth Daily., Disp: 90 tablet, Rfl: 3  •  atorvastatin (LIPITOR) 20 MG tablet, Take 1 tablet by mouth Daily., Disp: 90 tablet, Rfl: 3  •  buPROPion XL (WELLBUTRIN XL) 300 MG 24 hr tablet, Take 1 tablet by mouth Daily., Disp: 90 tablet, Rfl: 3  •  Cholecalciferol (Vitamin D3) 50 MCG (2000 UT) tablet, Take 1 tablet by mouth 4 (Four) Times a Week., Disp: , Rfl:   •  doxazosin (CARDURA) 4 MG tablet, Take 1 tablet by mouth Every Night., Disp: 90 tablet, Rfl: 3  •  hydroCHLOROthiazide (HYDRODIURIL) 12.5 MG tablet, Take 1 tablet by mouth Daily., Disp: 90 tablet, Rfl: 3  •  potassium chloride 10 MEQ CR tablet, TAKE 1 TABLET BY MOUTH TWICE DAILY, Disp: 180 tablet, Rfl: 1  •  sertraline (ZOLOFT) 25 MG tablet, TAKE 1 TABLET BY MOUTH DAILY, Disp: 90 tablet, Rfl: 3    Review of Systems     A review of systems was performed, and positive findings are noted in the HPI.    Vital Signs  Vitals:    05/25/23 1450   BP: 148/78   BP Location: Left arm   Patient Position: Sitting   Cuff Size: Adult   Pulse: 84   Temp: 98.2 °F (36.8 °C)   Weight: 100 kg (221 lb 3.2 oz)   Height: 188 cm (74.02\")   PainSc: 0-No pain       Physical Exam  Vitals reviewed.   Constitutional:       Appearance: He is well-developed.   HENT:      Head: Normocephalic and atraumatic.      Mouth/Throat:      Comments: He has mild posterior pharyngeal erythema.  Eyes:      Comments: He " has mild conjunctivitis bilaterally without drainage.   Cardiovascular:      Rate and Rhythm: Normal rate and regular rhythm.      Heart sounds: Normal heart sounds. No murmur heard.  Pulmonary:      Effort: Pulmonary effort is normal.      Breath sounds: Normal breath sounds.   Neurological:      Mental Status: He is alert and oriented to person, place, and time.        Results    His COVID-19 test was negative today.    Procedures    ACE III MINI             Assessment/Plan:    Diagnoses and all orders for this visit:    1. Seasonal allergic rhinitis due to pollen (Primary)  -     triamcinolone acetonide (KENALOG-40) injection 80 mg    2. Acute cough  -     POCT SARS-CoV-2 Antigen KATARINA    1. Allergic rhinitis  - His symptoms are possibly due to plants in his house. Parenteral triamcinolone was administered. If symptoms persist, he will use Zyrtec.  - He will follow up as previously scheduled.      Plan of care reviewed with patient at the conclusion of today's visit. Education was provided regarding diagnosis, management, and any prescribed or recommended OTC medications.Patient verbalizes understanding of and agreement with management plan.         Josh Ortiz MD       Transcribed from ambient dictation for Josh Ortiz MD by Deanna Tobin.  05/25/23   20:26 EDT    Patient or patient representative verbalized consent to the visit recording.  I have personally performed the services described in this document as transcribed by the above individual, and it is both accurate and complete.

## 2023-08-07 RX ORDER — BUPROPION HYDROCHLORIDE 300 MG/1
300 TABLET ORAL DAILY
Qty: 90 TABLET | Refills: 3 | Status: SHIPPED | OUTPATIENT
Start: 2023-08-07

## 2023-11-02 RX ORDER — POTASSIUM CHLORIDE 750 MG/1
TABLET, FILM COATED, EXTENDED RELEASE ORAL
Qty: 180 TABLET | Refills: 1 | Status: SHIPPED | OUTPATIENT
Start: 2023-11-02

## 2024-01-08 RX ORDER — HYDROCHLOROTHIAZIDE 12.5 MG/1
12.5 TABLET ORAL DAILY
Qty: 90 TABLET | Refills: 3 | Status: SHIPPED | OUTPATIENT
Start: 2024-01-08

## 2024-01-15 ENCOUNTER — OFFICE VISIT (OUTPATIENT)
Dept: INTERNAL MEDICINE | Facility: CLINIC | Age: 69
End: 2024-01-15
Payer: MEDICARE

## 2024-01-15 VITALS
DIASTOLIC BLOOD PRESSURE: 62 MMHG | TEMPERATURE: 98.3 F | BODY MASS INDEX: 29.31 KG/M2 | HEIGHT: 74 IN | OXYGEN SATURATION: 97 % | SYSTOLIC BLOOD PRESSURE: 122 MMHG | HEART RATE: 101 BPM | WEIGHT: 228.4 LBS

## 2024-01-15 DIAGNOSIS — I10 PRIMARY HYPERTENSION: Primary | ICD-10-CM

## 2024-01-15 DIAGNOSIS — N18.32 STAGE 3B CHRONIC KIDNEY DISEASE: ICD-10-CM

## 2024-01-15 DIAGNOSIS — R73.03 PREDIABETES: ICD-10-CM

## 2024-01-15 DIAGNOSIS — E78.00 HYPERCHOLESTEROLEMIA: ICD-10-CM

## 2024-01-15 PROCEDURE — 3074F SYST BP LT 130 MM HG: CPT | Performed by: INTERNAL MEDICINE

## 2024-01-15 PROCEDURE — 1159F MED LIST DOCD IN RCRD: CPT | Performed by: INTERNAL MEDICINE

## 2024-01-15 PROCEDURE — 3078F DIAST BP <80 MM HG: CPT | Performed by: INTERNAL MEDICINE

## 2024-01-15 PROCEDURE — 99214 OFFICE O/P EST MOD 30 MIN: CPT | Performed by: INTERNAL MEDICINE

## 2024-01-15 PROCEDURE — 1160F RVW MEDS BY RX/DR IN RCRD: CPT | Performed by: INTERNAL MEDICINE

## 2024-01-15 NOTE — PROGRESS NOTES
"[facility]     Donta Ledesma Jr.  1955   [id number]      Patient Care Team:  Josh Ortiz MD as PCP - General (Internal Medicine)  Shankar Woodward MD as Consulting Physician (Urology)  Ryan Lopez MD as Consulting Physician (Gastroenterology)  Darryl Hayes MD as Consulting Physician (Ophthalmology)  Josh Ortiz MD as Referring Physician (Internal Medicine)    Chief Complaint::   Chief Complaint   Patient presents with    Hypertension    Hyperlipidemia        HPI  Donta Ledesma is a 68-year-old male who presents today for a follow-up of hypertension, hyperlipidemia, prediabetes, and chronic kidney disease.    Weight gain  The patient states that he has gained 13 pounds since his last visit.    Back pain  The patient states that he injured his back in late 10/2023 when he caught his grandson in the air. He initially had difficulty walking was unable to walk his 3 to 4 miles daily or play golf as he typically did; however, he was able to resume normal activity after working with a \"therapist.\"     Chronic kidney disease  The patient reports only drinking enough water this morning to take Zyrtec. He is conscious about drinking watrer during the day. He inquires about his kidney function. He plans to return on 01/16/2024 or blood work.    Chronic Conditions:  Hypertension, hyperlipidemia, prediabetes, and chronic kidney disease.      Patient Active Problem List   Diagnosis    Irritable bowel syndrome    Annual physical exam    Hypercholesterolemia    Primary hypertension    Chronic kidney disease, stage III (moderate)    Sleep apnea    Disc disorder of cervical region    Prediabetes    Major depressive disorder with single episode, in full remission        Past Medical History:   Diagnosis Date    Back problem     Benign prostate hyperplasia     Glaucoma     Hypertension     Nephrolithiasis     Renal insufficiency        Past Surgical History:   Procedure " Laterality Date    COLONOSCOPY      Multiple times    EYE SURGERY      Lasix    WISDOM TOOTH EXTRACTION         Family History   Problem Relation Age of Onset    Hypertension Mother     Cancer Mother     Diabetes Father     Cancer Father     Hypertension Brother     Cancer Maternal Grandfather     Coronary artery disease Paternal Grandmother     Stroke Paternal Grandfather        Social History     Socioeconomic History    Marital status:    Tobacco Use    Smoking status: Never     Passive exposure: Never    Smokeless tobacco: Never   Vaping Use    Vaping Use: Never used   Substance and Sexual Activity    Alcohol use: Never    Drug use: Never    Sexual activity: Not Currently     Partners: Female       Allergies   Allergen Reactions    Clarithromycin Other (See Comments)     Unknown;  BIAXIN         Current Outpatient Medications:     amLODIPine (NORVASC) 10 MG tablet, TAKE 1 TABLET BY MOUTH DAILY, Disp: 90 tablet, Rfl: 3    atorvastatin (LIPITOR) 20 MG tablet, Take 1 tablet by mouth Daily., Disp: 90 tablet, Rfl: 3    buPROPion XL (WELLBUTRIN XL) 300 MG 24 hr tablet, TAKE 1 TABLET BY MOUTH DAILY, Disp: 90 tablet, Rfl: 3    Cholecalciferol (Vitamin D3) 50 MCG (2000 UT) tablet, Take 1 tablet by mouth 4 (Four) Times a Week., Disp: , Rfl:     doxazosin (CARDURA) 4 MG tablet, Take 1 tablet by mouth Every Night., Disp: 90 tablet, Rfl: 3    hydroCHLOROthiazide (HYDRODIURIL) 12.5 MG tablet, TAKE 1 TABLET BY MOUTH DAILY, Disp: 90 tablet, Rfl: 3    potassium chloride 10 MEQ CR tablet, TAKE 1 TABLET BY MOUTH TWICE DAILY, Disp: 180 tablet, Rfl: 1    sertraline (ZOLOFT) 25 MG tablet, TAKE 1 TABLET BY MOUTH DAILY, Disp: 90 tablet, Rfl: 3    Review of Systems   Constitutional:  Negative for chills, fatigue and fever.   HENT:  Negative for congestion, ear pain and sinus pressure.    Respiratory:  Negative for cough, chest tightness, shortness of breath and wheezing.    Cardiovascular:  Negative for chest pain and  "palpitations.   Gastrointestinal:  Negative for abdominal pain, blood in stool and constipation.   Skin:  Negative for color change.   Allergic/Immunologic: Negative for environmental allergies.   Neurological:  Negative for dizziness, speech difficulty and headache.   Psychiatric/Behavioral:  Negative for decreased concentration. The patient is not nervous/anxious.         Vital Signs  Vitals:    01/15/24 0814   BP: 122/62   BP Location: Right arm   Patient Position: Sitting   Cuff Size: Adult   Pulse: 101   Temp: 98.3 °F (36.8 °C)   TempSrc: Infrared   SpO2: 97%   Weight: 104 kg (228 lb 6.4 oz)   Height: 187 cm (73.62\")   PainSc: 0-No pain       Physical Exam  Vitals reviewed.   Constitutional:       Appearance: He is well-developed.   HENT:      Head: Normocephalic and atraumatic.   Neck:      Thyroid: No thyromegaly.      Vascular: No carotid bruit.   Cardiovascular:      Rate and Rhythm: Normal rate and regular rhythm.      Heart sounds: Normal heart sounds. No murmur heard.     No friction rub. No gallop.   Pulmonary:      Effort: Pulmonary effort is normal.      Breath sounds: Normal breath sounds.   Musculoskeletal:      Cervical back: Neck supple.          Procedures    ACE III MINI             Assessment/Plan:    Diagnoses and all orders for this visit:    1. Primary hypertension (Primary)    2. Prediabetes  -     Hemoglobin A1c; Future    3. Hypercholesterolemia  -     Comprehensive Metabolic Panel; Future  -     Lipid Panel; Future    4. Stage 3b chronic kidney disease        1. Hypertension.  - Blood pressure is well controlled on hydrochlorothiazide, doxazosin, and amlodipine.    2. Prediabetes.  - Hemoglobin A1c is pending. The treatment remains a healthy diet. He has gained weight over the past few months and the treatment is to get his weight back down.    3. Hyperlipidemia.  - Continue atorvastatin. See dietary instructions above.    4. Chronic Kidney Disease.  - We provided clarification and " shared information regarding the concept that his kidneys are functioning effectively but lakes reserve capacity. GFR is pending. He is to get this done when he is well hydrated. He will follow up on 04/2024 with nephrology as scheduled.     Follow up with me in 6 months.    Plan of care reviewed with patient at the conclusion of today's visit. Education was provided regarding diagnosis, management, and any prescribed or recommended OTC medications.Patient verbalizes understanding of and agreement with management plan.         Josh Ortiz MD       Transcribed from ambient dictation for Josh Ortiz MD by Gia Diop.  01/15/24   10:42 EST    Patient or patient representative verbalized consent to the visit recording.  I have personally performed the services described in this document as transcribed by the above individual, and it is both accurate and complete.

## 2024-01-17 ENCOUNTER — LAB (OUTPATIENT)
Dept: LAB | Facility: HOSPITAL | Age: 69
End: 2024-01-17
Payer: MEDICARE

## 2024-01-17 DIAGNOSIS — R73.03 PREDIABETES: ICD-10-CM

## 2024-01-17 DIAGNOSIS — E78.00 HYPERCHOLESTEROLEMIA: ICD-10-CM

## 2024-01-17 LAB
ALBUMIN SERPL-MCNC: 4.1 G/DL (ref 3.5–5.2)
ALBUMIN/GLOB SERPL: 1.3 G/DL
ALP SERPL-CCNC: 126 U/L (ref 39–117)
ALT SERPL W P-5'-P-CCNC: 36 U/L (ref 1–41)
ANION GAP SERPL CALCULATED.3IONS-SCNC: 11 MMOL/L (ref 5–15)
AST SERPL-CCNC: 26 U/L (ref 1–40)
BILIRUB SERPL-MCNC: 0.7 MG/DL (ref 0–1.2)
BUN SERPL-MCNC: 24 MG/DL (ref 8–23)
BUN/CREAT SERPL: 14.9 (ref 7–25)
CALCIUM SPEC-SCNC: 9.5 MG/DL (ref 8.6–10.5)
CHLORIDE SERPL-SCNC: 101 MMOL/L (ref 98–107)
CHOLEST SERPL-MCNC: 177 MG/DL (ref 0–200)
CO2 SERPL-SCNC: 27 MMOL/L (ref 22–29)
CREAT SERPL-MCNC: 1.61 MG/DL (ref 0.76–1.27)
EGFRCR SERPLBLD CKD-EPI 2021: 46 ML/MIN/1.73
GLOBULIN UR ELPH-MCNC: 3.1 GM/DL
GLUCOSE SERPL-MCNC: 85 MG/DL (ref 65–99)
HBA1C MFR BLD: 5.7 % (ref 4.8–5.6)
HDLC SERPL-MCNC: 41 MG/DL (ref 40–60)
LDLC SERPL CALC-MCNC: 103 MG/DL (ref 0–100)
LDLC/HDLC SERPL: 2.4 {RATIO}
POTASSIUM SERPL-SCNC: 3.8 MMOL/L (ref 3.5–5.2)
PROT SERPL-MCNC: 7.2 G/DL (ref 6–8.5)
SODIUM SERPL-SCNC: 139 MMOL/L (ref 136–145)
TRIGL SERPL-MCNC: 187 MG/DL (ref 0–150)
VLDLC SERPL-MCNC: 33 MG/DL (ref 5–40)

## 2024-01-17 PROCEDURE — 80053 COMPREHEN METABOLIC PANEL: CPT

## 2024-01-17 PROCEDURE — 80061 LIPID PANEL: CPT

## 2024-01-17 PROCEDURE — 83036 HEMOGLOBIN GLYCOSYLATED A1C: CPT

## 2024-01-24 ENCOUNTER — OFFICE VISIT (OUTPATIENT)
Dept: SLEEP MEDICINE | Facility: HOSPITAL | Age: 69
End: 2024-01-24
Payer: MEDICARE

## 2024-01-24 VITALS
WEIGHT: 233.6 LBS | HEART RATE: 72 BPM | DIASTOLIC BLOOD PRESSURE: 71 MMHG | BODY MASS INDEX: 29.98 KG/M2 | SYSTOLIC BLOOD PRESSURE: 142 MMHG | OXYGEN SATURATION: 95 % | HEIGHT: 74 IN

## 2024-01-24 DIAGNOSIS — G47.33 OSA (OBSTRUCTIVE SLEEP APNEA): Primary | ICD-10-CM

## 2024-01-24 DIAGNOSIS — E66.3 OVERWEIGHT (BMI 25.0-29.9): ICD-10-CM

## 2024-01-24 PROCEDURE — 1160F RVW MEDS BY RX/DR IN RCRD: CPT | Performed by: NURSE PRACTITIONER

## 2024-01-24 PROCEDURE — 3077F SYST BP >= 140 MM HG: CPT | Performed by: NURSE PRACTITIONER

## 2024-01-24 PROCEDURE — 1159F MED LIST DOCD IN RCRD: CPT | Performed by: NURSE PRACTITIONER

## 2024-01-24 PROCEDURE — 99213 OFFICE O/P EST LOW 20 MIN: CPT | Performed by: NURSE PRACTITIONER

## 2024-01-24 PROCEDURE — 3078F DIAST BP <80 MM HG: CPT | Performed by: NURSE PRACTITIONER

## 2024-01-24 NOTE — PROGRESS NOTES
Sleep Clinic Follow Up Note    Chief Complaint  Follow-up    Subjective     History of Present Illness (from previous encounter on 1/16/2023):  Donta Ledesma Jr. is a 67 y.o. male who presents to establish care for obstructive sleep apnea on PAP therapy.  Patient had previously been followed by Dr. Donato who has since retired. He just received his new mcnamara recall device about a month ago. He is in full compliance with greater than 4 hours usage at 98.9%, and AHI of 1.9.  I will refill the patient's supplies today and he will return for follow-up and compliance in 1 year or sooner should he have further questions or concerns.  I have discussed weight loss as it pertains to obstructive sleep apnea. (End copied text).    (End copied text).    Interval History:  Donta Ledesma Jr. is a 68 y.o. male returns for follow up and compliance of PAP therapy. The patient was last seen on 1/16/2023. Overall the patient feels good with regard to therapy. The device appears to be working appropriately. On average the patient sleeps 8 hours per night. The patient wakes 1-2 times per night. He has a new device from the recall.    The patient reports the following changes to their medical and medication history since they were last seen:  None       Further details are as follows:      Camden Scale is (out of 24): Total score: 2     Weight:  Current Weight: 233 lb      The patient's relevant past medical, surgical, family, and social history reviewed and updated in Epic as appropriate.    PMH:    Past Medical History:   Diagnosis Date    Back problem     Benign prostate hyperplasia     Glaucoma     Hypertension     Nephrolithiasis     Renal insufficiency      Past Surgical History:   Procedure Laterality Date    COLONOSCOPY      Multiple times    EYE SURGERY      Lasix    WISDOM TOOTH EXTRACTION         Allergies   Allergen Reactions    Erythromycin Unknown - Low Severity    Clarithromycin Other (See Comments)      "Unknown;  BIAXIN       MEDS:  Prior to Admission medications    Medication Sig Start Date End Date Taking? Authorizing Provider   amLODIPine (NORVASC) 10 MG tablet TAKE 1 TABLET BY MOUTH DAILY 7/20/23   Josh Ortiz MD   atorvastatin (LIPITOR) 20 MG tablet Take 1 tablet by mouth Daily. 3/7/23   Josh Ortiz MD   buPROPion XL (WELLBUTRIN XL) 300 MG 24 hr tablet TAKE 1 TABLET BY MOUTH DAILY 8/7/23   Josh Ortiz MD   Cholecalciferol (Vitamin D3) 50 MCG (2000 UT) tablet Take 1 tablet by mouth 4 (Four) Times a Week.    Provider, MD Alberto   doxazosin (CARDURA) 4 MG tablet Take 1 tablet by mouth Every Night. 7/10/23   Josh Ortiz MD   hydroCHLOROthiazide (HYDRODIURIL) 12.5 MG tablet TAKE 1 TABLET BY MOUTH DAILY 1/8/24   Josh Ortiz MD   potassium chloride 10 MEQ CR tablet TAKE 1 TABLET BY MOUTH TWICE DAILY 11/2/23   Josh Ortiz MD   sertraline (ZOLOFT) 25 MG tablet TAKE 1 TABLET BY MOUTH DAILY 4/11/23   Josh Ortiz MD         FH:  Family History   Problem Relation Age of Onset    Hypertension Mother     Cancer Mother     Diabetes Father     Cancer Father     Hypertension Brother     Cancer Maternal Grandfather     Coronary artery disease Paternal Grandmother     Stroke Paternal Grandfather        Objective   Vital Signs:  /71 (BP Location: Right arm, Patient Position: Sitting, Cuff Size: Adult)   Pulse 72   Ht 188 cm (74\")   Wt 106 kg (233 lb 9.6 oz)   SpO2 95%   BMI 29.99 kg/m²           Physical Exam  Vitals reviewed.   Constitutional:       Appearance: Normal appearance.   HENT:      Head: Normocephalic and atraumatic.      Nose: Nose normal.      Mouth/Throat:      Mouth: Mucous membranes are moist.   Cardiovascular:      Rate and Rhythm: Normal rate and regular rhythm.      Heart sounds: No murmur heard.     No friction rub. No gallop.   Pulmonary:      Effort: Pulmonary effort is normal. No respiratory distress.      Breath " sounds: Normal breath sounds. No wheezing or rhonchi.   Neurological:      Mental Status: He is alert and oriented to person, place, and time.   Psychiatric:         Behavior: Behavior normal.               Result Review :           PAP Report:  AHI: 2.9/h  Days of Usage: 98.9%  Number of Days Greater than 4 hours: 98.9%  Average time (days used): 7 hours 52 minutes  90th Percentile Pressure: 9.5 cm H2O  Settings: Auto CPAP-4/20 cm H2O, a flex-2, ramp time 30 minutes, ramp start pressure 4 cm H2O.       Assessment and Plan  Donta Ledesma Jr. is a 68 y.o. male who returns for follow-up and compliance of PAP therapy.  The Pap report has been reviewed.  Overall usage and compliance are at 98.9%.  Patient averages 7 hours 52 minutes.  Sleep apnea is well-controlled with an AHI of 2.9/H. I will refill the patient's supplies, and I have asked them to return for follow-up and compliance in 1 year or sooner should they have further questions or concerns.    Diagnoses and all orders for this visit:    1. CRISTINE (obstructive sleep apnea) (Primary)  -     PAP Therapy    2. Overweight (BMI 25.0-29.9)             The patient continues to use and benefit from PAP therapy.    1. The patient was counseled regarding multimodal approach with healthy nutrition, healthy sleep, regular physical activity, social activities, counseling, and medications. Encouraged to practice lateral sleep position. Avoid alcohol and sedatives close to bedtime.     2.  We will refill supplies x1 year.  Return to clinic 1 year or sooner if symptoms warrant. I have reviewed the results of my evaluation and impression and discussed my recommendations in detail with the patient.           Follow Up  Return in about 1 year (around 1/24/2025) for Annual visit.  Patient was given instructions and counseling regarding his condition or for health maintenance advice. Please see specific information pulled into the AVS if appropriate.       PERFECTO Sauceda,  ACNP-BC  Pulmonology, Critical Care, and Sleep Medicine

## 2024-02-26 RX ORDER — ATORVASTATIN CALCIUM 20 MG/1
20 TABLET, FILM COATED ORAL DAILY
Qty: 90 TABLET | Refills: 3 | Status: SHIPPED | OUTPATIENT
Start: 2024-02-26

## 2024-04-25 RX ORDER — POTASSIUM CHLORIDE 750 MG/1
TABLET, FILM COATED, EXTENDED RELEASE ORAL
Qty: 180 TABLET | Refills: 1 | Status: SHIPPED | OUTPATIENT
Start: 2024-04-25

## 2024-05-17 ENCOUNTER — TELEPHONE (OUTPATIENT)
Dept: INTERNAL MEDICINE | Facility: CLINIC | Age: 69
End: 2024-05-17

## 2024-05-17 NOTE — TELEPHONE ENCOUNTER
"  Caller: Donta Ledesma Jr. \"Ed\"    Relationship: Self    Best call back number: [phone]     What is the best time to reach you: ANY    Who are you requesting to speak with (clinical staff, provider,  specific staff member): DR. KWONG OR HIS NURSE    What was the call regarding: THE PATIENT IS NEEDING TO KNOW WHEN HE WAS LAST VACCINATED FOR THE WHOOPING COUGH. HIS DAUGHTER WHO IS A NURSE SAID THAT IT IS A COMBINATION VACCINE. SHE IS WORRIED WITH WHOPPING COUGH GOING AROUND THAT HE IS DUE FOR THIS IMMUNIZATION TO BE UPDATED. PLEASE CALL HIM BACK AND LET HIM KNOW WHEN THE LAST WAS AND WHEN IT IS DUE.     Is it okay if the provider responds through [facility]: NO  "

## 2024-06-27 RX ORDER — DOXAZOSIN MESYLATE 4 MG/1
4 TABLET ORAL NIGHTLY
Qty: 90 TABLET | Refills: 3 | Status: SHIPPED | OUTPATIENT
Start: 2024-06-27

## 2024-06-27 RX ORDER — SERTRALINE HYDROCHLORIDE 25 MG/1
25 TABLET, FILM COATED ORAL DAILY
Qty: 90 TABLET | Refills: 3 | Status: SHIPPED | OUTPATIENT
Start: 2024-06-27

## 2024-06-27 NOTE — TELEPHONE ENCOUNTER
Rx Refill Note  Requested Prescriptions     Pending Prescriptions Disp Refills    doxazosin (CARDURA) 4 MG tablet [Pharmacy Med Name: DOXAZOSIN 4MG TABLETS] 90 tablet 3     Sig: TAKE 1 TABLET BY MOUTH EVERY NIGHT    sertraline (ZOLOFT) 25 MG tablet [Pharmacy Med Name: SERTRALINE 25MG TABLETS] 90 tablet 3     Sig: TAKE 1 TABLET BY MOUTH DAILY      Last office visit with prescribing clinician: 1/15/2024   Last telemedicine visit with prescribing clinician: Visit date not found   Next office visit with prescribing clinician: 7/12/2024                         Would you like a call back once the refill request has been completed: [] Yes [] No    If the office needs to give you a call back, can they leave a voicemail: [] Yes [] No    Penelope Pan MA  06/27/24, 08:53 EDT

## 2024-07-05 ENCOUNTER — APPOINTMENT (OUTPATIENT)
Dept: CT IMAGING | Facility: HOSPITAL | Age: 69
End: 2024-07-05
Payer: MEDICARE

## 2024-07-05 ENCOUNTER — HOSPITAL ENCOUNTER (EMERGENCY)
Facility: HOSPITAL | Age: 69
Discharge: HOME | End: 2024-07-06
Attending: EMERGENCY MEDICINE
Payer: MEDICARE

## 2024-07-05 DIAGNOSIS — J18.9 LINGULAR PNEUMONIA: Primary | ICD-10-CM

## 2024-07-05 DIAGNOSIS — N28.1 RENAL CYST, RIGHT: ICD-10-CM

## 2024-07-05 DIAGNOSIS — R31.29 OTHER MICROSCOPIC HEMATURIA: ICD-10-CM

## 2024-07-05 LAB
ALBUMIN SERPL-MCNC: 4 G/DL (ref 3.5–5.2)
ALBUMIN/GLOB SERPL: 1.1 G/DL
ALP SERPL-CCNC: 142 U/L (ref 39–117)
ALT SERPL W P-5'-P-CCNC: 38 U/L (ref 1–41)
ANION GAP SERPL CALCULATED.3IONS-SCNC: 13 MMOL/L (ref 5–15)
AST SERPL-CCNC: 30 U/L (ref 1–40)
B PARAPERT DNA SPEC QL NAA+PROBE: NOT DETECTED
B PERT DNA SPEC QL NAA+PROBE: NOT DETECTED
BASOPHILS # BLD AUTO: 0.04 10*3/MM3 (ref 0–0.2)
BASOPHILS NFR BLD AUTO: 0.4 % (ref 0–1.5)
BILIRUB SERPL-MCNC: 0.9 MG/DL (ref 0–1.2)
BUN SERPL-MCNC: 16 MG/DL (ref 8–23)
BUN/CREAT SERPL: 9.9 (ref 7–25)
C PNEUM DNA NPH QL NAA+NON-PROBE: NOT DETECTED
CALCIUM SPEC-SCNC: 9.3 MG/DL (ref 8.6–10.5)
CHLORIDE SERPL-SCNC: 100 MMOL/L (ref 98–107)
CO2 SERPL-SCNC: 23 MMOL/L (ref 22–29)
CREAT SERPL-MCNC: 1.61 MG/DL (ref 0.76–1.27)
D-LACTATE SERPL-SCNC: 1.2 MMOL/L (ref 0.5–2)
DEPRECATED RDW RBC AUTO: 41.5 FL (ref 37–54)
EGFRCR SERPLBLD CKD-EPI 2021: 46 ML/MIN/1.73
EOSINOPHIL # BLD AUTO: 0.05 10*3/MM3 (ref 0–0.4)
EOSINOPHIL NFR BLD AUTO: 0.6 % (ref 0.3–6.2)
ERYTHROCYTE [DISTWIDTH] IN BLOOD BY AUTOMATED COUNT: 13.1 % (ref 12.3–15.4)
FLUAV SUBTYP SPEC NAA+PROBE: NOT DETECTED
FLUBV RNA ISLT QL NAA+PROBE: NOT DETECTED
GLOBULIN UR ELPH-MCNC: 3.8 GM/DL
GLUCOSE SERPL-MCNC: 115 MG/DL (ref 65–99)
HADV DNA SPEC NAA+PROBE: NOT DETECTED
HCOV 229E RNA SPEC QL NAA+PROBE: NOT DETECTED
HCOV HKU1 RNA SPEC QL NAA+PROBE: NOT DETECTED
HCOV NL63 RNA SPEC QL NAA+PROBE: NOT DETECTED
HCOV OC43 RNA SPEC QL NAA+PROBE: NOT DETECTED
HCT VFR BLD AUTO: 43 % (ref 37.5–51)
HGB BLD-MCNC: 14.9 G/DL (ref 13–17.7)
HMPV RNA NPH QL NAA+NON-PROBE: NOT DETECTED
HOLD SPECIMEN: NORMAL
HPIV1 RNA ISLT QL NAA+PROBE: NOT DETECTED
HPIV2 RNA SPEC QL NAA+PROBE: NOT DETECTED
HPIV3 RNA NPH QL NAA+PROBE: NOT DETECTED
HPIV4 P GENE NPH QL NAA+PROBE: NOT DETECTED
IMM GRANULOCYTES # BLD AUTO: 0.01 10*3/MM3 (ref 0–0.05)
IMM GRANULOCYTES NFR BLD AUTO: 0.1 % (ref 0–0.5)
LIPASE SERPL-CCNC: 23 U/L (ref 13–60)
LYMPHOCYTES # BLD AUTO: 0.92 10*3/MM3 (ref 0.7–3.1)
LYMPHOCYTES NFR BLD AUTO: 10.2 % (ref 19.6–45.3)
M PNEUMO IGG SER IA-ACNC: NOT DETECTED
MCH RBC QN AUTO: 30.3 PG (ref 26.6–33)
MCHC RBC AUTO-ENTMCNC: 34.7 G/DL (ref 31.5–35.7)
MCV RBC AUTO: 87.4 FL (ref 79–97)
MONOCYTES # BLD AUTO: 1 10*3/MM3 (ref 0.1–0.9)
MONOCYTES NFR BLD AUTO: 11.1 % (ref 5–12)
NEUTROPHILS NFR BLD AUTO: 7.02 10*3/MM3 (ref 1.7–7)
NEUTROPHILS NFR BLD AUTO: 77.6 % (ref 42.7–76)
NRBC BLD AUTO-RTO: 0 /100 WBC (ref 0–0.2)
NT-PROBNP SERPL-MCNC: 496.9 PG/ML (ref 0–900)
PLATELET # BLD AUTO: 203 10*3/MM3 (ref 140–450)
PMV BLD AUTO: 8.9 FL (ref 6–12)
POTASSIUM SERPL-SCNC: 3.3 MMOL/L (ref 3.5–5.2)
PROCALCITONIN SERPL-MCNC: 0.25 NG/ML (ref 0–0.25)
PROT SERPL-MCNC: 7.8 G/DL (ref 6–8.5)
RBC # BLD AUTO: 4.92 10*6/MM3 (ref 4.14–5.8)
RHINOVIRUS RNA SPEC NAA+PROBE: NOT DETECTED
RSV RNA NPH QL NAA+NON-PROBE: NOT DETECTED
SARS-COV-2 RNA NPH QL NAA+NON-PROBE: NOT DETECTED
SODIUM SERPL-SCNC: 136 MMOL/L (ref 136–145)
TROPONIN T SERPL HS-MCNC: 18 NG/L
WBC NRBC COR # BLD AUTO: 9.04 10*3/MM3 (ref 3.4–10.8)
WHOLE BLOOD HOLD COAG: NORMAL
WHOLE BLOOD HOLD SPECIMEN: NORMAL

## 2024-07-05 PROCEDURE — 25810000003 SODIUM CHLORIDE 0.9 % SOLUTION

## 2024-07-05 PROCEDURE — 0202U NFCT DS 22 TRGT SARS-COV-2: CPT

## 2024-07-05 PROCEDURE — 83880 ASSAY OF NATRIURETIC PEPTIDE: CPT

## 2024-07-05 PROCEDURE — 83690 ASSAY OF LIPASE: CPT | Performed by: EMERGENCY MEDICINE

## 2024-07-05 PROCEDURE — 84145 PROCALCITONIN (PCT): CPT

## 2024-07-05 PROCEDURE — 80053 COMPREHEN METABOLIC PANEL: CPT | Performed by: EMERGENCY MEDICINE

## 2024-07-05 PROCEDURE — 71250 CT THORAX DX C-: CPT

## 2024-07-05 PROCEDURE — 93005 ELECTROCARDIOGRAM TRACING: CPT

## 2024-07-05 PROCEDURE — 85025 COMPLETE CBC W/AUTO DIFF WBC: CPT | Performed by: EMERGENCY MEDICINE

## 2024-07-05 PROCEDURE — 25010000002 ONDANSETRON PER 1 MG

## 2024-07-05 PROCEDURE — 25510000001 IOPAMIDOL 61 % SOLUTION: Performed by: EMERGENCY MEDICINE

## 2024-07-05 PROCEDURE — 74177 CT ABD & PELVIS W/CONTRAST: CPT

## 2024-07-05 PROCEDURE — 84484 ASSAY OF TROPONIN QUANT: CPT

## 2024-07-05 PROCEDURE — 96375 TX/PRO/DX INJ NEW DRUG ADDON: CPT

## 2024-07-05 PROCEDURE — 83605 ASSAY OF LACTIC ACID: CPT | Performed by: EMERGENCY MEDICINE

## 2024-07-05 PROCEDURE — 99285 EMERGENCY DEPT VISIT HI MDM: CPT

## 2024-07-05 RX ORDER — SODIUM CHLORIDE 9 MG/ML
10 INJECTION, SOLUTION INTRAMUSCULAR; INTRAVENOUS; SUBCUTANEOUS AS NEEDED
Status: DISCONTINUED | OUTPATIENT
Start: 2024-07-05 | End: 2024-07-06 | Stop reason: HOSPADM

## 2024-07-05 RX ORDER — ONDANSETRON 2 MG/ML
4 INJECTION INTRAMUSCULAR; INTRAVENOUS ONCE
Status: COMPLETED | OUTPATIENT
Start: 2024-07-05 | End: 2024-07-05

## 2024-07-05 RX ORDER — DOXYCYCLINE 100 MG/1
100 CAPSULE ORAL 2 TIMES DAILY
Qty: 14 CAPSULE | Refills: 0 | Status: SHIPPED | OUTPATIENT
Start: 2024-07-05

## 2024-07-05 RX ORDER — MORPHINE SULFATE 4 MG/ML
4 INJECTION, SOLUTION INTRAMUSCULAR; INTRAVENOUS ONCE
Status: DISCONTINUED | OUTPATIENT
Start: 2024-07-05 | End: 2024-07-05

## 2024-07-05 RX ORDER — ONDANSETRON 4 MG/1
4 TABLET, FILM COATED ORAL EVERY 6 HOURS PRN
Qty: 20 TABLET | Refills: 0 | Status: SHIPPED | OUTPATIENT
Start: 2024-07-05

## 2024-07-05 RX ADMIN — IOPAMIDOL 80 ML: 612 INJECTION, SOLUTION INTRAVENOUS at 21:26

## 2024-07-05 RX ADMIN — SODIUM CHLORIDE 1000 ML: 9 INJECTION, SOLUTION INTRAVENOUS at 20:15

## 2024-07-05 RX ADMIN — ONDANSETRON 4 MG: 2 INJECTION INTRAMUSCULAR; INTRAVENOUS at 21:12

## 2024-07-06 VITALS
RESPIRATION RATE: 19 BRPM | HEIGHT: 74 IN | OXYGEN SATURATION: 92 % | WEIGHT: 225 LBS | BODY MASS INDEX: 28.88 KG/M2 | DIASTOLIC BLOOD PRESSURE: 81 MMHG | TEMPERATURE: 99.3 F | HEART RATE: 80 BPM | SYSTOLIC BLOOD PRESSURE: 147 MMHG

## 2024-07-06 LAB
BACTERIA UR QL AUTO: NORMAL /HPF
BILIRUB UR QL STRIP: NEGATIVE
CLARITY UR: CLEAR
COLOR UR: YELLOW
GLUCOSE UR STRIP-MCNC: NEGATIVE MG/DL
HGB UR QL STRIP.AUTO: ABNORMAL
HYALINE CASTS UR QL AUTO: NORMAL /LPF
KETONES UR QL STRIP: NEGATIVE
LEUKOCYTE ESTERASE UR QL STRIP.AUTO: NEGATIVE
NITRITE UR QL STRIP: NEGATIVE
PH UR STRIP.AUTO: 5.5 [PH] (ref 5–8)
PROT UR QL STRIP: NEGATIVE
RBC # UR STRIP: NORMAL /HPF
REF LAB TEST METHOD: NORMAL
SP GR UR STRIP: 1.05 (ref 1–1.03)
SQUAMOUS #/AREA URNS HPF: NORMAL /HPF
UROBILINOGEN UR QL STRIP: ABNORMAL
WBC # UR STRIP: NORMAL /HPF

## 2024-07-06 PROCEDURE — 25010000002 PROCHLORPERAZINE 10 MG/2ML SOLUTION

## 2024-07-06 PROCEDURE — 81001 URINALYSIS AUTO W/SCOPE: CPT | Performed by: EMERGENCY MEDICINE

## 2024-07-06 PROCEDURE — 96365 THER/PROPH/DIAG IV INF INIT: CPT

## 2024-07-06 PROCEDURE — 25010000002 CEFTRIAXONE PER 250 MG

## 2024-07-06 PROCEDURE — 96375 TX/PRO/DX INJ NEW DRUG ADDON: CPT

## 2024-07-06 RX ORDER — PROCHLORPERAZINE EDISYLATE 5 MG/ML
10 INJECTION INTRAMUSCULAR; INTRAVENOUS ONCE
Status: COMPLETED | OUTPATIENT
Start: 2024-07-06 | End: 2024-07-06

## 2024-07-06 RX ADMIN — SODIUM CHLORIDE 1000 MG: 900 INJECTION INTRAVENOUS at 00:17

## 2024-07-06 RX ADMIN — PROCHLORPERAZINE EDISYLATE 10 MG: 5 INJECTION INTRAMUSCULAR; INTRAVENOUS at 01:06

## 2024-07-06 NOTE — DISCHARGE INSTRUCTIONS
Follow-up with your urologist Dr. Woodward as scheduled on July 19.  Would asked that you call them first of next week that would communicate the need for evaluation of the right renal cyst in case they want to change the type of appointment.    Your antibiotic twice daily for the next 7 days.  Take the nausea medicine as prescribed.  Also maintain your hydration.  It is also reasonable to call your nephrologist and communicate about the renal cyst that the urologist is going to follow-up on.

## 2024-07-06 NOTE — ED PROVIDER NOTES
Subjective   History of Present Illness patient is a 68-year-old male accompanied by his wife, presents emergency department complaining of 24+ hours of abdominal pain, accompanied by nausea, diarrhea episode once today, fatigue.  Denies sick contacts, he does endorse upper respiratory congestion with rhinorrhea leading to a cough but denies chest pain or shortness of breath.  He reports a previous history of numerous upper and lower endoscopes, without concern by Dr. Lopez.  Reports a stable history of chronic kidney disease followed by Dr. Azevedo.  He reports regular follow-ups with Dr. Woodward for prostate checks.  He reports he went to the [employer] for chipping and putting today as well as to her grandchild's T-ball game, and the activity does not exacerbate the pain it is constant.  Awake, alert, but ill-appearing.    Review of Systems   Constitutional:  Positive for fatigue.   HENT:  Positive for congestion and rhinorrhea.    Respiratory: Negative.     Cardiovascular: Negative.    Gastrointestinal:  Positive for abdominal pain, diarrhea and vomiting.   Genitourinary: Negative.    Musculoskeletal: Negative.    Skin: Negative.    Neurological: Negative.        Past Medical History:   Diagnosis Date    Back problem     Benign prostate hyperplasia     Glaucoma     Hypertension     Nephrolithiasis     Renal insufficiency        Allergies   Allergen Reactions    Erythromycin Unknown - Low Severity    Clarithromycin Other (See Comments)     Unknown;  BIAXIN       Past Surgical History:   Procedure Laterality Date    COLONOSCOPY      Multiple times    EYE SURGERY      Lasix    WISDOM TOOTH EXTRACTION         Family History   Problem Relation Age of Onset    Hypertension Mother     Cancer Mother     Diabetes Father     Cancer Father     Hypertension Brother     Cancer Maternal Grandfather     Coronary artery disease Paternal Grandmother     Stroke Paternal Grandfather        Social History     Socioeconomic History     Marital status:    Tobacco Use    Smoking status: Never     Passive exposure: Never    Smokeless tobacco: Never   Vaping Use    Vaping status: Never Used   Substance and Sexual Activity    Alcohol use: Never    Drug use: Never    Sexual activity: Not Currently     Partners: Female           Objective   Physical Exam  Constitutional:       Appearance: He is well-developed. He is ill-appearing.   HENT:      Head: Normocephalic and atraumatic.   Eyes:      Extraocular Movements: Extraocular movements intact.      Pupils: Pupils are equal, round, and reactive to light.   Cardiovascular:      Rate and Rhythm: Normal rate.   Pulmonary:      Effort: Pulmonary effort is normal.      Breath sounds: Normal breath sounds.   Abdominal:      General: Abdomen is flat. Bowel sounds are normal.      Palpations: Abdomen is soft.      Tenderness: There is abdominal tenderness in the right lower quadrant, periumbilical area and left lower quadrant. There is no right CVA tenderness or left CVA tenderness.      Hernia: Hernia is present in the ventral area.   Skin:     General: Skin is warm.      Capillary Refill: Capillary refill takes less than 2 seconds.   Neurological:      General: No focal deficit present.      Mental Status: He is alert and oriented to person, place, and time.   Psychiatric:         Mood and Affect: Mood normal.         Behavior: Behavior normal.         Procedures           ED Course  ED Course as of 07/06/24 0031   Fri Jul 05, 2024 2048 Initially evaluated ED bed 4, accompanied by his wife. [JH]   2112 CBC without actionable abnormality, serum chemistry with creatinine elevated comparison reveals stage II kidney disease that is stable.  Cardiac troponin and BNP negative.  Lactate and lipase negative.  Awaiting urinalysis result as well as viral swab and CT imaging. [JH]   4910 Evaluation of the patient.  He reports his nausea has completely resolved with only Renastep's ondansetron 4 mg.  I also  discussed with him his wife, his daughter who is an RN over speaker phone the finding of the right renal cyst that warrants follow-up with urology.  And the indication to do the CT of his chest for concern over atelectasis versus lingular consolidation in the right lobes. []   2326 Patient's CT chest, with lingular consolidation of the left lung, consistent with pneumonia.  Also requested images on a disc for patient's follow-up with the right renal cyst.  Will communicate results to the patient and plan antibiotic therapy. []   2349 Communicated workup findings to the patient and his wife.  We have prompted him again for a urine specimen as it was apparently lost in the tube station and this will be the last component of his workup and we will plan discharge. []   Sat Jul 06, 2024   0008 Urine specimen has been collected. []   0028 This result now available without abnormality with concern for his previously described renal cyst []      ED Course User Index  [] Oleg Green, PERFECTO                                             Medical Decision Making  The patient's complaints, and my examination, differential diagnosis includes enterocolitis, other GI viral syndrome, cannot exclude upper respiratory viral infection, appendicitis though less likely cannot be excluded as well as cholecystitis, urinary tract infection.  Patient will have serum screening labs, urinalysis, CT imaging abdomen pelvis with IV contrast if indicated.  Patient given antiemetic and analgesic medication in addition to IV crystalloid.  He will be reevaluated for improvement.  We will communicate results of his workup and plan disposition including follow-up and consultation as indicated.  He is agreeable with this plan.    Amount and/or Complexity of Data Reviewed  Labs: ordered.  Radiology: ordered.  ECG/medicine tests: ordered.    Risk  Prescription drug management.        Final diagnoses:   Lingular pneumonia   Renal cyst, right    Other microscopic hematuria       ED Disposition  ED Disposition       ED Disposition   Discharge    Condition   Stable    Comment   --               Josh Ortiz MD  [address]  [address]  [place] [address]  [phone]      As needed    Shankar Woodward MD  [address]  [place] [address]  [phone]    On 7/19/2024           Medication List        New Prescriptions      doxycycline 100 MG capsule  Commonly known as: MONODOX  Take 1 capsule by mouth 2 (Two) Times a Day.     ondansetron 4 MG tablet  Commonly known as: ZOFRAN  Take 1 tablet by mouth Every 6 (Six) Hours As Needed for Nausea or Vomiting.               Where to Get Your Medications        These medications were sent to [id number] - [place], [place] - [address] AT [facility] - [phone] [facility] [phone]   [address] [facility] [address]      Phone: [phone]   doxycycline 100 MG capsule  ondansetron 4 MG tablet            Oleg Green, APRN  07/06/24 0035

## 2024-07-07 LAB
QT INTERVAL: 376 MS
QTC INTERVAL: 439 MS

## 2024-07-12 ENCOUNTER — OFFICE VISIT (OUTPATIENT)
Dept: INTERNAL MEDICINE | Facility: CLINIC | Age: 69
End: 2024-07-12
Payer: MEDICARE

## 2024-07-12 VITALS
TEMPERATURE: 97.8 F | WEIGHT: 225 LBS | OXYGEN SATURATION: 95 % | SYSTOLIC BLOOD PRESSURE: 126 MMHG | HEIGHT: 74 IN | DIASTOLIC BLOOD PRESSURE: 72 MMHG | HEART RATE: 68 BPM | BODY MASS INDEX: 28.88 KG/M2

## 2024-07-12 DIAGNOSIS — E78.2 MIXED HYPERLIPIDEMIA: ICD-10-CM

## 2024-07-12 DIAGNOSIS — Z00.00 ANNUAL PHYSICAL EXAM: Primary | ICD-10-CM

## 2024-07-12 DIAGNOSIS — I10 PRIMARY HYPERTENSION: ICD-10-CM

## 2024-07-12 DIAGNOSIS — F32.5 MAJOR DEPRESSIVE DISORDER WITH SINGLE EPISODE, IN FULL REMISSION: ICD-10-CM

## 2024-07-12 DIAGNOSIS — R73.03 PREDIABETES: ICD-10-CM

## 2024-07-12 DIAGNOSIS — N18.32 STAGE 3B CHRONIC KIDNEY DISEASE: ICD-10-CM

## 2024-07-12 LAB
EXPIRATION DATE: ABNORMAL
HBA1C MFR BLD: 5.8 % (ref 4.5–5.7)
Lab: ABNORMAL

## 2024-07-12 PROCEDURE — 3044F HG A1C LEVEL LT 7.0%: CPT | Performed by: INTERNAL MEDICINE

## 2024-07-12 PROCEDURE — 1160F RVW MEDS BY RX/DR IN RCRD: CPT | Performed by: INTERNAL MEDICINE

## 2024-07-12 PROCEDURE — 3074F SYST BP LT 130 MM HG: CPT | Performed by: INTERNAL MEDICINE

## 2024-07-12 PROCEDURE — 3078F DIAST BP <80 MM HG: CPT | Performed by: INTERNAL MEDICINE

## 2024-07-12 PROCEDURE — G0439 PPPS, SUBSEQ VISIT: HCPCS | Performed by: INTERNAL MEDICINE

## 2024-07-12 PROCEDURE — 1159F MED LIST DOCD IN RCRD: CPT | Performed by: INTERNAL MEDICINE

## 2024-07-12 PROCEDURE — 1126F AMNT PAIN NOTED NONE PRSNT: CPT | Performed by: INTERNAL MEDICINE

## 2024-07-12 PROCEDURE — 83036 HEMOGLOBIN GLYCOSYLATED A1C: CPT | Performed by: INTERNAL MEDICINE

## 2024-07-12 RX ORDER — ONDANSETRON 4 MG/1
4 TABLET, ORALLY DISINTEGRATING ORAL EVERY 8 HOURS PRN
Qty: 30 TABLET | Refills: 1 | Status: SHIPPED | OUTPATIENT
Start: 2024-07-12

## 2024-07-12 NOTE — PROGRESS NOTES
QUICK REFERENCE INFORMATION:  The ABCs of the Annual Wellness Visit    Subsequent Medicare Wellness Visit    HEALTH RISK ASSESSMENT    1955    Recent Hospitalizations:  No hospitalization(s) within the last year..        Current Medical Providers:  Patient Care Team:  Tae Dixon PA as PCP - General (Nephrology)  Shankar Woodward MD as Consulting Physician (Urology)  Ryan Lopez MD as Consulting Physician (Gastroenterology)  Darryl Hayes MD as Consulting Physician (Ophthalmology)  Josh Ortiz MD as Referring Physician (Internal Medicine)        Smoking Status:  Social History     Tobacco Use   Smoking Status Never    Passive exposure: Never   Smokeless Tobacco Never       Alcohol Consumption:  Social History     Substance and Sexual Activity   Alcohol Use Never       Depression Screen:       2024     8:54 AM   PHQ-2/PHQ-9 Depression Screening   Little Interest or Pleasure in Doing Things 0-->not at all   Feeling Down, Depressed or Hopeless 0-->not at all   PHQ-9: Brief Depression Severity Measure Score 0       Health Habits and Functional and Cognitive Screenin/12/2024     8:48 AM   Functional & Cognitive Status   Do you have difficulty preparing food and eating? No   Do you have difficulty bathing yourself, getting dressed or grooming yourself? No   Do you have difficulty using the toilet? No   Do you have difficulty moving around from place to place? No   Do you have trouble with steps or getting out of a bed or a chair? No   Current Diet Well Balanced Diet   Dental Exam Up to date   Eye Exam Up to date   Exercise (times per week) 7 times per week   Current Exercises Include Walking   Do you need help using the phone?  No   Are you deaf or do you have serious difficulty hearing?  No   Do you need help to go to places out of walking distance? No   Do you need help shopping? No   Do you need help preparing meals?  No   Do you need help with  housework?  No   Do you need help with laundry? No   Do you need help taking your medications? No   Do you need help managing money? No   Do you ever drive or ride in a car without wearing a seat belt? No   Have you felt unusual stress, anger or loneliness in the last month? No   Who do you live with? Spouse   If you need help, do you have trouble finding someone available to you? No   Have you been bothered in the last four weeks by sexual problems? No   Do you have difficulty concentrating, remembering or making decisions? No       Fall Risk Screen:  STEADI Fall Risk Assessment was completed, and patient is at LOW risk for falls.Assessment completed on:7/12/2024    ACE III MINI        Does the patient have evidence of cognitive impairment? No    Aspirin use counseling: Does not need ASA (and currently is not on it)    Recent Lab Results:  CMP:  Lab Results   Component Value Date    BUN 16 07/05/2024    CREATININE 1.61 (H) 07/05/2024    EGFRIFNONA 43 (L) 12/01/2021    BCR 9.9 07/05/2024     07/05/2024    K 3.3 (L) 07/05/2024    CO2 23.0 07/05/2024    CALCIUM 9.3 07/05/2024    ALBUMIN 4.0 07/05/2024    BILITOT 0.9 07/05/2024    ALKPHOS 142 (H) 07/05/2024    AST 30 07/05/2024    ALT 38 07/05/2024     HbA1c:  Lab Results   Component Value Date    HGBA1C 5.8 (A) 07/12/2024    HGBA1C 5.70 (H) 01/17/2024     Microalbumin:  Lab Results   Component Value Date    MICROALBUR <1.2 05/03/2023     Lipid Panel  Lab Results   Component Value Date    CHOL 177 01/17/2024    TRIG 187 (H) 01/17/2024    HDL 41 01/17/2024     (H) 01/17/2024    AST 30 07/05/2024    ALT 38 07/05/2024       Visual Acuity:  No results found.    Age-appropriate Screening Schedule:  Refer to the list below for future screening recommendations based on patient's age, sex and/or medical conditions. Orders for these recommended tests are listed in the plan section. The patient has been provided with a written plan.    Health Maintenance   Topic  Date Due    ZOSTER VACCINE (2 of 3) 11/24/2016    BMI FOLLOWUP  01/16/2024    COVID-19 Vaccine (6 - 2023-24 season) 02/16/2024    ANNUAL WELLNESS VISIT  07/10/2024    INFLUENZA VACCINE  08/01/2024    LIPID PANEL  01/17/2025    COLORECTAL CANCER SCREENING  07/01/2026    TDAP/TD VACCINES (3 - Td or Tdap) 06/10/2034    HEPATITIS C SCREENING  Completed    Pneumococcal Vaccine 65+  Completed      Results      Subjective   History of Present Illness  The patient is a 68-year-old male who comes in for subsequent Medicare annual wellness examination and for follow-up of hypertension, hyperlipidemia, prediabetes, chronic kidney disease, and depression.    The patient began experiencing nausea last Wednesday, which escalated on Thursday, accompanied by bloating and nausea. He sought medical attention on Friday, where two CT scans were conducted, along with a series of blood tests. The scan revealed pneumonia, however, the patient did not exhibit significant symptoms. He also reported mild dyspnea. His daughter, a nurse, noted a slight decrease in his oxygenation levels, which remained consistent between 94 and 95 percent, with occasional spikes to 92 or 93 percent.    Supplemental Information  He has a cyst on his right kidney and is scheduled to see Dr. Woodward next Friday.   He is up to date on his pneumonia and RSV vaccines.    Donta Ledesma Jr. is a 68 y.o. male who presents for a Subsequent Wellness Visit.    No opioid medication identified on active medication list. I have reviewed chart for other potential  high risk medication/s and harmful drug interactions in the elderly.              CHRONIC CONDITIONS    The following portions of the patient's history were reviewed and updated as appropriate: allergies, current medications, past family history, past medical history, past social history, past surgical history, and problem list.    Outpatient Medications Prior to Visit   Medication Sig Dispense Refill    amLODIPine  (NORVASC) 10 MG tablet TAKE 1 TABLET BY MOUTH DAILY 90 tablet 3    atorvastatin (LIPITOR) 20 MG tablet TAKE 1 TABLET BY MOUTH DAILY 90 tablet 3    buPROPion XL (WELLBUTRIN XL) 300 MG 24 hr tablet TAKE 1 TABLET BY MOUTH DAILY 90 tablet 3    Cholecalciferol (Vitamin D3) 50 MCG (2000 UT) tablet Take 1 tablet by mouth 4 (Four) Times a Week.      doxazosin (CARDURA) 4 MG tablet TAKE 1 TABLET BY MOUTH EVERY NIGHT 90 tablet 3    doxycycline (MONODOX) 100 MG capsule Take 1 capsule by mouth 2 (Two) Times a Day. 14 capsule 0    hydroCHLOROthiazide (HYDRODIURIL) 12.5 MG tablet TAKE 1 TABLET BY MOUTH DAILY 90 tablet 3    potassium chloride 10 MEQ CR tablet TAKE 1 TABLET BY MOUTH TWICE DAILY 180 tablet 1    sertraline (ZOLOFT) 25 MG tablet TAKE 1 TABLET BY MOUTH DAILY 90 tablet 3    ondansetron (ZOFRAN) 4 MG tablet Take 1 tablet by mouth Every 6 (Six) Hours As Needed for Nausea or Vomiting. 20 tablet 0     No facility-administered medications prior to visit.       Patient Active Problem List   Diagnosis    Irritable bowel syndrome    Annual physical exam    Mixed hyperlipidemia    Primary hypertension    Chronic kidney disease, stage III (moderate)    Sleep apnea    Disc disorder of cervical region    Prediabetes    Major depressive disorder with single episode, in full remission       Advance Care Planning:  ACP discussion was held with the patient during this visit. Patient has an advance directive in EMR which is still valid.       Identification of Risk Factors:  Risk factors include: Advance Directive Discussion.    Review of Systems   Constitutional:  Negative for chills, fatigue and fever.   HENT:  Negative for congestion, ear pain and sinus pressure.    Respiratory:  Negative for cough, chest tightness, shortness of breath and wheezing.    Cardiovascular:  Negative for chest pain and palpitations.   Gastrointestinal:  Negative for abdominal pain, blood in stool and constipation.   Skin:  Negative for color change.    Allergic/Immunologic: Negative for environmental allergies.   Neurological:  Negative for dizziness, speech difficulty and headaches.   Psychiatric/Behavioral:  Negative for confusion. The patient is not nervous/anxious.      Physical Exam      Compared to one year ago, the patient feels his physical health is the same.  Compared to one year ago, the patient feels his mental health is the same.    Objective     Physical Exam  Vitals and nursing note reviewed.   Constitutional:       Appearance: Normal appearance. He is well-developed.   HENT:      Head: Normocephalic and atraumatic.      Right Ear: Tympanic membrane, ear canal and external ear normal.      Left Ear: Tympanic membrane, ear canal and external ear normal.      Nose: Nose normal.      Mouth/Throat:      Pharynx: Oropharynx is clear. No oropharyngeal exudate or posterior oropharyngeal erythema.   Eyes:      Extraocular Movements: Extraocular movements intact.      Conjunctiva/sclera: Conjunctivae normal.      Pupils: Pupils are equal, round, and reactive to light.   Neck:      Thyroid: No thyromegaly.      Vascular: No carotid bruit or JVD.   Cardiovascular:      Rate and Rhythm: Normal rate and regular rhythm.      Heart sounds: Normal heart sounds. No murmur heard.     No friction rub. No gallop.   Pulmonary:      Effort: Pulmonary effort is normal. No respiratory distress.      Breath sounds: Normal breath sounds. No wheezing or rales.   Chest:      Chest wall: No tenderness.   Abdominal:      General: Bowel sounds are normal. There is no distension.      Palpations: Abdomen is soft. There is no mass.      Tenderness: There is no abdominal tenderness. There is no guarding or rebound.      Hernia: No hernia is present.   Genitourinary:     Penis: Normal.       Prostate: Normal.      Rectum: Normal. Guaiac result negative.   Musculoskeletal:         General: No tenderness. Normal range of motion.      Cervical back: Normal range of motion and neck  "supple.      Right lower leg: No edema.      Left lower leg: No edema.   Lymphadenopathy:      Cervical: No cervical adenopathy.   Skin:     General: Skin is warm and dry.      Findings: No erythema or rash.   Neurological:      Mental Status: He is alert and oriented to person, place, and time.      Cranial Nerves: No cranial nerve deficit.      Motor: No weakness or abnormal muscle tone.      Gait: Gait normal.   Psychiatric:         Mood and Affect: Mood normal.         Behavior: Behavior normal.         Thought Content: Thought content normal.         Judgment: Judgment normal.          Procedures     Vitals:    07/12/24 0845   BP: 126/72   BP Location: Left arm   Patient Position: Sitting   Cuff Size: Adult   Pulse: 68   Temp: 97.8 °F (36.6 °C)   TempSrc: Temporal   SpO2: 95%   Weight: 102 kg (225 lb)   Height: 187.3 cm (73.75\")       BMI is >= 25 and <30. (Overweight) The following options were offered after discussion;: exercise counseling/recommendations and nutrition counseling/recommendations      Assessment & Plan   Assessment & Plan  1. Prevention.  The patient's overall health status is satisfactory. Recent pneumonia and gastrointestinal symptoms have been noted, however, he is contemplating potential renal cell carcinoma and nephrectomy.    2. Hypertension.  The patient's blood pressure is well-managed with amlodipine, doxazosin, and hydrochlorothiazide.    3. Hyperlipidemia.  His lipid levels are well-managed. The current regimen of atorvastatin will be maintained. Repeat labs will be conducted during the next visit.    4. Prediabetes.  His A1c is pending. The patient is advised to maintain a healthy diet and avoid weight gain.    5. Chronic kidney disease.  His GFR remains stable at 46. A consultation with his urologist is scheduled for the following week, during which the management of the solid right renal lesion will be discussed. Nephrectomy may be necessary, although he is already under the care " of a nephrologist.    6. Depression.  His mood appears to be well-managed. Should he struggle with potential surgery, the dosage of sertraline can be increased.    Follow-up  A follow-up appointment is scheduled for 6 months from now.    Problem List Items Addressed This Visit       Annual physical exam - Primary    Mixed hyperlipidemia    Relevant Medications    atorvastatin (LIPITOR) 20 MG tablet    Primary hypertension    Relevant Medications    amLODIPine (NORVASC) 10 MG tablet    hydroCHLOROthiazide (HYDRODIURIL) 12.5 MG tablet    doxazosin (CARDURA) 4 MG tablet    Chronic kidney disease, stage III (moderate)    Relevant Medications    hydroCHLOROthiazide (HYDRODIURIL) 12.5 MG tablet    Prediabetes    Relevant Orders    POC Glycosylated Hemoglobin (Hb A1C) (Completed)    Major depressive disorder with single episode, in full remission    Relevant Medications    buPROPion XL (WELLBUTRIN XL) 300 MG 24 hr tablet    sertraline (ZOLOFT) 25 MG tablet     Patient Self-Management and Personalized Health Advice  The patient has been provided with information about: diet and exercise and preventive services including:   Annual Wellness Visit (AWV).    Outpatient Encounter Medications as of 7/12/2024   Medication Sig Dispense Refill    amLODIPine (NORVASC) 10 MG tablet TAKE 1 TABLET BY MOUTH DAILY 90 tablet 3    atorvastatin (LIPITOR) 20 MG tablet TAKE 1 TABLET BY MOUTH DAILY 90 tablet 3    buPROPion XL (WELLBUTRIN XL) 300 MG 24 hr tablet TAKE 1 TABLET BY MOUTH DAILY 90 tablet 3    Cholecalciferol (Vitamin D3) 50 MCG (2000 UT) tablet Take 1 tablet by mouth 4 (Four) Times a Week.      doxazosin (CARDURA) 4 MG tablet TAKE 1 TABLET BY MOUTH EVERY NIGHT 90 tablet 3    doxycycline (MONODOX) 100 MG capsule Take 1 capsule by mouth 2 (Two) Times a Day. 14 capsule 0    hydroCHLOROthiazide (HYDRODIURIL) 12.5 MG tablet TAKE 1 TABLET BY MOUTH DAILY 90 tablet 3    potassium chloride 10 MEQ CR tablet TAKE 1 TABLET BY MOUTH TWICE DAILY  180 tablet 1    sertraline (ZOLOFT) 25 MG tablet TAKE 1 TABLET BY MOUTH DAILY 90 tablet 3    [DISCONTINUED] ondansetron (ZOFRAN) 4 MG tablet Take 1 tablet by mouth Every 6 (Six) Hours As Needed for Nausea or Vomiting. 20 tablet 0    ondansetron ODT (ZOFRAN-ODT) 4 MG disintegrating tablet Place 1 tablet on the tongue Every 8 (Eight) Hours As Needed for Nausea or Vomiting. 30 tablet 1     No facility-administered encounter medications on file as of 7/12/2024.       Reviewed use of high risk medication in the elderly: yes  Reviewed for potential of harmful drug interactions in the elderly: yes    Follow Up:  Return in about 6 months (around 1/12/2025) for follow up.     There are no Patient Instructions on file for this visit.    An After Visit Summary and PPPS with all of these plans were given to the patient.             Transcribed from ambient dictation for Josh Ortiz MD by Camelia Bunch MA.  07/12/24   09:27 EDT     Patient or patient representative verbalized consent for the use of Ambient Listening during the visit with  Josh Ortiz MD for chart documentation. 7/14/2024  17:06 EDT

## 2024-07-12 NOTE — PROGRESS NOTES
"QUICK REFERENCE INFORMATION:  The ABCs of the Annual Wellness Visit    Subsequent Medicare Wellness Visit    HEALTH RISK ASSESSMENT    1955    Recent Hospitalizations:  {Hospitalization history:[id number]::\"No hospitalization(s) within the last year.\"}.        Current Medical Providers:  Patient Care Team:  Josh Ortiz MD as PCP - General (Internal Medicine)  Shankar Woodward MD as Consulting Physician (Urology)  Ryan Lopez MD as Consulting Physician (Gastroenterology)  Darryl Hayes MD as Consulting Physician (Ophthalmology)  Josh Ortiz MD as Referring Physician (Internal Medicine)        Smoking Status:  Social History     Tobacco Use   Smoking Status Never    Passive exposure: Never   Smokeless Tobacco Never       Alcohol Consumption:  Social History     Substance and Sexual Activity   Alcohol Use Never       Depression Screen:       7/10/2023     8:34 AM   PHQ-2/PHQ-9 Depression Screening   Little Interest or Pleasure in Doing Things 0-->not at all   Feeling Down, Depressed or Hopeless 0-->not at all   PHQ-9: Brief Depression Severity Measure Score 0       Health Habits and Functional and Cognitive Screenin/10/2023     8:33 AM   Functional & Cognitive Status   Do you have difficulty preparing food and eating? No   Do you have difficulty bathing yourself, getting dressed or grooming yourself? No   Do you have difficulty using the toilet? No   Do you have difficulty moving around from place to place? No   Do you have trouble with steps or getting out of a bed or a chair? No   Current Diet Well Balanced Diet   Dental Exam Up to date   Eye Exam Up to date   Exercise (times per week) 7 times per week   Current Exercises Include Walking        Exercise Comment golf   Do you need help using the phone?  No   Are you deaf or do you have serious difficulty hearing?  No   Do you need help to go to places out of walking distance? No   Do you need help shopping? No " "  Do you need help preparing meals?  No   Do you need help with housework?  No   Do you need help with laundry? No   Do you need help taking your medications? No   Do you need help managing money? No   Do you ever drive or ride in a car without wearing a seat belt? No   Have you felt unusual stress, anger or loneliness in the last month? No   Who do you live with? Spouse   If you need help, do you have trouble finding someone available to you? No   Have you been bothered in the last four weeks by sexual problems? No   Do you have difficulty concentrating, remembering or making decisions? No       Fall Risk Screen:  BLAKE Fall Risk Assessment has not been completed.    ACE III MINI        Does the patient have evidence of cognitive impairment? {Yes/No w/ pre-defaulted No:74395::\"No\"}    Aspirin use counseling: {Aspirin :62364}    Recent Lab Results:  CMP:  Lab Results   Component Value Date    BUN 16 07/05/2024    CREATININE 1.61 (H) 07/05/2024    EGFRIFNONA 43 (L) 12/01/2021    BCR 9.9 07/05/2024     07/05/2024    K 3.3 (L) 07/05/2024    CO2 23.0 07/05/2024    CALCIUM 9.3 07/05/2024    ALBUMIN 4.0 07/05/2024    BILITOT 0.9 07/05/2024    ALKPHOS 142 (H) 07/05/2024    AST 30 07/05/2024    ALT 38 07/05/2024     HbA1c:  Lab Results   Component Value Date    HGBA1C 5.70 (H) 01/17/2024    HGBA1C 6.10 (H) 05/03/2023     Microalbumin:  Lab Results   Component Value Date    MICROALBUR <1.2 05/03/2023     Lipid Panel  Lab Results   Component Value Date    CHOL 177 01/17/2024    TRIG 187 (H) 01/17/2024    HDL 41 01/17/2024     (H) 01/17/2024    AST 30 07/05/2024    ALT 38 07/05/2024       Visual Acuity:  No results found.    Age-appropriate Screening Schedule:  Refer to the list below for future screening recommendations based on patient's age, sex and/or medical conditions. Orders for these recommended tests are listed in the plan section. The patient has been provided with a written plan.    Health " "Maintenance   Topic Date Due    ZOSTER VACCINE (2 of 3) 11/24/2016    BMI FOLLOWUP  01/16/2024    COVID-19 Vaccine (6 - 2023-24 season) 02/16/2024    ANNUAL WELLNESS VISIT  07/10/2024    INFLUENZA VACCINE  08/01/2024    LIPID PANEL  01/17/2025    COLORECTAL CANCER SCREENING  07/01/2026    TDAP/TD VACCINES (3 - Td or Tdap) 06/10/2034    HEPATITIS C SCREENING  Completed    Pneumococcal Vaccine 65+  Completed        Subjective   History of Present Illness    Donta Ledesma Jr. is a 68 y.o. male who presents for a Subsequent Wellness Visit.    CHRONIC CONDITIONS    The following portions of the patient's history were reviewed and updated as appropriate: {history reviewed:20406::\"allergies\",\"current medications\",\"past family history\",\"past medical history\",\"past social history\",\"past surgical history\",\"problem list\"}.    Outpatient Medications Prior to Visit   Medication Sig Dispense Refill    amLODIPine (NORVASC) 10 MG tablet TAKE 1 TABLET BY MOUTH DAILY 90 tablet 3    atorvastatin (LIPITOR) 20 MG tablet TAKE 1 TABLET BY MOUTH DAILY 90 tablet 3    buPROPion XL (WELLBUTRIN XL) 300 MG 24 hr tablet TAKE 1 TABLET BY MOUTH DAILY 90 tablet 3    Cholecalciferol (Vitamin D3) 50 MCG (2000 UT) tablet Take 1 tablet by mouth 4 (Four) Times a Week.      doxazosin (CARDURA) 4 MG tablet TAKE 1 TABLET BY MOUTH EVERY NIGHT 90 tablet 3    doxycycline (MONODOX) 100 MG capsule Take 1 capsule by mouth 2 (Two) Times a Day. 14 capsule 0    hydroCHLOROthiazide (HYDRODIURIL) 12.5 MG tablet TAKE 1 TABLET BY MOUTH DAILY 90 tablet 3    ondansetron (ZOFRAN) 4 MG tablet Take 1 tablet by mouth Every 6 (Six) Hours As Needed for Nausea or Vomiting. 20 tablet 0    potassium chloride 10 MEQ CR tablet TAKE 1 TABLET BY MOUTH TWICE DAILY 180 tablet 1    sertraline (ZOLOFT) 25 MG tablet TAKE 1 TABLET BY MOUTH DAILY 90 tablet 3     No facility-administered medications prior to visit.       Patient Active Problem List   Diagnosis    Irritable bowel syndrome "    Annual physical exam    Hypercholesterolemia    Primary hypertension    Chronic kidney disease, stage III (moderate)    Sleep apnea    Disc disorder of cervical region    Prediabetes    Major depressive disorder with single episode, in full remission       Advance Care Planning:  {Advance Directive Status:39948}    Identification of Risk Factors:  Risk factors include: {MC; WELLNESS RISK FACTORS:84784}.    Review of Systems    Compared to one year ago, the patient feels his physical health is {better worse same:83200}.  Compared to one year ago, the patient feels his mental health is {better worse same:39089}.    Objective     Physical Exam     Procedures     There were no vitals filed for this visit.    {BMI is >= 25 and <30. (Overweight) The following options were offered after discussion;:[id number]}      Assessment & Plan   Problem List Items Addressed This Visit    None    Patient Self-Management and Personalized Health Advice  The patient has been provided with information about: {; PERSONALIZED HEALTH ADVICE:25356} and preventive services including:   {plan:68585}.    Outpatient Encounter Medications as of 7/12/2024   Medication Sig Dispense Refill    amLODIPine (NORVASC) 10 MG tablet TAKE 1 TABLET BY MOUTH DAILY 90 tablet 3    atorvastatin (LIPITOR) 20 MG tablet TAKE 1 TABLET BY MOUTH DAILY 90 tablet 3    buPROPion XL (WELLBUTRIN XL) 300 MG 24 hr tablet TAKE 1 TABLET BY MOUTH DAILY 90 tablet 3    Cholecalciferol (Vitamin D3) 50 MCG (2000 UT) tablet Take 1 tablet by mouth 4 (Four) Times a Week.      doxazosin (CARDURA) 4 MG tablet TAKE 1 TABLET BY MOUTH EVERY NIGHT 90 tablet 3    doxycycline (MONODOX) 100 MG capsule Take 1 capsule by mouth 2 (Two) Times a Day. 14 capsule 0    hydroCHLOROthiazide (HYDRODIURIL) 12.5 MG tablet TAKE 1 TABLET BY MOUTH DAILY 90 tablet 3    ondansetron (ZOFRAN) 4 MG tablet Take 1 tablet by mouth Every 6 (Six) Hours As Needed for Nausea or Vomiting. 20 tablet 0    potassium  chloride 10 MEQ CR tablet TAKE 1 TABLET BY MOUTH TWICE DAILY 180 tablet 1    sertraline (ZOLOFT) 25 MG tablet TAKE 1 TABLET BY MOUTH DAILY 90 tablet 3     No facility-administered encounter medications on file as of 7/12/2024.       Reviewed use of high risk medication in the elderly: {Response; yes/no/na:63}  Reviewed for potential of harmful drug interactions in the elderly: {Response; yes/no/na:63}    Follow Up:  No follow-ups on file.     There are no Patient Instructions on file for this visit.    An After Visit Summary and PPPS with all of these plans were given to the patient.

## 2024-09-26 ENCOUNTER — READMISSION MANAGEMENT (OUTPATIENT)
Dept: CALL CENTER | Facility: HOSPITAL | Age: 69
End: 2024-09-26
Payer: MEDICARE

## 2024-09-26 RX ORDER — AMLODIPINE BESYLATE 10 MG/1
10 TABLET ORAL DAILY
Qty: 90 TABLET | Refills: 3 | OUTPATIENT
Start: 2024-09-26

## 2024-09-27 ENCOUNTER — TRANSITIONAL CARE MANAGEMENT TELEPHONE ENCOUNTER (OUTPATIENT)
Dept: CALL CENTER | Facility: HOSPITAL | Age: 69
End: 2024-09-27
Payer: MEDICARE

## 2024-10-03 ENCOUNTER — OFFICE VISIT (OUTPATIENT)
Dept: INTERNAL MEDICINE | Facility: CLINIC | Age: 69
End: 2024-10-03
Payer: MEDICARE

## 2024-10-03 VITALS
TEMPERATURE: 98.2 F | HEIGHT: 74 IN | OXYGEN SATURATION: 99 % | BODY MASS INDEX: 28.98 KG/M2 | SYSTOLIC BLOOD PRESSURE: 120 MMHG | DIASTOLIC BLOOD PRESSURE: 80 MMHG | HEART RATE: 88 BPM | WEIGHT: 225.8 LBS

## 2024-10-03 DIAGNOSIS — Z09 HOSPITAL DISCHARGE FOLLOW-UP: Primary | ICD-10-CM

## 2024-10-03 DIAGNOSIS — I10 PRIMARY HYPERTENSION: ICD-10-CM

## 2024-10-03 DIAGNOSIS — Z00.00 HEALTHCARE MAINTENANCE: ICD-10-CM

## 2024-10-03 RX ORDER — AMLODIPINE BESYLATE 10 MG/1
10 TABLET ORAL DAILY
Qty: 90 TABLET | Refills: 3 | Status: SHIPPED | OUTPATIENT
Start: 2024-10-03

## 2024-10-03 NOTE — PROGRESS NOTES
Transitional Care Follow Up Visit  Subjective     Donta Ledesma Jr. is a 68 y.o. male who presents for a transitional care management visit.    Within 48 business hours after discharge our office contacted him via telephone to coordinate his care and needs.      I reviewed and discussed the details of that call along with the discharge summary, hospital problems, inpatient lab results, inpatient diagnostic studies, and consultation reports with Donta.     Current outpatient and discharge medications have been reconciled for the patient.  Reviewed by: Bhavya Davidson MD          9/26/2024     6:46 PM   Date of TCM Phone Call   Hospital [facility]   Date of Admission 9/23/2024   Date of Discharge 9/26/2024   Discharge Disposition Home or Self Care     Risk for Readmission (LACE) No data recorded    History of Present Illness   Course During Hospital Stay:    Donta Ledesma Jr. is a 68 y.o. male who presents today for a hospital follow-up.  He was admitted to [facility] on 9/23 for an elective scheduled partial nephrectomy for a 3 cm lesion in his right posterior kidney.  Partial nephrectomy was converted to radical nephrectomy based on intraoperative findings.  Postop he had significant bump in creatinine that then stabilized.  He was discharged on postop day 3.  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  Creatinine on day of discharge was 2.35.  Creatinine prior to procedure on 9/16 was 1.75.    BMP collected on day of discharge notable for creatinine 2.35, potassium 3.2, sodium 133.  Blood pressure today 120/80. He follows with Dr. Azevedo with nephrology at [facility].  He has follow-up already scheduled on Monday with repeat labs.    He reports following discharge he did have some constipation which resolved after taking an enema.  He has been taking a daily stool softener which has been helping his bowels are now fairly normal.  He reports he will start taking  over-the-counter senna if constipation worsens.    He had surgery follow-up yesterday and there were no concerns at that appointment.  His sutures were removed and incision sites were covered with Steri-Strips.     Discharge medications:  Oxycodone 5 mg every 4 hours as needed up to 3 days    Medications continued on discharge:  Amlodipine 10 mg daily  Atorvastatin 20 mg daily  Bupropion 300 mg nightly  Vitamin D3 2000 units 4 times weekly  Hydrochlorothiazide 12.5 mg nightly  Sertraline 25 mg daily    Medications discontinued at discharge:  Potassium chloride 10 mg     The following portions of the patient's history were reviewed and updated as appropriate: allergies, current medications, past family history, past medical history, past social history, past surgical history, and problem list.    Review of Systems   Constitutional:  Negative for chills and fever.   Gastrointestinal:  Positive for constipation. Negative for diarrhea.   Neurological:  Negative for light-headedness and headaches.       Objective   There were no vitals taken for this visit.  Physical Exam  Constitutional:       Appearance: Normal appearance.   HENT:      Head: Normocephalic and atraumatic.   Eyes:      Extraocular Movements: Extraocular movements intact.      Conjunctiva/sclera: Conjunctivae normal.   Skin:     General: Skin is warm and dry.      Comments: Incision sites evaluated and appear to be healing well. Sutures were removed yesterday. No spreading erythema.    Neurological:      General: No focal deficit present.      Mental Status: He is alert and oriented to person, place, and time.   Psychiatric:         Mood and Affect: Mood normal.         Behavior: Behavior normal.         Assessment & Plan   Diagnoses and all orders for this visit:    1. Hospital discharge follow-up (Primary)  -Pain is well-controlled, incision sites without signs or symptoms of infection, having regular bowel movements with daily stool softener.  -Patient  follows with [facility] nephrology and already has follow-up scheduled with repeat labs on Monday  -Reviewed discharge medications, patient has no questions at this time.  -I can foresee no other postdischarge needs at this time.    2. Primary hypertension  -Patient is requesting refill of his amlodipine 10 mg daily.  Blood pressure 120/80 today, he reports at home blood pressure typically 130s/60-70.  -     amLODIPine (NORVASC) 10 MG tablet; Take 1 tablet by mouth Daily.  Dispense: 90 tablet; Refill: 3    3. Healthcare maintenance  -Will get flu shot today  -     Fluzone High-Dose 65+yrs      Bhavya Davidson MD

## 2024-10-16 ENCOUNTER — OFFICE VISIT (OUTPATIENT)
Dept: INTERNAL MEDICINE | Facility: CLINIC | Age: 69
End: 2024-10-16
Payer: MEDICARE

## 2024-10-16 VITALS
BODY MASS INDEX: 29.03 KG/M2 | HEIGHT: 74 IN | HEART RATE: 85 BPM | SYSTOLIC BLOOD PRESSURE: 112 MMHG | TEMPERATURE: 98.2 F | WEIGHT: 226.2 LBS | DIASTOLIC BLOOD PRESSURE: 76 MMHG

## 2024-10-16 DIAGNOSIS — M25.469 KNEE SWELLING: Primary | ICD-10-CM

## 2024-10-16 PROCEDURE — 1160F RVW MEDS BY RX/DR IN RCRD: CPT | Performed by: STUDENT IN AN ORGANIZED HEALTH CARE EDUCATION/TRAINING PROGRAM

## 2024-10-16 PROCEDURE — 3074F SYST BP LT 130 MM HG: CPT | Performed by: STUDENT IN AN ORGANIZED HEALTH CARE EDUCATION/TRAINING PROGRAM

## 2024-10-16 PROCEDURE — 3078F DIAST BP <80 MM HG: CPT | Performed by: STUDENT IN AN ORGANIZED HEALTH CARE EDUCATION/TRAINING PROGRAM

## 2024-10-16 PROCEDURE — 1126F AMNT PAIN NOTED NONE PRSNT: CPT | Performed by: STUDENT IN AN ORGANIZED HEALTH CARE EDUCATION/TRAINING PROGRAM

## 2024-10-16 PROCEDURE — 1159F MED LIST DOCD IN RCRD: CPT | Performed by: STUDENT IN AN ORGANIZED HEALTH CARE EDUCATION/TRAINING PROGRAM

## 2024-10-16 PROCEDURE — 99213 OFFICE O/P EST LOW 20 MIN: CPT | Performed by: STUDENT IN AN ORGANIZED HEALTH CARE EDUCATION/TRAINING PROGRAM

## 2024-10-16 NOTE — PROGRESS NOTES
Office Note     Name: Donta Ledesma Jr.    : 1955     MRN: [id number]     Chief Complaint  Fall (Right knee)    Subjective     History of Present Illness:  Donta Ledesma Jr. is a 68 y.o. male who presents today for anterior knee swelling.  He reports he fell onto his right knee around  and had a large hematoma overlying his anterior knee with diffuse bruising at that time.  The swelling has significantly improved however he still does have a discrete nodule on his proximal anterior knee.  On exam this nodule is fairly solid, movable, mildly tender.  There is no swelling of the joint space. He denies any pain at his knee or any changes in range of motion.  He denies catching or locking sensation or joint instability.    Review of Systems:   Review of Systems   Constitutional: Negative.    Respiratory: Negative.     Musculoskeletal:  Negative for arthralgias and gait problem.       Past Medical History:   Past Medical History:   Diagnosis Date    Allergic     Biaxin    Back problem     Benign prostate hyperplasia     Glaucoma     Hypertension     Nephrolithiasis     Renal insufficiency        Past Surgical History:   Past Surgical History:   Procedure Laterality Date    COLONOSCOPY      Multiple times    EYE SURGERY      Lasix    WISDOM TOOTH EXTRACTION         Family History:   Family History   Problem Relation Age of Onset    Hypertension Mother     Cancer Mother     Diabetes Father     Cancer Father     Hypertension Brother     Cancer Maternal Grandfather     Coronary artery disease Paternal Grandmother     Stroke Paternal Grandfather        Social History:   Social History     Socioeconomic History    Marital status:    Tobacco Use    Smoking status: Never     Passive exposure: Never    Smokeless tobacco: Never   Vaping Use    Vaping status: Never Used   Substance and Sexual Activity    Alcohol use: Never    Drug use: Never    Sexual activity: Not Currently     Partners: Female        Immunizations:   Immunization History   Administered Date(s) Administered    Arexvy (RSV, Adults 60+ yrs) 11/16/2023    COVID-19 (PFIZER) 12YRS+ (COMIRNATY) 10/16/2023    COVID-19 (PFIZER) BIVALENT 12+YRS 10/20/2022    COVID-19 (PFIZER) Purple Cap Monovalent 03/03/2021, 03/26/2021, 10/14/2021    Fluad Quad 65+ 10/21/2020    Fluzone  >6mos 09/13/2018    Fluzone High-Dose 65+YRS 10/03/2024    Fluzone High-Dose 65+yrs 11/18/2021, 11/03/2022, 10/30/2023    Fluzone Quad >6mos (Multi-dose) 09/19/2016, 09/13/2018    Influenza TIV (IM) 11/10/2009    Influenza, Unspecified 09/20/2017    Pneumococcal Conjugate 13-Valent (PCV13) 05/14/2020    Pneumococcal Polysaccharide (PPSV23) 06/01/2021    Tdap 02/12/2009, 06/10/2024    Zostavax 09/29/2016    Zoster, Unspecified 09/29/2016        Medications:     Current Outpatient Medications:     amLODIPine (NORVASC) 10 MG tablet, Take 1 tablet by mouth Daily., Disp: 90 tablet, Rfl: 3    atorvastatin (LIPITOR) 20 MG tablet, TAKE 1 TABLET BY MOUTH DAILY, Disp: 90 tablet, Rfl: 3    buPROPion XL (WELLBUTRIN XL) 300 MG 24 hr tablet, TAKE 1 TABLET BY MOUTH DAILY, Disp: 90 tablet, Rfl: 3    Cholecalciferol (Vitamin D3) 50 MCG (2000 UT) tablet, Take 1 tablet by mouth 4 (Four) Times a Week., Disp: , Rfl:     doxycycline (MONODOX) 100 MG capsule, Take 1 capsule by mouth 2 (Two) Times a Day., Disp: 14 capsule, Rfl: 0    ondansetron ODT (ZOFRAN-ODT) 4 MG disintegrating tablet, Place 1 tablet on the tongue Every 8 (Eight) Hours As Needed for Nausea or Vomiting., Disp: 30 tablet, Rfl: 1    sertraline (ZOLOFT) 25 MG tablet, TAKE 1 TABLET BY MOUTH DAILY, Disp: 90 tablet, Rfl: 3    hydroCHLOROthiazide (HYDRODIURIL) 12.5 MG tablet, TAKE 1 TABLET BY MOUTH DAILY (Patient not taking: Reported on 10/16/2024), Disp: 90 tablet, Rfl: 3    potassium chloride 10 MEQ CR tablet, TAKE 1 TABLET BY MOUTH TWICE DAILY (Patient not taking: Reported on 10/16/2024), Disp: 180 tablet, Rfl: 1    Allergies:   Allergies  "  Allergen Reactions    Erythromycin Unknown - Low Severity    Clarithromycin Other (See Comments)     Unknown;  BIAXIN       Objective     Vital Signs  /76 (BP Location: Left arm, Patient Position: Sitting, Cuff Size: Adult)   Pulse 85   Temp 98.2 °F (36.8 °C) (Temporal)   Ht 187.3 cm (73.74\")   Wt 103 kg (226 lb 3.2 oz)   BMI 29.25 kg/m²   Body mass index is 29.25 kg/m².     Physical Exam  Constitutional:       Appearance: Normal appearance.   HENT:      Head: Normocephalic and atraumatic.   Eyes:      Extraocular Movements: Extraocular movements intact.      Conjunctiva/sclera: Conjunctivae normal.   Musculoskeletal:      Comments: Discrete well circumscribed mildly tender nodule on proximal anterior knee. Freely movable.    Skin:     General: Skin is warm and dry.   Neurological:      General: No focal deficit present.      Mental Status: He is alert and oriented to person, place, and time.   Psychiatric:         Mood and Affect: Mood normal.         Behavior: Behavior normal.            Assessment and Plan     Assessment/Plan:  Diagnoses and all orders for this visit:    1. Knee swelling (Primary)  -The swelling is not located in the joint space that is more consistent with a nodule on his anterior knee.  Differential diagnosis includes hematoma, cyst, lipoma.  The swelling is not overly bothersome to him, does not cause pain, does not interfere with his range of motion.  -I will order soft tissue ultrasound for better characterization of this nodule.  If it is cystic may be able to refer for aspiration.  -     US Soft Tissue; Future    Follow Up  No follow-ups on file.    Bhavya Davidson MD   [facility] [place], [place] [facility] [address]  "

## 2024-10-21 RX ORDER — POTASSIUM CHLORIDE 750 MG/1
TABLET, EXTENDED RELEASE ORAL
Qty: 180 TABLET | Refills: 1 | OUTPATIENT
Start: 2024-10-21

## 2024-10-23 ENCOUNTER — TELEPHONE (OUTPATIENT)
Dept: INTERNAL MEDICINE | Facility: CLINIC | Age: 69
End: 2024-10-23

## 2024-10-23 DIAGNOSIS — M25.461 SWELLING OF JOINT OF RIGHT KNEE: Primary | ICD-10-CM

## 2024-10-23 NOTE — TELEPHONE ENCOUNTER
"Caller: Donta Ledesma Jr. \"Ed\"    Relationship: Self    Best call back number: [phone]     What orders are you requesting (i.e. lab or imaging): ULTRASOUND SOFT TISSUE     In what timeframe would the patient need to come in: ASAP    Where will you receive your lab/imaging services: [facility] IF POSSIBLE     Additional notes: PATIENT STATES HE ATTEMPTED TO SCHEDULE THE ULTRASOUND BUT WAS INFORMED THE ORDER WAS INCORRECT. SCHEDULING WILL BE SENDING THE ORDER BACK FOR CORRECTION.           "

## 2024-11-01 RX ORDER — BUPROPION HYDROCHLORIDE 300 MG/1
300 TABLET ORAL DAILY
Qty: 90 TABLET | Refills: 3 | Status: SHIPPED | OUTPATIENT
Start: 2024-11-01

## 2024-11-08 ENCOUNTER — HOSPITAL ENCOUNTER (OUTPATIENT)
Dept: ULTRASOUND IMAGING | Facility: HOSPITAL | Age: 69
Discharge: HOME | End: 2024-11-08
Payer: MEDICARE

## 2024-11-08 DIAGNOSIS — M25.461 SWELLING OF JOINT OF RIGHT KNEE: ICD-10-CM

## 2024-11-08 PROCEDURE — 76882 US LMTD JT/FCL EVL NVASC XTR: CPT

## 2024-11-12 ENCOUNTER — TELEPHONE (OUTPATIENT)
Dept: INTERNAL MEDICINE | Facility: CLINIC | Age: 69
End: 2024-11-12
Payer: MEDICARE

## 2024-11-12 NOTE — TELEPHONE ENCOUNTER
Pt called back. I read the message from Dr. Ortiz to the pt. He verbalized understanding.    He said he is doing well. The swelling has gone down but still swollen a little, he is not in any pain. He is keeping an eye on it.

## 2024-11-12 NOTE — TELEPHONE ENCOUNTER
[facility] for patient to return call     ----- Message from Josh Ortiz sent at 11/12/2024  1:40 PM EST -----  Please let him know the ultrasound of his knee shows what is probably a resolving hematoma.  It will likely clear up on its own.  How is he doing?

## 2025-01-22 ENCOUNTER — OFFICE VISIT (OUTPATIENT)
Dept: INTERNAL MEDICINE | Facility: CLINIC | Age: 70
End: 2025-01-22
Payer: MEDICARE

## 2025-01-22 ENCOUNTER — LAB (OUTPATIENT)
Dept: LAB | Facility: HOSPITAL | Age: 70
End: 2025-01-22
Payer: MEDICARE

## 2025-01-22 VITALS
HEIGHT: 74 IN | TEMPERATURE: 98.2 F | SYSTOLIC BLOOD PRESSURE: 136 MMHG | WEIGHT: 231.4 LBS | DIASTOLIC BLOOD PRESSURE: 76 MMHG | HEART RATE: 93 BPM | BODY MASS INDEX: 29.7 KG/M2 | OXYGEN SATURATION: 95 %

## 2025-01-22 DIAGNOSIS — R73.03 PREDIABETES: ICD-10-CM

## 2025-01-22 DIAGNOSIS — N18.32 STAGE 3B CHRONIC KIDNEY DISEASE: ICD-10-CM

## 2025-01-22 DIAGNOSIS — Z12.5 PROSTATE CANCER SCREENING: ICD-10-CM

## 2025-01-22 DIAGNOSIS — E78.2 MIXED HYPERLIPIDEMIA: ICD-10-CM

## 2025-01-22 DIAGNOSIS — I10 PRIMARY HYPERTENSION: Primary | ICD-10-CM

## 2025-01-22 LAB
ALBUMIN SERPL-MCNC: 4.1 G/DL (ref 3.5–5.2)
ALBUMIN/GLOB SERPL: 1.2 G/DL
ALP SERPL-CCNC: 129 U/L (ref 39–117)
ALT SERPL W P-5'-P-CCNC: 27 U/L (ref 1–41)
ANION GAP SERPL CALCULATED.3IONS-SCNC: 13 MMOL/L (ref 5–15)
AST SERPL-CCNC: 23 U/L (ref 1–40)
BASOPHILS # BLD AUTO: 0.05 10*3/MM3 (ref 0–0.2)
BASOPHILS NFR BLD AUTO: 1.1 % (ref 0–1.5)
BILIRUB SERPL-MCNC: 0.4 MG/DL (ref 0–1.2)
BUN SERPL-MCNC: 29 MG/DL (ref 8–23)
BUN/CREAT SERPL: 11.7 (ref 7–25)
CALCIUM SPEC-SCNC: 9 MG/DL (ref 8.6–10.5)
CHLORIDE SERPL-SCNC: 105 MMOL/L (ref 98–107)
CHOLEST SERPL-MCNC: 169 MG/DL (ref 0–200)
CO2 SERPL-SCNC: 23 MMOL/L (ref 22–29)
CREAT SERPL-MCNC: 2.47 MG/DL (ref 0.76–1.27)
DEPRECATED RDW RBC AUTO: 42.9 FL (ref 37–54)
EGFRCR SERPLBLD CKD-EPI 2021: 27.4 ML/MIN/1.73
EOSINOPHIL # BLD AUTO: 0.17 10*3/MM3 (ref 0–0.4)
EOSINOPHIL NFR BLD AUTO: 3.7 % (ref 0.3–6.2)
ERYTHROCYTE [DISTWIDTH] IN BLOOD BY AUTOMATED COUNT: 12.7 % (ref 12.3–15.4)
GLOBULIN UR ELPH-MCNC: 3.3 GM/DL
GLUCOSE SERPL-MCNC: 92 MG/DL (ref 65–99)
HBA1C MFR BLD: 5.5 % (ref 4.8–5.6)
HCT VFR BLD AUTO: 43.6 % (ref 37.5–51)
HDLC SERPL-MCNC: 42 MG/DL (ref 40–60)
HGB BLD-MCNC: 14.6 G/DL (ref 13–17.7)
IMM GRANULOCYTES # BLD AUTO: 0.01 10*3/MM3 (ref 0–0.05)
IMM GRANULOCYTES NFR BLD AUTO: 0.2 % (ref 0–0.5)
LDLC SERPL CALC-MCNC: 104 MG/DL (ref 0–100)
LDLC/HDLC SERPL: 2.4 {RATIO}
LYMPHOCYTES # BLD AUTO: 1.34 10*3/MM3 (ref 0.7–3.1)
LYMPHOCYTES NFR BLD AUTO: 28.8 % (ref 19.6–45.3)
MCH RBC QN AUTO: 30.7 PG (ref 26.6–33)
MCHC RBC AUTO-ENTMCNC: 33.5 G/DL (ref 31.5–35.7)
MCV RBC AUTO: 91.8 FL (ref 79–97)
MONOCYTES # BLD AUTO: 0.43 10*3/MM3 (ref 0.1–0.9)
MONOCYTES NFR BLD AUTO: 9.2 % (ref 5–12)
NEUTROPHILS NFR BLD AUTO: 2.65 10*3/MM3 (ref 1.7–7)
NEUTROPHILS NFR BLD AUTO: 57 % (ref 42.7–76)
NRBC BLD AUTO-RTO: 0 /100 WBC (ref 0–0.2)
PLATELET # BLD AUTO: 226 10*3/MM3 (ref 140–450)
PMV BLD AUTO: 9 FL (ref 6–12)
POTASSIUM SERPL-SCNC: 4 MMOL/L (ref 3.5–5.2)
PROT SERPL-MCNC: 7.4 G/DL (ref 6–8.5)
PSA SERPL-MCNC: 6.71 NG/ML (ref 0–4)
RBC # BLD AUTO: 4.75 10*6/MM3 (ref 4.14–5.8)
SODIUM SERPL-SCNC: 141 MMOL/L (ref 136–145)
TRIGL SERPL-MCNC: 131 MG/DL (ref 0–150)
VLDLC SERPL-MCNC: 23 MG/DL (ref 5–40)
WBC NRBC COR # BLD AUTO: 4.65 10*3/MM3 (ref 3.4–10.8)

## 2025-01-22 PROCEDURE — 3078F DIAST BP <80 MM HG: CPT | Performed by: INTERNAL MEDICINE

## 2025-01-22 PROCEDURE — G0103 PSA SCREENING: HCPCS

## 2025-01-22 PROCEDURE — 80061 LIPID PANEL: CPT

## 2025-01-22 PROCEDURE — 1160F RVW MEDS BY RX/DR IN RCRD: CPT | Performed by: INTERNAL MEDICINE

## 2025-01-22 PROCEDURE — 83036 HEMOGLOBIN GLYCOSYLATED A1C: CPT

## 2025-01-22 PROCEDURE — G2211 COMPLEX E/M VISIT ADD ON: HCPCS | Performed by: INTERNAL MEDICINE

## 2025-01-22 PROCEDURE — 85025 COMPLETE CBC W/AUTO DIFF WBC: CPT

## 2025-01-22 PROCEDURE — 80053 COMPREHEN METABOLIC PANEL: CPT

## 2025-01-22 PROCEDURE — 3075F SYST BP GE 130 - 139MM HG: CPT | Performed by: INTERNAL MEDICINE

## 2025-01-22 PROCEDURE — 99214 OFFICE O/P EST MOD 30 MIN: CPT | Performed by: INTERNAL MEDICINE

## 2025-01-22 PROCEDURE — 1159F MED LIST DOCD IN RCRD: CPT | Performed by: INTERNAL MEDICINE

## 2025-01-22 PROCEDURE — 1126F AMNT PAIN NOTED NONE PRSNT: CPT | Performed by: INTERNAL MEDICINE

## 2025-01-22 NOTE — PROGRESS NOTES
[facility]     Donta Ledesma Jr.  1955   [id number]      Patient Care Team:  Josh Ortiz MD as PCP - General (Internal Medicine)  Shankar Woodward MD as Consulting Physician (Urology)  Ryan Lopez MD as Consulting Physician (Gastroenterology)  Darryl Hayes MD as Consulting Physician (Ophthalmology)  Aj Jerome APRN as Nurse Practitioner (Pulmonary Disease)    Chief Complaint::   Chief Complaint   Patient presents with    Hyperlipidemia    Hypertension        HPI  History of Present Illness  The patient is a 69-year-old male who comes in for follow-up of hypertension, prediabetes, hyperlipidemia, and chronic kidney disease.    He reports satisfactory blood pressure readings, typically ranging between 125 to 135 systolic and in the 70s diastolic. He monitors his blood pressure regularly and occasionally sends printouts to his physician. He notes that his blood pressure readings are occasionally elevated.    He has been experiencing persistent hunger, which he is uncertain if it is related to his medical conditions. Despite this, he reports no changes in his sense of taste. He expresses a desire to lose approximately 15 to 20 pounds. He does not consume excessive salt and maintains a low-sodium diet. He has gained some weight recently.    He had a consultation with Dr. Matta a few weeks prior, during which his creatinine levels were found to be improved. His creatinine level was 2.6 at the time of his surgery and remained the same a month later. However, his nephrologist anticipated a slight improvement over time, which was confirmed by a subsequent test showing a creatinine level of 2.45. He underwent a nephrectomy due to a cyst, which was not cancerous. The procedure was initially attempted laparoscopically but required conversion to an open approach, resulting in a scar that remains tender. He was advised that complete healing may take some time. He was  informed that he could potentially live the rest of his life without requiring dialysis. He was advised to monitor his salt intake, control his blood pressure, and lose weight. He has been able to continue his activities, including skiing and golfing, and spends time with his grandchildren. He is scheduled for blood work today.    FAMILY HISTORY  His mother is still alive at 91 years old.    MEDICATIONS  Amlodipine, atorvastatin.      Chronic Conditions:      Patient Active Problem List   Diagnosis    Irritable bowel syndrome    Annual physical exam    Mixed hyperlipidemia    Primary hypertension    Chronic kidney disease, stage III (moderate)    Sleep apnea    Disc disorder of cervical region    Prediabetes    Major depressive disorder with single episode, in full remission        Past Medical History:   Diagnosis Date    Allergic     Biaxin    Back problem     Benign prostate hyperplasia     Glaucoma     Hypertension     Nephrolithiasis     CRISTINE (obstructive sleep apnea)     Renal insufficiency        Past Surgical History:   Procedure Laterality Date    COLONOSCOPY      Multiple times    EYE SURGERY      Lasix    WISDOM TOOTH EXTRACTION         Family History   Problem Relation Age of Onset    Hypertension Mother     Cancer Mother     Diabetes Father     Cancer Father     Hypertension Brother     Cancer Maternal Grandfather     Coronary artery disease Paternal Grandmother     Stroke Paternal Grandfather        Social History     Socioeconomic History    Marital status:    Tobacco Use    Smoking status: Never     Passive exposure: Never    Smokeless tobacco: Never   Vaping Use    Vaping status: Never Used   Substance and Sexual Activity    Alcohol use: Never    Drug use: Never    Sexual activity: Not Currently     Partners: Female       Allergies   Allergen Reactions    Erythromycin Unknown - Low Severity    Clarithromycin Other (See Comments)     Unknown;  BIAXIN         Current Outpatient Medications:      "amLODIPine (NORVASC) 10 MG tablet, Take 1 tablet by mouth Daily., Disp: 90 tablet, Rfl: 3    atorvastatin (LIPITOR) 20 MG tablet, TAKE 1 TABLET BY MOUTH DAILY, Disp: 90 tablet, Rfl: 3    buPROPion XL (WELLBUTRIN XL) 300 MG 24 hr tablet, TAKE 1 TABLET BY MOUTH DAILY, Disp: 90 tablet, Rfl: 3    Cholecalciferol (Vitamin D3) 50 MCG (2000 UT) tablet, Take 1 tablet by mouth 4 (Four) Times a Week., Disp: , Rfl:     sertraline (ZOLOFT) 25 MG tablet, TAKE 1 TABLET BY MOUTH DAILY, Disp: 90 tablet, Rfl: 3    doxazosin (CARDURA) 4 MG tablet, Take 1 tablet by mouth Daily., Disp: , Rfl:     Review of Systems     Vital Signs  Vitals:    01/22/25 1130   BP: 136/76   BP Location: Left arm   Patient Position: Sitting   Cuff Size: Adult   Pulse: 93   Temp: 98.2 °F (36.8 °C)   TempSrc: Infrared   SpO2: 95%   Weight: 105 kg (231 lb 6.4 oz)   Height: 187.3 cm (73.74\")   PainSc: 0-No pain       Physical Exam  Vitals reviewed.   Constitutional:       Appearance: Normal appearance. He is well-developed.   HENT:      Head: Normocephalic and atraumatic.      Right Ear: Tympanic membrane and ear canal normal.      Left Ear: Tympanic membrane and ear canal normal.      Mouth/Throat:      Pharynx: Oropharynx is clear. No posterior oropharyngeal erythema.   Eyes:      Extraocular Movements: Extraocular movements intact.      Pupils: Pupils are equal, round, and reactive to light.   Neck:      Thyroid: No thyromegaly.      Vascular: No carotid bruit.   Cardiovascular:      Rate and Rhythm: Normal rate and regular rhythm.      Heart sounds: Normal heart sounds. No murmur heard.     No friction rub. No gallop.   Pulmonary:      Effort: Pulmonary effort is normal.      Breath sounds: Normal breath sounds.   Abdominal:      General: Bowel sounds are normal.      Palpations: Abdomen is soft.      Tenderness: There is no abdominal tenderness.   Musculoskeletal:      Cervical back: Normal range of motion and neck supple.      Right lower leg: No edema. "      Left lower leg: No edema.   Lymphadenopathy:      Cervical: No cervical adenopathy.   Skin:     General: Skin is warm and dry.   Neurological:      Mental Status: He is alert and oriented to person, place, and time.      Cranial Nerves: No cranial nerve deficit.   Psychiatric:         Mood and Affect: Mood normal.         Behavior: Behavior normal.        Physical Exam      Procedures    ACE III MINI        Results  Laboratory Studies  Creatinine was 2.6, then improved to 2.45.           Assessment/Plan:    Diagnoses and all orders for this visit:    1. Primary hypertension (Primary)    2. Prediabetes  -     Hemoglobin A1c; Future    3. Mixed hyperlipidemia  -     Comprehensive Metabolic Panel; Future  -     Lipid Panel; Future    4. Stage 3b chronic kidney disease  -     CBC & Differential; Future    5. Prostate cancer screening  -     PSA Screen; Future      Assessment & Plan  1. Hypertension.  His blood pressure is well-regulated with the current amlodipine regimen.    2. Prediabetes.  His A1c results are pending. He is advised to persist in maintaining a healthy diet and continue efforts towards weight loss.    3. Hyperlipidemia.  He is advised to continue with the atorvastatin medication and maintain a healthy diet.    4. Chronic kidney disease, status post nephrectomy.  He is under the close supervision of nephrology and urology specialists.    Follow-up  The patient will follow up in 6 months.    PROCEDURE  The patient underwent a nephrectomy due to a cyst, which was not cancerous. The procedure was initially attempted laparoscopically but required conversion to an open approach.      Plan of care reviewed with patient at the conclusion of today's visit. Education was provided regarding diagnosis, management, and any prescribed or recommended OTC medications.Patient verbalizes understanding of and agreement with management plan.     Patient or patient representative verbalized consent for the use of  Ambient Listening during the visit with  Josh Ortiz MD for chart documentation. 1/28/2025  20:40 EST        Josh Ortiz MD

## 2025-01-28 ENCOUNTER — OFFICE VISIT (OUTPATIENT)
Dept: SLEEP MEDICINE | Age: 70
End: 2025-01-28
Payer: MEDICARE

## 2025-01-28 VITALS
OXYGEN SATURATION: 96 % | TEMPERATURE: 98.6 F | WEIGHT: 234 LBS | SYSTOLIC BLOOD PRESSURE: 122 MMHG | BODY MASS INDEX: 31.01 KG/M2 | DIASTOLIC BLOOD PRESSURE: 60 MMHG | HEART RATE: 97 BPM | HEIGHT: 73 IN

## 2025-01-28 DIAGNOSIS — E66.3 OVERWEIGHT (BMI 25.0-29.9): ICD-10-CM

## 2025-01-28 DIAGNOSIS — G47.33 OSA (OBSTRUCTIVE SLEEP APNEA): Primary | ICD-10-CM

## 2025-01-28 RX ORDER — DOXAZOSIN 4 MG/1
1 TABLET ORAL DAILY
COMMUNITY

## 2025-01-28 NOTE — PROGRESS NOTES
Sleep Clinic Follow Up Note    Chief Complaint  Follow-up and Sleep Apnea    Subjective     History of Present Illness (from previous encounter on 1/24/2024):  Donta Ledesma Jr. is a 68 y.o. male who returns for follow-up and compliance of PAP therapy.  The Pap report has been reviewed.  Overall usage and compliance are at 98.9%.  Patient averages 7 hours 52 minutes.  Sleep apnea is well-controlled with an AHI of 2.9/H. I will refill the patient's supplies, and I have asked them to return for follow-up and compliance in 1 year or sooner should they have further questions or concerns. (End copied text).    Interval History:  Donta Ledesma Jr. is a 69 y.o. male returns for follow up and compliance of PAP therapy. The patient was last seen on 1/24/2024 by me. Overall the patient feels good with regard to therapy. The device appears to be working appropriately. On average the patient sleeps 8 hours per night. The patient wakes 2 times per night.     The patient reports the following changes to their medical and medication history since they were last seen:  Nephrectomy    Further details are as follows:      [place] Scale is (out of 24): Total score: 4     Weight:  Current Weight: 234 lb    Weight change in the last year:  gain: 0 lbs    The patient's relevant past medical, surgical, family, and social history reviewed and updated in Epic as appropriate.    PMH:    Past Medical History:   Diagnosis Date    Allergic     Biaxin    Back problem     Benign prostate hyperplasia     Glaucoma     Hypertension     Nephrolithiasis     CRISTINE (obstructive sleep apnea)     Renal insufficiency      Past Surgical History:   Procedure Laterality Date    COLONOSCOPY      Multiple times    EYE SURGERY      Lasix    WISDOM TOOTH EXTRACTION         Allergies   Allergen Reactions    Erythromycin Unknown - Low Severity    Clarithromycin Other (See Comments)     Unknown;  BIAXIN       MEDS:  Prior to Admission medications    Medication Sig  "Start Date End Date Taking? Authorizing Provider   amLODIPine (NORVASC) 10 MG tablet Take 1 tablet by mouth Daily. 10/3/24   Bhavya Davidson MD   atorvastatin (LIPITOR) 20 MG tablet TAKE 1 TABLET BY MOUTH DAILY 2/26/24   Josh Ortiz MD   buPROPion XL (WELLBUTRIN XL) 300 MG 24 hr tablet TAKE 1 TABLET BY MOUTH DAILY 11/1/24   Josh Ortiz MD   Cholecalciferol (Vitamin D3) 50 MCG (2000 UT) tablet Take 1 tablet by mouth 4 (Four) Times a Week.    Provider, MD Alberto   doxycycline (MONODOX) 100 MG capsule Take 1 capsule by mouth 2 (Two) Times a Day. 7/5/24   Oleg Green APRN   ondansetron ODT (ZOFRAN-ODT) 4 MG disintegrating tablet Place 1 tablet on the tongue Every 8 (Eight) Hours As Needed for Nausea or Vomiting. 7/12/24   Josh Ortiz MD   sertraline (ZOLOFT) 25 MG tablet TAKE 1 TABLET BY MOUTH DAILY 6/27/24   Josh Ortiz MD         FH:  Family History   Problem Relation Age of Onset    Hypertension Mother     Cancer Mother     Diabetes Father     Cancer Father     Hypertension Brother     Cancer Maternal Grandfather     Coronary artery disease Paternal Grandmother     Stroke Paternal Grandfather        Objective   Vital Signs:  /60   Pulse 97   Temp 98.6 °F (37 °C) (Oral)   Ht 185.4 cm (73\")   Wt 106 kg (234 lb)   SpO2 96%   BMI 30.87 kg/m²     Patient's (Body mass index is 30.87 kg/m².) indicates that they are obese (BMI >30) with health related conditions that include obstructive sleep apnea . Weight is unchanged. BMI is is above average; BMI management plan is completed. We discussed portion control and increasing exercise.            Physical Exam  Vitals reviewed.   Constitutional:       Appearance: Normal appearance.   HENT:      Head: Normocephalic and atraumatic.      Nose: Nose normal.      Mouth/Throat:      Mouth: Mucous membranes are moist.   Cardiovascular:      Rate and Rhythm: Normal rate and regular rhythm.      Heart sounds: No murmur " heard.     No friction rub. No gallop.   Pulmonary:      Effort: Pulmonary effort is normal. No respiratory distress.      Breath sounds: Normal breath sounds. No wheezing or rhonchi.   Neurological:      Mental Status: He is alert and oriented to person, place, and time.   Psychiatric:         Behavior: Behavior normal.             Result Review :           PAP Report:  AHI: 2.1/h  Days of Usage: 30/30 (100%)  Number of Days Greater than 4 hours: 30/30 (100%)  Average time (days used): 8 hours 13 minutes  90th Percentile Pressure: 10.5 cmH2O  Settings: Auto CPAP-a flex-/20 cm H2O, a flex-2, ramp time 30 minutes, ramp start pressure 4 cm H2O.       Assessment and Plan  Donta Ledesma Jr. is a 69 y.o. male who returns for follow-up and compliance of PAP therapy.  The Pap report has been reviewed.  Overall usage and compliance are at 100%.  The patient averages 8 hours and 13 minutes of therapy.  Sleep apnea is well-controlled with an AHI of 2.1/h. I will refill the patient's supplies, and I have asked them to return for follow-up and compliance in 1 year or sooner should they have further questions or concerns.    Diagnoses and all orders for this visit:    1. CRISTINE (obstructive sleep apnea) (Primary)  -     PAP Therapy    2. Overweight (BMI 25.0-29.9)         The patient continues to use and benefit from PAP therapy.    1. The patient was counseled regarding multimodal approach with healthy nutrition, healthy sleep, regular physical activity, social activities, counseling, and medications. Encouraged to practice lateral sleep position. Avoid alcohol and sedatives close to bedtime.     2.  We will refill supplies x1 year.  Return to clinic 1 year or sooner if symptoms warrant. I have reviewed the results of my evaluation and impression and discussed my recommendations in detail with the patient.           Follow Up  Return in about 1 year (around 1/28/2026) for Annual visit.  Patient was given instructions and  counseling regarding his condition or for health maintenance advice. Please see specific information pulled into the AVS if appropriate.       PERFECTO Sauceda, ACNP-BC  Pulmonology, Critical Care, and Sleep Medicine

## 2025-02-19 RX ORDER — ATORVASTATIN CALCIUM 20 MG/1
20 TABLET, FILM COATED ORAL DAILY
Qty: 90 TABLET | Refills: 3 | Status: SHIPPED | OUTPATIENT
Start: 2025-02-19

## 2025-02-19 NOTE — TELEPHONE ENCOUNTER
Rx Refill Note  Requested Prescriptions     Pending Prescriptions Disp Refills    atorvastatin (LIPITOR) 20 MG tablet [Pharmacy Med Name: ATORVASTATIN 20MG TABLETS] 90 tablet 3     Sig: TAKE 1 TABLET BY MOUTH DAILY      Last office visit with prescribing clinician: 1/22/2025   Last telemedicine visit with prescribing clinician: Visit date not found   Next office visit with prescribing clinician: 7/16/2025                         Would you like a call back once the refill request has been completed: [] Yes [] No    If the office needs to give you a call back, can they leave a voicemail: [] Yes [] No    Komal Shah MA  02/19/25, 11:35 EST

## 2025-05-19 ENCOUNTER — TELEPHONE (OUTPATIENT)
Dept: INTERNAL MEDICINE | Facility: CLINIC | Age: 70
End: 2025-05-19
Payer: MEDICARE

## 2025-05-19 ENCOUNTER — HOSPITAL ENCOUNTER (EMERGENCY)
Facility: HOSPITAL | Age: 70
Discharge: HOME | End: 2025-05-19
Attending: EMERGENCY MEDICINE | Admitting: EMERGENCY MEDICINE
Payer: MEDICARE

## 2025-05-19 ENCOUNTER — APPOINTMENT (OUTPATIENT)
Dept: CT IMAGING | Facility: HOSPITAL | Age: 70
End: 2025-05-19
Payer: MEDICARE

## 2025-05-19 VITALS
HEIGHT: 74 IN | TEMPERATURE: 98.1 F | RESPIRATION RATE: 16 BRPM | DIASTOLIC BLOOD PRESSURE: 75 MMHG | WEIGHT: 225 LBS | OXYGEN SATURATION: 95 % | HEART RATE: 80 BPM | BODY MASS INDEX: 28.88 KG/M2 | SYSTOLIC BLOOD PRESSURE: 143 MMHG

## 2025-05-19 DIAGNOSIS — R11.0 NAUSEA: ICD-10-CM

## 2025-05-19 DIAGNOSIS — K29.00 ACUTE SUPERFICIAL GASTRITIS WITHOUT HEMORRHAGE: Primary | ICD-10-CM

## 2025-05-19 DIAGNOSIS — I10 PRIMARY HYPERTENSION: ICD-10-CM

## 2025-05-19 DIAGNOSIS — N18.32 STAGE 3B CHRONIC KIDNEY DISEASE: ICD-10-CM

## 2025-05-19 DIAGNOSIS — R10.13 DYSPEPSIA: ICD-10-CM

## 2025-05-19 DIAGNOSIS — K57.90 DIVERTICULOSIS: ICD-10-CM

## 2025-05-19 LAB
ALBUMIN SERPL-MCNC: 4.2 G/DL (ref 3.5–5.2)
ALBUMIN/GLOB SERPL: 1.2 G/DL
ALP SERPL-CCNC: 137 U/L (ref 39–117)
ALT SERPL W P-5'-P-CCNC: 14 U/L (ref 1–41)
ANION GAP SERPL CALCULATED.3IONS-SCNC: 12 MMOL/L (ref 5–15)
AST SERPL-CCNC: 19 U/L (ref 1–40)
BACTERIA UR QL AUTO: ABNORMAL /HPF
BASOPHILS # BLD AUTO: 0.07 10*3/MM3 (ref 0–0.2)
BASOPHILS NFR BLD AUTO: 0.9 % (ref 0–1.5)
BILIRUB SERPL-MCNC: 0.7 MG/DL (ref 0–1.2)
BILIRUB UR QL STRIP: NEGATIVE
BUN SERPL-MCNC: 27 MG/DL (ref 8–23)
BUN/CREAT SERPL: 10.8 (ref 7–25)
CALCIUM SPEC-SCNC: 9.6 MG/DL (ref 8.6–10.5)
CHLORIDE SERPL-SCNC: 104 MMOL/L (ref 98–107)
CLARITY UR: CLEAR
CO2 SERPL-SCNC: 23 MMOL/L (ref 22–29)
COLOR UR: YELLOW
CREAT SERPL-MCNC: 2.5 MG/DL (ref 0.76–1.27)
D-LACTATE SERPL-SCNC: 1.2 MMOL/L (ref 0.5–2)
DEPRECATED RDW RBC AUTO: 43.1 FL (ref 37–54)
EGFRCR SERPLBLD CKD-EPI 2021: 27 ML/MIN/1.73
EOSINOPHIL # BLD AUTO: 0.09 10*3/MM3 (ref 0–0.4)
EOSINOPHIL NFR BLD AUTO: 1.1 % (ref 0.3–6.2)
ERYTHROCYTE [DISTWIDTH] IN BLOOD BY AUTOMATED COUNT: 13 % (ref 12.3–15.4)
GLOBULIN UR ELPH-MCNC: 3.5 GM/DL
GLUCOSE SERPL-MCNC: 112 MG/DL (ref 65–99)
GLUCOSE UR STRIP-MCNC: NEGATIVE MG/DL
HCT VFR BLD AUTO: 42.9 % (ref 37.5–51)
HGB BLD-MCNC: 14.3 G/DL (ref 13–17.7)
HGB UR QL STRIP.AUTO: NEGATIVE
HOLD SPECIMEN: NORMAL
HYALINE CASTS UR QL AUTO: ABNORMAL /LPF
IMM GRANULOCYTES # BLD AUTO: 0.02 10*3/MM3 (ref 0–0.05)
IMM GRANULOCYTES NFR BLD AUTO: 0.3 % (ref 0–0.5)
KETONES UR QL STRIP: ABNORMAL
LEUKOCYTE ESTERASE UR QL STRIP.AUTO: ABNORMAL
LIPASE SERPL-CCNC: 48 U/L (ref 13–60)
LYMPHOCYTES # BLD AUTO: 1.62 10*3/MM3 (ref 0.7–3.1)
LYMPHOCYTES NFR BLD AUTO: 20.3 % (ref 19.6–45.3)
MCH RBC QN AUTO: 29.9 PG (ref 26.6–33)
MCHC RBC AUTO-ENTMCNC: 33.3 G/DL (ref 31.5–35.7)
MCV RBC AUTO: 89.6 FL (ref 79–97)
MONOCYTES # BLD AUTO: 0.69 10*3/MM3 (ref 0.1–0.9)
MONOCYTES NFR BLD AUTO: 8.6 % (ref 5–12)
NEUTROPHILS NFR BLD AUTO: 5.51 10*3/MM3 (ref 1.7–7)
NEUTROPHILS NFR BLD AUTO: 68.8 % (ref 42.7–76)
NITRITE UR QL STRIP: NEGATIVE
NRBC BLD AUTO-RTO: 0 /100 WBC (ref 0–0.2)
PH UR STRIP.AUTO: 5.5 [PH] (ref 5–8)
PLATELET # BLD AUTO: 211 10*3/MM3 (ref 140–450)
PMV BLD AUTO: 9.1 FL (ref 6–12)
POTASSIUM SERPL-SCNC: 3.9 MMOL/L (ref 3.5–5.2)
PROT SERPL-MCNC: 7.7 G/DL (ref 6–8.5)
PROT UR QL STRIP: NEGATIVE
RBC # BLD AUTO: 4.79 10*6/MM3 (ref 4.14–5.8)
RBC # UR STRIP: ABNORMAL /HPF
REF LAB TEST METHOD: ABNORMAL
SODIUM SERPL-SCNC: 139 MMOL/L (ref 136–145)
SP GR UR STRIP: 1.02 (ref 1–1.03)
SQUAMOUS #/AREA URNS HPF: ABNORMAL /HPF
UROBILINOGEN UR QL STRIP: ABNORMAL
WBC # UR STRIP: ABNORMAL /HPF
WBC NRBC COR # BLD AUTO: 8 10*3/MM3 (ref 3.4–10.8)
WHOLE BLOOD HOLD COAG: NORMAL
WHOLE BLOOD HOLD SPECIMEN: NORMAL

## 2025-05-19 PROCEDURE — 83605 ASSAY OF LACTIC ACID: CPT | Performed by: EMERGENCY MEDICINE

## 2025-05-19 PROCEDURE — 36415 COLL VENOUS BLD VENIPUNCTURE: CPT

## 2025-05-19 PROCEDURE — 96375 TX/PRO/DX INJ NEW DRUG ADDON: CPT

## 2025-05-19 PROCEDURE — 81001 URINALYSIS AUTO W/SCOPE: CPT | Performed by: EMERGENCY MEDICINE

## 2025-05-19 PROCEDURE — 25810000003 SODIUM CHLORIDE 0.9 % SOLUTION: Performed by: EMERGENCY MEDICINE

## 2025-05-19 PROCEDURE — 85025 COMPLETE CBC W/AUTO DIFF WBC: CPT | Performed by: EMERGENCY MEDICINE

## 2025-05-19 PROCEDURE — 96374 THER/PROPH/DIAG INJ IV PUSH: CPT

## 2025-05-19 PROCEDURE — 25010000002 FAMOTIDINE 10 MG/ML SOLUTION: Performed by: EMERGENCY MEDICINE

## 2025-05-19 PROCEDURE — 25010000002 PROCHLORPERAZINE 10 MG/2ML SOLUTION: Performed by: EMERGENCY MEDICINE

## 2025-05-19 PROCEDURE — 25010000002 ONDANSETRON PER 1 MG: Performed by: EMERGENCY MEDICINE

## 2025-05-19 PROCEDURE — 83690 ASSAY OF LIPASE: CPT | Performed by: EMERGENCY MEDICINE

## 2025-05-19 PROCEDURE — 99284 EMERGENCY DEPT VISIT MOD MDM: CPT

## 2025-05-19 PROCEDURE — 74176 CT ABD & PELVIS W/O CONTRAST: CPT

## 2025-05-19 PROCEDURE — 80053 COMPREHEN METABOLIC PANEL: CPT | Performed by: EMERGENCY MEDICINE

## 2025-05-19 RX ORDER — FAMOTIDINE 10 MG/ML
20 INJECTION, SOLUTION INTRAVENOUS ONCE
Status: COMPLETED | OUTPATIENT
Start: 2025-05-19 | End: 2025-05-19

## 2025-05-19 RX ORDER — ONDANSETRON 4 MG/1
4 TABLET, ORALLY DISINTEGRATING ORAL EVERY 8 HOURS PRN
Qty: 15 TABLET | Refills: 0 | Status: SHIPPED | OUTPATIENT
Start: 2025-05-19 | End: 2025-05-24

## 2025-05-19 RX ORDER — ONDANSETRON 2 MG/ML
4 INJECTION INTRAMUSCULAR; INTRAVENOUS ONCE
Status: COMPLETED | OUTPATIENT
Start: 2025-05-19 | End: 2025-05-19

## 2025-05-19 RX ORDER — PROCHLORPERAZINE EDISYLATE 5 MG/ML
10 INJECTION INTRAMUSCULAR; INTRAVENOUS ONCE
Status: COMPLETED | OUTPATIENT
Start: 2025-05-19 | End: 2025-05-19

## 2025-05-19 RX ORDER — SODIUM CHLORIDE 9 MG/ML
10 INJECTION, SOLUTION INTRAMUSCULAR; INTRAVENOUS; SUBCUTANEOUS AS NEEDED
Status: DISCONTINUED | OUTPATIENT
Start: 2025-05-19 | End: 2025-05-19 | Stop reason: HOSPADM

## 2025-05-19 RX ADMIN — FAMOTIDINE 20 MG: 10 INJECTION, SOLUTION INTRAVENOUS at 04:37

## 2025-05-19 RX ADMIN — ONDANSETRON 4 MG: 2 INJECTION INTRAMUSCULAR; INTRAVENOUS at 04:37

## 2025-05-19 RX ADMIN — SODIUM CHLORIDE 500 ML: 9 INJECTION, SOLUTION INTRAVENOUS at 04:37

## 2025-05-19 RX ADMIN — PROCHLORPERAZINE EDISYLATE 10 MG: 5 INJECTION INTRAMUSCULAR; INTRAVENOUS at 05:28

## 2025-05-19 NOTE — ED PROVIDER NOTES
Subjective   History of Present Illness  This is a 69-year-old male with past medical history of chronic kidney disease presenting to the emergency department with some abdominal pain and nausea.  Patient had the symptoms for the last 4 days.  He states that he started getting some discomfort in his mid abdomen.  Felt like he was bloated.  He became very nauseous.  Patient states that he has been nauseous since then.  He has not had any vomiting.  Also had a couple episodes of loose stool.  Denies any fevers or chills.  No headache or change in vision.  No focal weakness.  No chest pain or shortness of breath    History provided by:  Patient   used: No        Review of Systems   Constitutional:  Negative for chills and fever.   HENT:  Negative for congestion, ear pain and sore throat.    Eyes:  Negative for visual disturbance.   Respiratory:  Negative for shortness of breath.    Cardiovascular:  Negative for chest pain.   Gastrointestinal:  Positive for abdominal pain and nausea.   Genitourinary:  Negative for difficulty urinating.   Musculoskeletal:  Negative for arthralgias.   Skin:  Negative for rash.   Neurological:  Negative for dizziness, weakness and numbness.   Psychiatric/Behavioral:  Negative for agitation.        Past Medical History:   Diagnosis Date    Allergic     Biaxin    Back problem     Benign prostate hyperplasia     Glaucoma     Hypertension     Nephrolithiasis     CRISTINE (obstructive sleep apnea)     Renal insufficiency        Allergies   Allergen Reactions    Erythromycin Unknown - Low Severity    Clarithromycin Other (See Comments)     Unknown;  BIAXIN       Past Surgical History:   Procedure Laterality Date    COLONOSCOPY      Multiple times    EYE SURGERY      Lasix    WISDOM TOOTH EXTRACTION         Family History   Problem Relation Age of Onset    Hypertension Mother     Cancer Mother     Diabetes Father     Cancer Father     Hypertension Brother     Cancer Maternal  Grandfather     Coronary artery disease Paternal Grandmother     Stroke Paternal Grandfather        Social History     Socioeconomic History    Marital status:    Tobacco Use    Smoking status: Never     Passive exposure: Never    Smokeless tobacco: Never   Vaping Use    Vaping status: Never Used   Substance and Sexual Activity    Alcohol use: Never    Drug use: Never    Sexual activity: Not Currently     Partners: Female           Objective   Physical Exam  Vitals and nursing note reviewed.   Constitutional:       General: He is not in acute distress.     Appearance: He is not ill-appearing or toxic-appearing.   HENT:      Mouth/Throat:      Pharynx: No posterior oropharyngeal erythema.   Eyes:      Extraocular Movements: Extraocular movements intact.      Pupils: Pupils are equal, round, and reactive to light.   Cardiovascular:      Rate and Rhythm: Regular rhythm. Tachycardia present.   Pulmonary:      Effort: Pulmonary effort is normal. No respiratory distress.   Abdominal:      General: Abdomen is flat. There is no distension.      Palpations: There is no mass.      Tenderness: There is generalized abdominal tenderness. There is no guarding or rebound.   Musculoskeletal:         General: No deformity. Normal range of motion.   Neurological:      General: No focal deficit present.      Mental Status: He is alert.      Sensory: No sensory deficit.      Motor: No weakness.         Procedures           ED Course  ED Course as of 05/19/25 0541   Mon May 19, 2025   0410 BP: 142/88 [JK]   0410 Temp: 98.1 °F (36.7 °C) [JK]   0410 Heart Rate: 104 [JK]   0410 Resp: 16 [JK]   0410 SpO2: 95 %  Interpretation:  Patient's repeat vitals, telemetry tracing, and pulse oximetry tracing were directly viewed and interpreted by myself.   O2 sat 95% on room air, interpreted as normal.  Telemetry rhythm strip revealed a rate of 104 bpm, interpreted as sinus tachycardia [JK]   0411 CBC & Differential [JK]   0411 Comprehensive  Metabolic Panel(!) [JK]   0411 Lipase [JK]   0411 Lactic Acid, Plasma [JK]   0411 Urinalysis, Microscopic Only - Urine, Clean Catch(!) [JK]   0411 Urinalysis With Microscopic If Indicated (No Culture) - Urine, Clean Catch(!)  Interpretation:  Laboratory studies were reviewed and interpreted directly by myself.  CBC was normal, CMP showed some chronic kidney disease with BUN of 27 creatinine 2.5, urinalysis unremarkable, lactic normal, lipase normal [JK]   0411 CT Abdomen Pelvis Without Contrast  Interpretation:  Imaging was directly visualized by myself, per my interpretations, CT abdomen pelvis showed some diverticulosis and history of right nephrectomy. [JK]   0539 On reevaluation, the patient is feeling much better.  Nausea is improved.  He is tolerating oral intake.  Repeat abdominal examination benign.  Findings consistent with acute gastritis.  Patient will be discharged on short course antiemetics.  He is to follow-up with PCP in 48 hours.  Given strict return precautions.  Verbalized understanding. [JK]   0540 I had a discussion with the patient/family regarding diagnosis, diagnostic results, treatment plan, and medications. The patient/family indicated understanding of these instructions. I spent adequate time at the bedside prior to discharge necessary to discuss the aftercare instructions, giving patient education, providing explanations of the results of our evaluations/findings, and my decision making to assure that the patient/family understand the plan of care. Time was allotted to answer questions at that time and throughout the ED course. Patient is required to maintain timely follow up, as discussed. I also discussed the potential for the development of an acute emergent condition requiring further evaluation, return to the ER, admission, or even surgical intervention.  I encouraged the patient to return to the emergency department immediately for any concerns, worsening symptoms, new complaints,  or if symptoms persist and they are unable to seek follow-up in a timely fashion. The patient/family expressed understanding and agreement with this plan    Shared decision making:   After full review of the patient's clinical presentation, review of any work-up including but not limited to laboratory studies and radiology obtained, I had a discussion with the patient.  Treatment options were discussed as well as the risks, benefits and consequences.  I discussed all findings with the patient and family members if available.  During the discussion, treatment goals were understood by all as well as any misconceptions which were addressed with the patient.  Ample time was given for any questions they may have had.  They are in agreement with the treatment plan as well as final disposition. [JK]      ED Course User Index  [JK] Benji Mathias MD                                                       Medical Decision Making  This is a 69-year-old male with a history of chronic kidney disease presenting with some abdominal pain and nausea.  The patient's symptoms seem most consistent with gastritis.  He has no acute abdomen or peritonitis on exam.  However given the patient's extensive abdominal surgery history and concern for possible bowel obstruction.  Overall, the patient is nontoxic.  Afebrile.  IV access was established in the patient.  Placed on continuous telemetry monitoring.  Given the patient's presentation, differential is broad and will require further evaluation.  Workup initiated.      Differential diagnosis: Peptic ulcer disease, dyspepsia, GERD, pancreatitis, biliary colic, electrolyte abnormality, acute kidney injury, small bowel obstruction      Amount and/or Complexity of Data Reviewed  Independent Historian: sada  External Data Reviewed: labs, radiology, ECG and notes.     Details: External laboratories, imaging as well as notes were reviewed personally by myself.  All relevant studies were  used to guide decision making.     Date of previous record: 1/22/2025    Source of note: Primary physician    Summary:  Patient was seen and evaluated for routine visit.  I did review basic laboratory studies on file as well as a previous chest x-ray and EKG.  Records reviewed    Labs: ordered. Decision-making details documented in ED Course.  Radiology: ordered and independent interpretation performed. Decision-making details documented in ED Course.    Risk  Prescription drug management.        Final diagnoses:   Acute superficial gastritis without hemorrhage   Dyspepsia   Nausea   Diverticulosis   Stage 3b chronic kidney disease   Primary hypertension       ED Disposition  ED Disposition       ED Disposition   Discharge    Condition   Stable    Comment   --               Josh Ortiz MD  [address] [facility] [address]  [place] [id number] [phone]    Call in 1 day           Medication List        New Prescriptions      ondansetron ODT 4 MG disintegrating tablet  Commonly known as: ZOFRAN-ODT  Place 1 tablet on the tongue Every 8 (Eight) Hours As Needed for Nausea or Vomiting for up to 5 days.               Where to Get Your Medications        These medications were sent to [id number] - [place], [place] - [address] AT [facility] & [address] - [phone] [facility] [phone]   [address] [facility] [address]      Phone: [phone]   ondansetron ODT 4 MG disintegrating tablet            Benji Mathias MD  05/19/25 9448

## 2025-05-22 ENCOUNTER — OFFICE VISIT (OUTPATIENT)
Dept: INTERNAL MEDICINE | Facility: CLINIC | Age: 70
End: 2025-05-22
Payer: MEDICARE

## 2025-05-22 VITALS
TEMPERATURE: 98 F | WEIGHT: 230.8 LBS | SYSTOLIC BLOOD PRESSURE: 130 MMHG | HEIGHT: 74 IN | HEART RATE: 82 BPM | OXYGEN SATURATION: 95 % | BODY MASS INDEX: 29.62 KG/M2 | DIASTOLIC BLOOD PRESSURE: 70 MMHG

## 2025-05-22 DIAGNOSIS — K52.9 ACUTE GASTROENTERITIS: Primary | ICD-10-CM

## 2025-05-22 DIAGNOSIS — F41.1 GENERALIZED ANXIETY DISORDER: ICD-10-CM

## 2025-05-22 NOTE — PROGRESS NOTES
[facility]     Donta Ledesma Jr.  1955   [id number]      Patient Care Team:  Josh Ortiz MD as PCP - General (Internal Medicine)  Shankar Woodward MD as Consulting Physician (Urology)  Ryan Lopez MD as Consulting Physician (Gastroenterology)  Darryl Hayes MD as Consulting Physician (Ophthalmology)  Aj Jerome APRN as Nurse Practitioner (Pulmonary Disease)  Ashlyn Guzman, RN as Ambulatory  ([facility])    Chief Complaint::   Chief Complaint   Patient presents with    ER f/u        HPI  History of Present Illness  The patient presents for follow-up of an emergency department visit on 05/19/2025 for abdominal discomfort. After evaluation, he was diagnosed with superficial gastritis.    He reports an improvement in his condition, with the abdominal discomfort persisting for 4 days before subsiding. He attributes this to the administration of Zofran during his emergency department visit on Monday night, which led to a significant improvement in his symptoms within a day and a half. By Tuesday afternoon, he felt his condition had returned to normal. He also mentions experiencing increased stress as he ages, which manifests as non-debilitating stomach issues. He is currently on a regimen of sertraline 25 mg and Wellbutrin, and expresses interest in increasing the dosage of sertraline to 50 mg.    Over the past year and a half, he has gained approximately 15 pounds, despite not perceiving any overeating habits and maintaining regular exercise. He is considering the use of weight loss medications to aid in his weight management.    He has a prostate biopsy scheduled for tomorrow.      Chronic Conditions:      Patient Active Problem List   Diagnosis    Irritable bowel syndrome    Annual physical exam    Mixed hyperlipidemia    Primary hypertension    Chronic kidney disease, stage III (moderate)    Sleep apnea    Disc disorder of cervical  region    Prediabetes    Major depressive disorder with single episode, in full remission    Generalized anxiety disorder        Past Medical History:   Diagnosis Date    Allergic     Biaxin    Back problem     Benign prostate hyperplasia     Glaucoma     Hypertension     Nephrolithiasis     CRISTINE (obstructive sleep apnea)     Renal insufficiency        Past Surgical History:   Procedure Laterality Date    COLONOSCOPY      Multiple times    EYE SURGERY      Lasix    WISDOM TOOTH EXTRACTION         Family History   Problem Relation Age of Onset    Hypertension Mother     Cancer Mother     Diabetes Father     Cancer Father     Hypertension Brother     Cancer Maternal Grandfather     Coronary artery disease Paternal Grandmother     Stroke Paternal Grandfather        Social History     Socioeconomic History    Marital status:    Tobacco Use    Smoking status: Never     Passive exposure: Never    Smokeless tobacco: Never   Vaping Use    Vaping status: Never Used   Substance and Sexual Activity    Alcohol use: Never    Drug use: Never    Sexual activity: Not Currently     Partners: Female       Allergies   Allergen Reactions    Erythromycin Unknown - Low Severity     erythromycin    Clarithromycin Other (See Comments) and Unknown - Low Severity     Unknown;  BIAXIN    Biaxin         Current Outpatient Medications:     amLODIPine (NORVASC) 10 MG tablet, Take 1 tablet by mouth Daily., Disp: 90 tablet, Rfl: 3    atorvastatin (LIPITOR) 20 MG tablet, TAKE 1 TABLET BY MOUTH DAILY, Disp: 90 tablet, Rfl: 3    buPROPion XL (WELLBUTRIN XL) 300 MG 24 hr tablet, TAKE 1 TABLET BY MOUTH DAILY, Disp: 90 tablet, Rfl: 3    Cholecalciferol (Vitamin D3) 50 MCG (2000 UT) tablet, Take 1 tablet by mouth 4 (Four) Times a Week., Disp: , Rfl:     doxazosin (CARDURA) 4 MG tablet, Take 1 tablet by mouth Daily., Disp: , Rfl:     sertraline (ZOLOFT) 50 MG tablet, Take 1 tablet by mouth Daily., Disp: 90 tablet, Rfl: 3    Review of Systems  "  Constitutional: Negative.    Respiratory: Negative.  Negative for chest tightness and shortness of breath.    Cardiovascular: Negative.  Negative for chest pain.   Gastrointestinal:  Negative for abdominal pain, blood in stool, constipation and diarrhea.        Vital Signs  Vitals:    05/22/25 0801   BP: 130/70   BP Location: Left arm   Patient Position: Sitting   Cuff Size: Adult   Pulse: 82   Temp: 98 °F (36.7 °C)   TempSrc: Infrared   SpO2: 95%   Weight: 105 kg (230 lb 12.8 oz)   Height: 188 cm (74.02\")   PainSc: 0-No pain       Physical Exam  Vitals reviewed.   Constitutional:       Appearance: Normal appearance. He is well-developed.   HENT:      Head: Normocephalic and atraumatic.   Cardiovascular:      Rate and Rhythm: Normal rate and regular rhythm.      Heart sounds: Normal heart sounds. No murmur heard.  Pulmonary:      Effort: Pulmonary effort is normal.      Breath sounds: Normal breath sounds.   Abdominal:      General: Bowel sounds are normal.      Palpations: Abdomen is soft.      Tenderness: There is no abdominal tenderness.   Neurological:      Mental Status: He is alert and oriented to person, place, and time.        Physical Exam      Procedures    ACE III MINI        Results  Labs   - Creatinine levels: Stable   - Blood counts: Normal    Imaging   - CAT scan: Diverticulosis           Assessment/Plan:    Diagnoses and all orders for this visit:    1. Acute gastroenteritis (Primary)    2. Generalized anxiety disorder    Other orders  -     sertraline (ZOLOFT) 50 MG tablet; Take 1 tablet by mouth Daily.  Dispense: 90 tablet; Refill: 3      Assessment & Plan  1. Gastritis versus gastroenteritis.  - Symptoms have completely resolved, suggesting a viral gastroenteritis.  - No further treatment or workup is necessary.    2. Depression and anxiety.  - Chronic GI symptoms in the past were attributed to work-related stress and improved significantly after long-term.  - Currently experiencing additional " stress, which may be affecting GI symptoms.  - Sertraline dosage will be increased to 50 mg daily, along with 300 mg of extended-release bupropion.  - Follow-up as scheduled.    3. Weight management.  - Gained about 15 pounds over the last year and a half despite not overeating and getting plenty of exercise.  - Discussed the potential use of weight loss medications, noting their effectiveness but also the cost and insurance coverage limitations.  - Not morbidly obese and does not have diabetes, which may affect insurance coverage for these medications.  - Advised to check with insurance regarding coverage for weight loss drugs.      Plan of care reviewed with patient at the conclusion of today's visit. Education was provided regarding diagnosis, management, and any prescribed or recommended OTC medications.Patient verbalizes understanding of and agreement with management plan.     Patient or patient representative verbalized consent for the use of Ambient Listening during the visit with  Josh Ortiz MD for chart documentation. 5/25/2025  17:03 EDT        Josh Ortiz MD

## 2025-05-25 PROBLEM — F41.1 GENERALIZED ANXIETY DISORDER: Status: ACTIVE | Noted: 2025-05-25

## 2025-06-03 ENCOUNTER — TELEPHONE (OUTPATIENT)
Dept: CASE MANAGEMENT | Facility: OTHER | Age: 70
End: 2025-06-03
Payer: MEDICARE

## 2025-06-03 ENCOUNTER — TELEPHONE (OUTPATIENT)
Dept: INTERNAL MEDICINE | Facility: CLINIC | Age: 70
End: 2025-06-03
Payer: MEDICARE

## 2025-06-03 ENCOUNTER — PATIENT OUTREACH (OUTPATIENT)
Dept: CASE MANAGEMENT | Facility: OTHER | Age: 70
End: 2025-06-03
Payer: MEDICARE

## 2025-06-03 DIAGNOSIS — K58.2 IRRITABLE BOWEL SYNDROME WITH BOTH CONSTIPATION AND DIARRHEA: ICD-10-CM

## 2025-06-03 DIAGNOSIS — F41.1 GENERALIZED ANXIETY DISORDER: ICD-10-CM

## 2025-06-03 DIAGNOSIS — R10.9 CHRONIC ABDOMINAL PAIN: ICD-10-CM

## 2025-06-03 DIAGNOSIS — K58.0 IRRITABLE BOWEL SYNDROME WITH DIARRHEA: Primary | ICD-10-CM

## 2025-06-03 DIAGNOSIS — I10 PRIMARY HYPERTENSION: ICD-10-CM

## 2025-06-03 DIAGNOSIS — N18.32 STAGE 3B CHRONIC KIDNEY DISEASE: ICD-10-CM

## 2025-06-03 DIAGNOSIS — G89.29 CHRONIC ABDOMINAL PAIN: ICD-10-CM

## 2025-06-03 DIAGNOSIS — E78.2 MIXED HYPERLIPIDEMIA: ICD-10-CM

## 2025-06-03 DIAGNOSIS — M54.30 SCIATICA, UNSPECIFIED LATERALITY: Primary | ICD-10-CM

## 2025-06-03 NOTE — TELEPHONE ENCOUNTER
RN-ACM made outreach to patient regarding recent ED visit. No answer, left message.  F/u outreach scheduled.

## 2025-06-03 NOTE — PLAN OF CARE
Problem: Fall Risk  Goal: Prevent Falls and Injury  Outcome: Progressing     Problem: Hypertension  Goal: Track and Manage My Blood Pressure  Outcome: Progressing     Problem: Anxiety  Goal: Track My Symptoms  Outcome: Progressing     Problem: Chronic Kidney  Goal: Track and Manage My Symptoms  Outcome: Progressing

## 2025-06-03 NOTE — OUTREACH NOTE
CCM Interim Update    RN-MK made outreach to patient regarding recent ED visit. Patient was pleasant and appreciative of the call. Role and reason for call explained. Patient was in the ED on 5/19 with abdominal pain and sob. It was determined that patient had PNA. Patient reports that his PNA has resolved, however, her remains nauseas and continues to have abdominal pain. Pcp has placed GI referral. Patient is also experiencing sciatica, for which pcp has place a referral for outpatient PT at [facility]. Patient has all medications and is taking them as prescribed.    F/u outreach scheduled.      Care Coordination    GI--abdominal pain and nausea  PT--Outpt PT sciatica    Adult Patient Profile  Questions/Answers      Flowsheet Row Most Recent Value   Symptoms/Conditions Managed at Home behavioral health, cardiovascular, gastrointestinal, genitourinary   Cardiovascular Symptoms/Conditions hypertension   Gastrointestinal Symptoms/Conditions abdominal pain, irritable bowel syndrome, nausea   Gastrointestinal Management Strategies other (see comments)  [GI referral]   Genitourinary Symptoms/Conditions chronic kidney disease   Behavioral Health Symptoms/Conditions anxiety        Send Education  AMBULATORY CASE MANAGEMENT NOTE    Names and Relationships of Patient/Support Persons:  -         Education Documentation  Coping Strategies, taught by Ashlyn Guzman, RN at 6/3/2025  3:34 PM.  Learner: Patient  Readiness: Acceptance  Method: Explanation  Response: Verbalizes Understanding          Ashlyn MCCABE  Ambulatory Case Management    6/3/2025, 15:37 EDT

## 2025-06-05 ENCOUNTER — HOSPITAL ENCOUNTER (EMERGENCY)
Facility: HOSPITAL | Age: 70
Discharge: HOME | End: 2025-06-05
Attending: EMERGENCY MEDICINE
Payer: MEDICARE

## 2025-06-05 VITALS
DIASTOLIC BLOOD PRESSURE: 77 MMHG | BODY MASS INDEX: 28.88 KG/M2 | SYSTOLIC BLOOD PRESSURE: 149 MMHG | WEIGHT: 225 LBS | TEMPERATURE: 97.8 F | RESPIRATION RATE: 18 BRPM | OXYGEN SATURATION: 97 % | HEIGHT: 74 IN | HEART RATE: 80 BPM

## 2025-06-05 DIAGNOSIS — N18.9 CHRONIC KIDNEY DISEASE, UNSPECIFIED CKD STAGE: ICD-10-CM

## 2025-06-05 DIAGNOSIS — R82.4 KETONURIA: ICD-10-CM

## 2025-06-05 DIAGNOSIS — R11.2 NAUSEA AND VOMITING, UNSPECIFIED VOMITING TYPE: Primary | ICD-10-CM

## 2025-06-05 DIAGNOSIS — R79.89 ELEVATED SERUM CREATININE: ICD-10-CM

## 2025-06-05 LAB
ALBUMIN SERPL-MCNC: 4.4 G/DL (ref 3.5–5.2)
ALBUMIN/GLOB SERPL: 1.3 G/DL
ALP SERPL-CCNC: 128 U/L (ref 39–117)
ALT SERPL W P-5'-P-CCNC: 22 U/L (ref 1–41)
ANION GAP SERPL CALCULATED.3IONS-SCNC: 16 MMOL/L (ref 5–15)
AST SERPL-CCNC: 34 U/L (ref 1–40)
BACTERIA UR QL AUTO: ABNORMAL /HPF
BASOPHILS # BLD AUTO: 0.04 10*3/MM3 (ref 0–0.2)
BASOPHILS NFR BLD AUTO: 0.6 % (ref 0–1.5)
BILIRUB SERPL-MCNC: 0.8 MG/DL (ref 0–1.2)
BILIRUB UR QL STRIP: NEGATIVE
BUN SERPL-MCNC: 25.4 MG/DL (ref 8–23)
BUN/CREAT SERPL: 8.9 (ref 7–25)
CALCIUM SPEC-SCNC: 9.8 MG/DL (ref 8.6–10.5)
CHLORIDE SERPL-SCNC: 104 MMOL/L (ref 98–107)
CLARITY UR: CLEAR
CO2 SERPL-SCNC: 19 MMOL/L (ref 22–29)
COLOR UR: YELLOW
CREAT SERPL-MCNC: 2.84 MG/DL (ref 0.76–1.27)
D-LACTATE SERPL-SCNC: 1.5 MMOL/L (ref 0.5–2)
DEPRECATED RDW RBC AUTO: 42.1 FL (ref 37–54)
EGFRCR SERPLBLD CKD-EPI 2021: 23.1 ML/MIN/1.73
EOSINOPHIL # BLD AUTO: 0.02 10*3/MM3 (ref 0–0.4)
EOSINOPHIL NFR BLD AUTO: 0.3 % (ref 0.3–6.2)
ERYTHROCYTE [DISTWIDTH] IN BLOOD BY AUTOMATED COUNT: 13.2 % (ref 12.3–15.4)
GLOBULIN UR ELPH-MCNC: 3.4 GM/DL
GLUCOSE SERPL-MCNC: 102 MG/DL (ref 65–99)
GLUCOSE UR STRIP-MCNC: NEGATIVE MG/DL
HCT VFR BLD AUTO: 43.7 % (ref 37.5–51)
HGB BLD-MCNC: 14.9 G/DL (ref 13–17.7)
HGB UR QL STRIP.AUTO: ABNORMAL
HOLD SPECIMEN: NORMAL
HYALINE CASTS UR QL AUTO: ABNORMAL /LPF
IMM GRANULOCYTES # BLD AUTO: 0.02 10*3/MM3 (ref 0–0.05)
IMM GRANULOCYTES NFR BLD AUTO: 0.3 % (ref 0–0.5)
KETONES UR QL STRIP: ABNORMAL
LEUKOCYTE ESTERASE UR QL STRIP.AUTO: ABNORMAL
LIPASE SERPL-CCNC: 41 U/L (ref 13–60)
LYMPHOCYTES # BLD AUTO: 1.03 10*3/MM3 (ref 0.7–3.1)
LYMPHOCYTES NFR BLD AUTO: 15.7 % (ref 19.6–45.3)
MCH RBC QN AUTO: 29.9 PG (ref 26.6–33)
MCHC RBC AUTO-ENTMCNC: 34.1 G/DL (ref 31.5–35.7)
MCV RBC AUTO: 87.6 FL (ref 79–97)
MONOCYTES # BLD AUTO: 0.57 10*3/MM3 (ref 0.1–0.9)
MONOCYTES NFR BLD AUTO: 8.7 % (ref 5–12)
NEUTROPHILS NFR BLD AUTO: 4.9 10*3/MM3 (ref 1.7–7)
NEUTROPHILS NFR BLD AUTO: 74.4 % (ref 42.7–76)
NITRITE UR QL STRIP: NEGATIVE
NRBC BLD AUTO-RTO: 0 /100 WBC (ref 0–0.2)
PH UR STRIP.AUTO: 6.5 [PH] (ref 5–8)
PLATELET # BLD AUTO: 235 10*3/MM3 (ref 140–450)
PMV BLD AUTO: 8.8 FL (ref 6–12)
POTASSIUM SERPL-SCNC: 3.8 MMOL/L (ref 3.5–5.2)
PROT SERPL-MCNC: 7.8 G/DL (ref 6–8.5)
PROT UR QL STRIP: ABNORMAL
RBC # BLD AUTO: 4.99 10*6/MM3 (ref 4.14–5.8)
RBC # UR STRIP: ABNORMAL /HPF
REF LAB TEST METHOD: ABNORMAL
SODIUM SERPL-SCNC: 139 MMOL/L (ref 136–145)
SP GR UR STRIP: 1.02 (ref 1–1.03)
SQUAMOUS #/AREA URNS HPF: ABNORMAL /HPF
UROBILINOGEN UR QL STRIP: ABNORMAL
WBC # UR STRIP: ABNORMAL /HPF
WBC NRBC COR # BLD AUTO: 6.58 10*3/MM3 (ref 3.4–10.8)
WHOLE BLOOD HOLD COAG: NORMAL
WHOLE BLOOD HOLD SPECIMEN: NORMAL

## 2025-06-05 PROCEDURE — 85025 COMPLETE CBC W/AUTO DIFF WBC: CPT | Performed by: EMERGENCY MEDICINE

## 2025-06-05 PROCEDURE — 83690 ASSAY OF LIPASE: CPT | Performed by: EMERGENCY MEDICINE

## 2025-06-05 PROCEDURE — 80053 COMPREHEN METABOLIC PANEL: CPT | Performed by: EMERGENCY MEDICINE

## 2025-06-05 PROCEDURE — 83605 ASSAY OF LACTIC ACID: CPT | Performed by: EMERGENCY MEDICINE

## 2025-06-05 PROCEDURE — 81001 URINALYSIS AUTO W/SCOPE: CPT | Performed by: EMERGENCY MEDICINE

## 2025-06-05 PROCEDURE — 96374 THER/PROPH/DIAG INJ IV PUSH: CPT

## 2025-06-05 PROCEDURE — 25010000002 ONDANSETRON PER 1 MG: Performed by: EMERGENCY MEDICINE

## 2025-06-05 PROCEDURE — 99283 EMERGENCY DEPT VISIT LOW MDM: CPT

## 2025-06-05 PROCEDURE — 63710000001 PROMETHAZINE PER 25 MG: Performed by: EMERGENCY MEDICINE

## 2025-06-05 PROCEDURE — 25810000003 SODIUM CHLORIDE 0.9 % SOLUTION: Performed by: EMERGENCY MEDICINE

## 2025-06-05 RX ORDER — PROMETHAZINE HYDROCHLORIDE 25 MG/1
25 TABLET ORAL ONCE
Status: COMPLETED | OUTPATIENT
Start: 2025-06-05 | End: 2025-06-05

## 2025-06-05 RX ORDER — PROMETHAZINE HYDROCHLORIDE 25 MG/1
25 TABLET ORAL EVERY 6 HOURS PRN
Qty: 8 TABLET | Refills: 0 | Status: SHIPPED | OUTPATIENT
Start: 2025-06-05 | End: 2025-06-11 | Stop reason: SDUPTHER

## 2025-06-05 RX ORDER — SODIUM CHLORIDE 9 MG/ML
10 INJECTION, SOLUTION INTRAMUSCULAR; INTRAVENOUS; SUBCUTANEOUS AS NEEDED
Status: DISCONTINUED | OUTPATIENT
Start: 2025-06-05 | End: 2025-06-05 | Stop reason: HOSPADM

## 2025-06-05 RX ORDER — ONDANSETRON 2 MG/ML
4 INJECTION INTRAMUSCULAR; INTRAVENOUS ONCE
Status: COMPLETED | OUTPATIENT
Start: 2025-06-05 | End: 2025-06-05

## 2025-06-05 RX ORDER — ONDANSETRON 4 MG/1
4 TABLET, FILM COATED ORAL EVERY 6 HOURS PRN
Qty: 8 TABLET | Refills: 0 | Status: SHIPPED | OUTPATIENT
Start: 2025-06-05 | End: 2025-06-11 | Stop reason: SDUPTHER

## 2025-06-05 RX ADMIN — PROMETHAZINE HYDROCHLORIDE 25 MG: 25 TABLET ORAL at 11:42

## 2025-06-05 RX ADMIN — ONDANSETRON 4 MG: 2 INJECTION INTRAMUSCULAR; INTRAVENOUS at 11:41

## 2025-06-05 RX ADMIN — SODIUM CHLORIDE 1000 ML: 9 INJECTION, SOLUTION INTRAVENOUS at 14:18

## 2025-06-05 NOTE — ED PROVIDER NOTES
Subjective   History of Present Illness  Patient is a pleasant 69-year-old male presents to the emergency department with nausea.  States that over the past 1 month he has had 2 other episodes similar to this.  CT scan 3 weeks ago did not show acute pathology.  Labs which has been normal.  Has been taking Zofran along with Pepcid at home but unfortunately symptoms continue to return.  Denies obvious aggravating factors.  No known bad foods.  Denies other associated complaints.  Denies fever, chills, chest pain, shortness of breath.  Despite the nausea denies abdominal pain.  States that he occasionally has some mild constipation but did have a bowel movement this morning.  Denies other acute complaints.      Review of Systems   All other systems reviewed and are negative.      Past Medical History:   Diagnosis Date   • Allergic     Biaxin   • Back problem    • Benign prostate hyperplasia    • Glaucoma    • Hypertension    • Nephrolithiasis    • CRISTINE (obstructive sleep apnea)    • Renal insufficiency        Allergies   Allergen Reactions   • Erythromycin Unknown - Low Severity     erythromycin   • Clarithromycin Other (See Comments) and Unknown - Low Severity     Unknown;  BIAXIN    Biaxin       Past Surgical History:   Procedure Laterality Date   • COLONOSCOPY      Multiple times   • EYE SURGERY      Lasix   • WISDOM TOOTH EXTRACTION         Family History   Problem Relation Age of Onset   • Hypertension Mother    • Cancer Mother    • Diabetes Father    • Cancer Father    • Hypertension Brother    • Cancer Maternal Grandfather    • Coronary artery disease Paternal Grandmother    • Stroke Paternal Grandfather        Social History     Socioeconomic History   • Marital status:    Tobacco Use   • Smoking status: Never     Passive exposure: Never   • Smokeless tobacco: Never   Vaping Use   • Vaping status: Never Used   Substance and Sexual Activity   • Alcohol use: Never   • Drug use: Never   • Sexual activity:  Not Currently     Partners: Female           Objective   Physical Exam  Vitals and nursing note reviewed.   Constitutional:       Appearance: Normal appearance.   HENT:      Head: Normocephalic.      Mouth/Throat:      Mouth: Mucous membranes are moist.   Eyes:      Extraocular Movements: Extraocular movements intact.      Pupils: Pupils are equal, round, and reactive to light.   Cardiovascular:      Rate and Rhythm: Normal rate and regular rhythm.      Pulses: Normal pulses.      Heart sounds: Normal heart sounds. No murmur heard.  Pulmonary:      Effort: Pulmonary effort is normal. No respiratory distress.      Breath sounds: Normal breath sounds. No wheezing or rhonchi.   Abdominal:      General: Bowel sounds are normal. There is no distension.      Palpations: Abdomen is soft.      Tenderness: There is no abdominal tenderness. There is no guarding or rebound.   Musculoskeletal:         General: Normal range of motion.      Cervical back: Normal range of motion.   Skin:     General: Skin is warm and dry.      Capillary Refill: Capillary refill takes less than 2 seconds.   Neurological:      General: No focal deficit present.      Mental Status: He is alert and oriented to person, place, and time.   Psychiatric:         Behavior: Behavior normal.         Thought Content: Thought content normal.       Procedures           ED Course      No results found for this or any previous visit (from the past 24 hours).  Note: In addition to lab results from this visit, the labs listed above may include labs taken at another facility or during a different encounter within the last 24 hours. Please correlate lab times with ED admission and discharge times for further clarification of the services performed during this visit.    No orders to display     Vitals:    06/05/25 1109   BP: 139/92   BP Location: Right arm   Patient Position: Sitting   Pulse: 97   Resp: 18   Temp: 97.8 °F (36.6 °C)   TempSrc: Oral   SpO2: 98%   Weight:  "102 kg (225 lb)   Height: 188 cm (74\")     Medications   Sodium Chloride (PF) 0.9 % 10 mL (has no administration in time range)   ondansetron (ZOFRAN) injection 4 mg (has no administration in time range)   promethazine (PHENERGAN) tablet 25 mg (has no administration in time range)     ECG/EMG Results (last 24 hours)       ** No results found for the last 24 hours. **          No orders to display                                                        Medical Decision Making  Amount and/or Complexity of Data Reviewed  Labs: ordered.    Risk  Prescription drug management.        Final diagnoses:   None       ED Disposition  ED Disposition       None            No follow-up provider specified.       Medication List      No changes were made to your prescriptions during this visit.         " Location:       Patient Position:       Pulse: 77 75 72 80   Resp:       Temp:       TempSrc:       SpO2: 97% 92% 94% 97%   Weight:       Height:         Medications   ondansetron (ZOFRAN) injection 4 mg (4 mg Intravenous Given 6/5/25 1141)   promethazine (PHENERGAN) tablet 25 mg (25 mg Oral Given 6/5/25 1142)   sodium chloride 0.9 % bolus 1,000 mL (0 mL Intravenous Stopped 6/5/25 1448)     ECG/EMG Results (last 24 hours)       ** No results found for the last 24 hours. **          No orders to display                                                        Medical Decision Making  Pt feels better following treatment.  He has reliable outpatient follow up and understands to have a low threshold to return to the ED if symptoms persist, worsen, or other concerns arise.    Problems Addressed:  Chronic kidney disease, unspecified CKD stage: complicated acute illness or injury  Elevated serum creatinine: complicated acute illness or injury  Ketonuria: complicated acute illness or injury  Nausea and vomiting, unspecified vomiting type: complicated acute illness or injury    Amount and/or Complexity of Data Reviewed  External Data Reviewed: notes.  Labs: ordered. Decision-making details documented in ED Course.    Risk  Prescription drug management.        Final diagnoses:   Nausea and vomiting, unspecified vomiting type   Elevated serum creatinine   Ketonuria   Chronic kidney disease, unspecified CKD stage       ED Disposition  ED Disposition       ED Disposition   Discharge    Condition   Stable    Comment   --             DISCHARGE    Patient discharged in stable condition.    Reviewed implications of results, diagnosis, meds, responsibility to follow up, warning signs and symptoms of possible worsening, potential complications and reasons to return to ER.    Patient/Family voiced understanding of above instructions.    Discussed plan for discharge, as there is no emergent indication for admission.  Pt/family is  agreeable and understands need for follow up and possible repeat testing.  Pt/family is aware that discharge does not mean that nothing is wrong but that it indicates no emergency is currently present that requires admission and they must continue care with follow-up as given below or with a physician of their choice.     FOLLOW-UP  Josh Ortiz MD  [address] [facility] [address]  [place] [id number]  [phone]    Schedule an appointment as soon as possible for a visit       [facility] EMERGENCY DEPARTMENT  [address] [address] [phone]    If symptoms worsen    Neurology on the 25th of this month is already scheduled    Schedule an appointment as soon as possible for a visit       Elan Bui MD  [address]  [address]  [facility] [address] [phone]    Schedule an appointment as soon as possible for a visit            Medication List      No changes were made to your prescriptions during this visit.            Gabe Tomlin, DO  06/20/25 1214

## 2025-06-19 DIAGNOSIS — M48.061 SPINAL STENOSIS OF LUMBAR REGION WITHOUT NEUROGENIC CLAUDICATION: Primary | ICD-10-CM

## 2025-06-24 ENCOUNTER — OFFICE VISIT (OUTPATIENT)
Dept: GASTROENTEROLOGY | Facility: CLINIC | Age: 70
End: 2025-06-24
Payer: MEDICARE

## 2025-06-24 VITALS
HEART RATE: 110 BPM | OXYGEN SATURATION: 97 % | TEMPERATURE: 97.7 F | WEIGHT: 221 LBS | HEIGHT: 74 IN | BODY MASS INDEX: 28.36 KG/M2 | DIASTOLIC BLOOD PRESSURE: 98 MMHG | SYSTOLIC BLOOD PRESSURE: 148 MMHG

## 2025-06-24 DIAGNOSIS — R11.0 NAUSEA: Primary | ICD-10-CM

## 2025-06-24 DIAGNOSIS — R19.4 CHANGE IN BOWEL HABITS: ICD-10-CM

## 2025-06-24 DIAGNOSIS — K59.00 CONSTIPATION, UNSPECIFIED CONSTIPATION TYPE: ICD-10-CM

## 2025-06-24 DIAGNOSIS — R63.4 WEIGHT LOSS: ICD-10-CM

## 2025-06-24 PROCEDURE — 3080F DIAST BP >= 90 MM HG: CPT | Performed by: NURSE PRACTITIONER

## 2025-06-24 PROCEDURE — 3077F SYST BP >= 140 MM HG: CPT | Performed by: NURSE PRACTITIONER

## 2025-06-24 PROCEDURE — 1159F MED LIST DOCD IN RCRD: CPT | Performed by: NURSE PRACTITIONER

## 2025-06-24 PROCEDURE — 1160F RVW MEDS BY RX/DR IN RCRD: CPT | Performed by: NURSE PRACTITIONER

## 2025-06-24 PROCEDURE — 99214 OFFICE O/P EST MOD 30 MIN: CPT | Performed by: NURSE PRACTITIONER

## 2025-06-24 RX ORDER — PROMETHAZINE HYDROCHLORIDE 25 MG/1
25 TABLET ORAL EVERY 6 HOURS PRN
Qty: 20 TABLET | Refills: 0 | Status: SHIPPED | OUTPATIENT
Start: 2025-06-24 | End: 2025-06-24 | Stop reason: SDUPTHER

## 2025-06-24 RX ORDER — PROMETHAZINE HYDROCHLORIDE 25 MG/1
25 TABLET ORAL EVERY 6 HOURS PRN
Qty: 20 TABLET | Refills: 0 | Status: SHIPPED | OUTPATIENT
Start: 2025-06-24

## 2025-06-24 RX ORDER — ONDANSETRON 4 MG/1
4 TABLET, ORALLY DISINTEGRATING ORAL EVERY 8 HOURS PRN
Qty: 30 TABLET | Refills: 2 | Status: SHIPPED | OUTPATIENT
Start: 2025-06-24

## 2025-06-24 NOTE — PROGRESS NOTES
"     New Patient Consultation     Date: 2025   Patient Name: Donta Ledesma Jr.  : 1955   MRN: [id number]     Chief Complaint:    Chief Complaint   Patient presents with    Abdominal Pain       History of Present Illness: Donta Ledesma Jr. is a 69 y.o. male who is here today for a Gastroenterology Consultation for nausea and bloating. Symptoms started in the winter months and has resulted to 3 ED visits.  Endorses unintentional weight loss of around 20 pounds. Not eating good or sleeping well. The symptoms are intermittent and generally wake him up at night, around 2 to 5 AM.  He will have a bowel movement which is accompanied by nausea and belching.  Symptoms can last until 3 or 4 in the afternoon. Will use phenergan and Zofran PRN and it will help to \"take the edge off\". Some days he is so nauseated he has trouble eating. Denies heartburn and does not take PPI. Denies any medication changes around the time his symptoms started.     He reports having similar symptoms 20 years ago.  Underwent EGD and colonoscopy, which were unrevealing.  He was told the symptoms were related to anxiety/stress (IBS) and he was started on sertraline and BuSpar.  Symptoms had not returned until recently. States symptoms feel like those he experienced 20 years ago. He retains his gallbladder.     Usually has a daily BM. Has experienced some mild constipation lately. He will use Senna as needed and this helps.     Underwent right nephrectomy 25, per Dr. Jamil Matta.     CT Abdomen Pelvis Without Contrast (2025) s/p right nephrectomy. Prostatomegaly. Colonic diverticulosis.   CT Abdomen Pelvis With Contrast (2024) Consolidation within the lingula suspicious for pneumonia. In the absence of pulmonary symptoms, there may be mucous plugging or possible underlying mass. Solid-appearing partially exophytic 3.2 cm lesion arising from the lateral aspect of the mid right kidney. This is suspicious for a renal " cell carcinoma. Recommend Urology follow-up for further management.  COLONOSCOPY (07/01/2021) for history of polyps, per Dr. Lopez: Diverticulosis sigmoid colon.  Flat polyp removed from ascending colon. 5 year repeat recommended.   Path: benign colonic mucosa    Subjectiv      Review of Systems:   Review of Systems   Constitutional:  Positive for appetite change, fatigue and unexpected weight loss.   HENT: Negative.     Eyes: Negative.    Respiratory: Negative.     Cardiovascular: Negative.    Gastrointestinal:  Positive for abdominal distention (bloating), abdominal pain, constipation and nausea.   Endocrine: Negative.    Genitourinary: Negative.    Musculoskeletal: Negative.    Skin: Negative.    Allergic/Immunologic: Negative.    Neurological: Negative.    Hematological: Negative.    Psychiatric/Behavioral: Negative.         Past Medical History:   Past Medical History:   Diagnosis Date    Allergic     Biaxin    Back problem     Benign prostate hyperplasia     Glaucoma     Hypertension     Nephrolithiasis     CRISTINE (obstructive sleep apnea)     Renal insufficiency        Past Surgical History:   Past Surgical History:   Procedure Laterality Date    COLONOSCOPY      Multiple times    EYE SURGERY      Lasix    WISDOM TOOTH EXTRACTION         Family History:   Family History   Problem Relation Age of Onset    Hypertension Mother     Cancer Mother     Diabetes Father     Cancer Father     Hypertension Brother     Cancer Maternal Grandfather     Coronary artery disease Paternal Grandmother     Stroke Paternal Grandfather        Social History:   Social History     Socioeconomic History    Marital status:    Tobacco Use    Smoking status: Never     Passive exposure: Never    Smokeless tobacco: Never   Vaping Use    Vaping status: Never Used   Substance and Sexual Activity    Alcohol use: Never    Drug use: Never    Sexual activity: Not Currently     Partners: Female       Alcohol/Tobacco History:   Social History  "    Substance and Sexual Activity   Alcohol Use Never     Social History     Tobacco Use   Smoking Status Never    Passive exposure: Never   Smokeless Tobacco Never       Medications:     Current Outpatient Medications:     amLODIPine (NORVASC) 10 MG tablet, Take 1 tablet by mouth Daily., Disp: 90 tablet, Rfl: 3    atorvastatin (LIPITOR) 20 MG tablet, TAKE 1 TABLET BY MOUTH DAILY, Disp: 90 tablet, Rfl: 3    buPROPion XL (WELLBUTRIN XL) 300 MG 24 hr tablet, TAKE 1 TABLET BY MOUTH DAILY, Disp: 90 tablet, Rfl: 3    Cholecalciferol (Vitamin D3) 50 MCG (2000 UT) tablet, Take 1 tablet by mouth 4 (Four) Times a Week., Disp: , Rfl:     doxazosin (CARDURA) 4 MG tablet, Take 1 tablet by mouth Daily., Disp: , Rfl:     promethazine (PHENERGAN) 25 MG tablet, Take 1 tablet by mouth Every 6 (Six) Hours As Needed for Nausea or Vomiting., Disp: 20 tablet, Rfl: 0    sertraline (ZOLOFT) 50 MG tablet, Take 1 tablet by mouth Daily., Disp: 90 tablet, Rfl: 3    ondansetron ODT (ZOFRAN-ODT) 4 MG disintegrating tablet, Place 1 tablet on the tongue Every 8 (Eight) Hours As Needed for Nausea or Vomiting., Disp: 30 tablet, Rfl: 2    Sod Picosulfate-Mag Ox-Cit Acd 10-3.5-12 MG-GM -GM/160ML solution, Take 350 mL by mouth Take As Directed for 1 dose., Disp: 350 mL, Rfl: 0    Allergies:   Allergies   Allergen Reactions    Erythromycin Unknown - Low Severity     erythromycin    Clarithromycin Other (See Comments) and Unknown - Low Severity     Unknown;  BIAXIN    Biaxin       Objective     Physical Exam:  Vital Signs:   Vitals:    06/24/25 1124   BP: 148/98  Comment: patient in pain during visit   BP Location: Right arm   Patient Position: Sitting   Cuff Size: Adult   Pulse: 110   Temp: 97.7 °F (36.5 °C)   TempSrc: Temporal   SpO2: 97%   Weight: 100 kg (221 lb)   Height: 188 cm (74\")   PainSc: 8    PainLoc: Abdomen     Body mass index is 28.37 kg/m².     Metrics:   Donta Ledesma Jr.  reports that he has never smoked. He has never been exposed to " tobacco smoke. He has never used smokeless tobacco.     Colonoscopy/Colon Cancer Screenin2026, but ordered today for change in bowel habits and weight loss.    COLONOSCOPY (2021) for history of polyps, per Dr. Lopez: Diverticulosis sigmoid colon.  Flat polyp removed from ascending colon. 5 year repeat recommended.   Path: benign colonic mucosa    Physical Exam  Vitals and nursing note reviewed.   Constitutional:       General: He is not in acute distress.     Appearance: Normal appearance. He is ill-appearing.   HENT:      Head: Normocephalic and atraumatic.      Right Ear: External ear normal.      Left Ear: External ear normal.      Nose: Nose normal.      Mouth/Throat:      Mouth: Mucous membranes are moist.   Eyes:      Extraocular Movements: Extraocular movements intact.      Conjunctiva/sclera: Conjunctivae normal.      Pupils: Pupils are equal, round, and reactive to light.   Cardiovascular:      Rate and Rhythm: Normal rate and regular rhythm.      Heart sounds: Normal heart sounds.   Pulmonary:      Effort: Pulmonary effort is normal.      Breath sounds: Normal breath sounds.   Abdominal:      General: Abdomen is flat. Bowel sounds are normal. There is no distension.      Palpations: Abdomen is soft.      Tenderness: There is no abdominal tenderness.      Hernia: No hernia is present.   Musculoskeletal:         General: Normal range of motion.      Cervical back: Normal range of motion.   Skin:     General: Skin is warm and dry.   Neurological:      General: No focal deficit present.      Mental Status: He is alert and oriented to person, place, and time.   Psychiatric:         Attention and Perception: Attention and perception normal.         Mood and Affect: Mood and affect normal.         Speech: Speech normal.         Behavior: Behavior normal. Behavior is cooperative.         Thought Content: Thought content normal.         Cognition and Memory: Cognition normal.         Judgment: Judgment  normal.         Assessment / Plan      Assessment/Plan:   Diagnoses and all orders for this visit:    1. Nausea (Primary)  -     Ambulatory referral for Screening EGD  -     Ambulatory Referral For Screening Colonoscopy  -     Celiac Disease Panel; Future  -     TSH; Future  -     Discontinue: promethazine (PHENERGAN) 25 MG tablet; Take 1 tablet by mouth Every 6 (Six) Hours As Needed for Nausea or Vomiting.  Dispense: 20 tablet; Refill: 0  -     ondansetron ODT (ZOFRAN-ODT) 4 MG disintegrating tablet; Place 1 tablet on the tongue Every 8 (Eight) Hours As Needed for Nausea or Vomiting.  Dispense: 30 tablet; Refill: 2  -     promethazine (PHENERGAN) 25 MG tablet; Take 1 tablet by mouth Every 6 (Six) Hours As Needed for Nausea or Vomiting.  Dispense: 20 tablet; Refill: 0    2. Constipation, unspecified constipation type  -     TSH; Future    3. Change in bowel habits   - will proceed with colonoscopy    4. Weight loss   - EGD and colonoscopy     Start MiraLax for mild constipation.   He will call his nephrologist to see if his nausea may be related to his renal failure.   Refills of antiemetics sent  Will rule out Celiac disease and check his thyroid.   Could consider repeating imaging with contrast or gallbladder workup, if EGD and colonoscopy are unremarkable and further workup is warranted.   Recent ED notes, labs and imaging reviewed.     Follow Up:   Return in about 8 weeks (around 8/19/2025).    Plan of care reviewed with the patient at the conclusion of today's visit.  Education was provided regarding diagnosis, management, and any prescribed or recommended OTC medications.  Patient verbalized understanding of and agreement with management plan.     NOTE TO PATIENT: The 21st Century Cures Act makes medical notes like these available to patients in the interest of transparency. However, be advised this is a medical document. It is intended as peer to peer communication. It is written in medical language and may  contain abbreviations or verbiage that are unfamiliar. It may appear blunt or direct. Medical documents are intended to carry relevant information, facts as evident, and the clinical opinion of the practitioner.     PERFECTO Rosen  [facility]

## 2025-06-26 RX ORDER — DOXAZOSIN 4 MG/1
4 TABLET ORAL NIGHTLY
Qty: 90 TABLET | OUTPATIENT
Start: 2025-06-26

## 2025-06-26 RX ORDER — SERTRALINE HYDROCHLORIDE 25 MG/1
25 TABLET, FILM COATED ORAL DAILY
Qty: 90 TABLET | Refills: 3 | OUTPATIENT
Start: 2025-06-26

## 2025-06-26 RX ORDER — DOXAZOSIN 4 MG/1
4 TABLET ORAL DAILY
Qty: 90 TABLET | Refills: 3 | Status: SHIPPED | OUTPATIENT
Start: 2025-06-26

## 2025-06-26 NOTE — TELEPHONE ENCOUNTER
Rx Refill Note  Requested Prescriptions     Pending Prescriptions Disp Refills    sertraline (ZOLOFT) 25 MG tablet [Pharmacy Med Name: SERTRALINE 25MG TABLETS] 90 tablet 3     Sig: TAKE 1 TABLET BY MOUTH DAILY    doxazosin (CARDURA) 4 MG tablet [Pharmacy Med Name: DOXAZOSIN 4MG TABLETS] 90 tablet      Sig: TAKE 1 TABLET BY MOUTH EVERY NIGHT      Last office visit with prescribing clinician: 5/22/2025   Last telemedicine visit with prescribing clinician: Visit date not found   Next office visit with prescribing clinician: 8/11/2025                         Would you like a call back once the refill request has been completed: [] Yes [] No    If the office needs to give you a call back, can they leave a voicemail: [] Yes [] No    Kelly Asencio LPN  06/26/25, 08:47 EDT  \

## 2025-06-28 ENCOUNTER — HOSPITAL ENCOUNTER (EMERGENCY)
Facility: HOSPITAL | Age: 70
Discharge: HOME | End: 2025-06-28
Attending: EMERGENCY MEDICINE
Payer: MEDICARE

## 2025-06-28 ENCOUNTER — APPOINTMENT (OUTPATIENT)
Dept: CT IMAGING | Facility: HOSPITAL | Age: 70
End: 2025-06-28
Payer: MEDICARE

## 2025-06-28 VITALS
WEIGHT: 220 LBS | BODY MASS INDEX: 28.23 KG/M2 | RESPIRATION RATE: 16 BRPM | TEMPERATURE: 98.5 F | OXYGEN SATURATION: 99 % | DIASTOLIC BLOOD PRESSURE: 89 MMHG | HEIGHT: 74 IN | SYSTOLIC BLOOD PRESSURE: 162 MMHG | HEART RATE: 81 BPM

## 2025-06-28 DIAGNOSIS — R31.0 GROSS HEMATURIA: Primary | ICD-10-CM

## 2025-06-28 DIAGNOSIS — M54.50 ACUTE LOW BACK PAIN WITHOUT SCIATICA, UNSPECIFIED BACK PAIN LATERALITY: ICD-10-CM

## 2025-06-28 DIAGNOSIS — R10.30 LOWER ABDOMINAL PAIN: ICD-10-CM

## 2025-06-28 LAB
ALBUMIN SERPL-MCNC: 4.2 G/DL (ref 3.5–5.2)
ALBUMIN/GLOB SERPL: 1.6 G/DL
ALP SERPL-CCNC: 111 U/L (ref 39–117)
ALT SERPL W P-5'-P-CCNC: 22 U/L (ref 1–41)
ANION GAP SERPL CALCULATED.3IONS-SCNC: 13 MMOL/L (ref 5–15)
AST SERPL-CCNC: 25 U/L (ref 1–40)
BACTERIA UR QL AUTO: ABNORMAL /HPF
BASOPHILS # BLD AUTO: 0.05 10*3/MM3 (ref 0–0.2)
BASOPHILS NFR BLD AUTO: 0.8 % (ref 0–1.5)
BILIRUB SERPL-MCNC: 0.5 MG/DL (ref 0–1.2)
BILIRUB UR QL STRIP: ABNORMAL
BUN BLDA-MCNC: 30 MG/DL (ref 8–26)
BUN SERPL-MCNC: 28 MG/DL (ref 8–23)
BUN/CREAT SERPL: 11.2 (ref 7–25)
CA-I BLDA-SCNC: 1.19 MMOL/L (ref 1.2–1.32)
CALCIUM SPEC-SCNC: 9 MG/DL (ref 8.6–10.5)
CHLORIDE BLDA-SCNC: 108 MMOL/L (ref 98–109)
CHLORIDE SERPL-SCNC: 106 MMOL/L (ref 98–107)
CLARITY UR: ABNORMAL
CO2 BLDA-SCNC: 20 MMOL/L (ref 24–29)
CO2 SERPL-SCNC: 20 MMOL/L (ref 22–29)
COLOR UR: ABNORMAL
CREAT BLDA-MCNC: 2.9 MG/DL (ref 0.6–1.3)
CREAT SERPL-MCNC: 2.5 MG/DL (ref 0.76–1.27)
DEPRECATED RDW RBC AUTO: 46.2 FL (ref 37–54)
EGFRCR SERPLBLD CKD-EPI 2021: 22.6 ML/MIN/1.73
EGFRCR SERPLBLD CKD-EPI 2021: 27 ML/MIN/1.73
EOSINOPHIL # BLD AUTO: 0.07 10*3/MM3 (ref 0–0.4)
EOSINOPHIL NFR BLD AUTO: 1.1 % (ref 0.3–6.2)
ERYTHROCYTE [DISTWIDTH] IN BLOOD BY AUTOMATED COUNT: 13.7 % (ref 12.3–15.4)
GLOBULIN UR ELPH-MCNC: 2.7 GM/DL
GLUCOSE BLDC GLUCOMTR-MCNC: 97 MG/DL (ref 70–130)
GLUCOSE SERPL-MCNC: 93 MG/DL (ref 65–99)
GLUCOSE UR STRIP-MCNC: NEGATIVE MG/DL
HCT VFR BLD AUTO: 41.1 % (ref 37.5–51)
HCT VFR BLDA CALC: 43 % (ref 38–51)
HGB BLD-MCNC: 13.5 G/DL (ref 13–17.7)
HGB BLDA-MCNC: 14.6 G/DL (ref 12–17)
HGB UR QL STRIP.AUTO: ABNORMAL
HYALINE CASTS UR QL AUTO: ABNORMAL /LPF
IMM GRANULOCYTES # BLD AUTO: 0.02 10*3/MM3 (ref 0–0.05)
IMM GRANULOCYTES NFR BLD AUTO: 0.3 % (ref 0–0.5)
KETONES UR QL STRIP: ABNORMAL
LEUKOCYTE ESTERASE UR QL STRIP.AUTO: ABNORMAL
LIPASE SERPL-CCNC: 38 U/L (ref 13–60)
LYMPHOCYTES # BLD AUTO: 1.35 10*3/MM3 (ref 0.7–3.1)
LYMPHOCYTES NFR BLD AUTO: 20.3 % (ref 19.6–45.3)
MCH RBC QN AUTO: 29.9 PG (ref 26.6–33)
MCHC RBC AUTO-ENTMCNC: 32.8 G/DL (ref 31.5–35.7)
MCV RBC AUTO: 90.9 FL (ref 79–97)
MONOCYTES # BLD AUTO: 0.65 10*3/MM3 (ref 0.1–0.9)
MONOCYTES NFR BLD AUTO: 9.8 % (ref 5–12)
NEUTROPHILS NFR BLD AUTO: 4.52 10*3/MM3 (ref 1.7–7)
NEUTROPHILS NFR BLD AUTO: 67.7 % (ref 42.7–76)
NITRITE UR QL STRIP: NEGATIVE
NRBC BLD AUTO-RTO: 0 /100 WBC (ref 0–0.2)
PH UR STRIP.AUTO: 6 [PH] (ref 5–8)
PLATELET # BLD AUTO: 220 10*3/MM3 (ref 140–450)
PMV BLD AUTO: 9.1 FL (ref 6–12)
POTASSIUM BLDA-SCNC: 3.6 MMOL/L (ref 3.5–4.9)
POTASSIUM SERPL-SCNC: 3.8 MMOL/L (ref 3.5–5.2)
PROT SERPL-MCNC: 6.9 G/DL (ref 6–8.5)
PROT UR QL STRIP: ABNORMAL
RBC # BLD AUTO: 4.52 10*6/MM3 (ref 4.14–5.8)
RBC # UR STRIP: ABNORMAL /HPF
REF LAB TEST METHOD: ABNORMAL
SODIUM BLD-SCNC: 142 MMOL/L (ref 138–146)
SODIUM SERPL-SCNC: 139 MMOL/L (ref 136–145)
SP GR UR STRIP: 1.02 (ref 1–1.03)
SQUAMOUS #/AREA URNS HPF: ABNORMAL /HPF
UROBILINOGEN UR QL STRIP: ABNORMAL
WBC # UR STRIP: ABNORMAL /HPF
WBC NRBC COR # BLD AUTO: 6.66 10*3/MM3 (ref 3.4–10.8)

## 2025-06-28 PROCEDURE — 36415 COLL VENOUS BLD VENIPUNCTURE: CPT

## 2025-06-28 PROCEDURE — 81001 URINALYSIS AUTO W/SCOPE: CPT | Performed by: EMERGENCY MEDICINE

## 2025-06-28 PROCEDURE — 80047 BASIC METABLC PNL IONIZED CA: CPT

## 2025-06-28 PROCEDURE — 25010000002 CEFTRIAXONE PER 250 MG: Performed by: EMERGENCY MEDICINE

## 2025-06-28 PROCEDURE — 99284 EMERGENCY DEPT VISIT MOD MDM: CPT

## 2025-06-28 PROCEDURE — 87086 URINE CULTURE/COLONY COUNT: CPT | Performed by: EMERGENCY MEDICINE

## 2025-06-28 PROCEDURE — 51702 INSERT TEMP BLADDER CATH: CPT

## 2025-06-28 PROCEDURE — 80053 COMPREHEN METABOLIC PANEL: CPT | Performed by: EMERGENCY MEDICINE

## 2025-06-28 PROCEDURE — 74176 CT ABD & PELVIS W/O CONTRAST: CPT

## 2025-06-28 PROCEDURE — 85014 HEMATOCRIT: CPT

## 2025-06-28 PROCEDURE — 25810000003 SODIUM CHLORIDE 0.9 % SOLUTION: Performed by: EMERGENCY MEDICINE

## 2025-06-28 PROCEDURE — 83690 ASSAY OF LIPASE: CPT | Performed by: EMERGENCY MEDICINE

## 2025-06-28 PROCEDURE — 96365 THER/PROPH/DIAG IV INF INIT: CPT

## 2025-06-28 PROCEDURE — 85025 COMPLETE CBC W/AUTO DIFF WBC: CPT | Performed by: EMERGENCY MEDICINE

## 2025-06-28 PROCEDURE — 51798 US URINE CAPACITY MEASURE: CPT

## 2025-06-28 RX ORDER — LIDOCAINE HYDROCHLORIDE 20 MG/ML
JELLY TOPICAL AS NEEDED
Status: DISCONTINUED | OUTPATIENT
Start: 2025-06-28 | End: 2025-06-29 | Stop reason: HOSPADM

## 2025-06-28 RX ORDER — ACETAMINOPHEN 500 MG
1000 TABLET ORAL ONCE
Status: DISCONTINUED | OUTPATIENT
Start: 2025-06-28 | End: 2025-06-29 | Stop reason: HOSPADM

## 2025-06-28 RX ORDER — ACETAMINOPHEN 500 MG
1000 TABLET ORAL EVERY 6 HOURS PRN
Qty: 30 TABLET | Refills: 0 | Status: SHIPPED | OUTPATIENT
Start: 2025-06-28

## 2025-06-28 RX ORDER — MORPHINE SULFATE 4 MG/ML
4 INJECTION, SOLUTION INTRAMUSCULAR; INTRAVENOUS ONCE
Status: DISCONTINUED | OUTPATIENT
Start: 2025-06-28 | End: 2025-06-29 | Stop reason: HOSPADM

## 2025-06-28 RX ADMIN — SODIUM CHLORIDE 1000 MG: 900 INJECTION INTRAVENOUS at 21:56

## 2025-06-28 RX ADMIN — SODIUM CHLORIDE 500 ML: 9 INJECTION, SOLUTION INTRAVENOUS at 21:56

## 2025-06-28 RX ADMIN — LIDOCAINE HYDROCHLORIDE: 20 JELLY TOPICAL at 20:58

## 2025-06-28 NOTE — ED PROVIDER NOTES
Subjective   History of Present Illness  Patient is a 69-year-old male presenting to the emergency department with difficulty with urination and gross hematuria.  Patient reports the symptoms started earlier this week.  He reports he followed up with his urologist and they did some In-N-Out catheterization since that time.  Patient reports he is able to urinate yesterday without difficulty.  Today he reports gross blood with pressure in the lower abdomen radiating through to the back.  Patient reports difficulty with stream and very little output.  The patient does have a history of chronic kidney disease as well as nephrolithiasis.    History provided by:  Patient and spouse      Review of Systems    Past Medical History:   Diagnosis Date    Allergic     Biaxin    Back problem     Benign prostate hyperplasia     Glaucoma     Hypertension     Nephrolithiasis     CRISTINE (obstructive sleep apnea)     Renal insufficiency        Allergies   Allergen Reactions    Erythromycin Unknown - Low Severity     erythromycin    Clarithromycin Other (See Comments) and Unknown - Low Severity     Unknown;  BIAXIN    Biaxin       Past Surgical History:   Procedure Laterality Date    COLONOSCOPY      Multiple times    EYE SURGERY      Lasix    WISDOM TOOTH EXTRACTION         Family History   Problem Relation Age of Onset    Hypertension Mother     Cancer Mother     Diabetes Father     Cancer Father     Hypertension Brother     Cancer Maternal Grandfather     Coronary artery disease Paternal Grandmother     Stroke Paternal Grandfather        Social History     Socioeconomic History    Marital status:    Tobacco Use    Smoking status: Never     Passive exposure: Never    Smokeless tobacco: Never   Vaping Use    Vaping status: Never Used   Substance and Sexual Activity    Alcohol use: Never    Drug use: Never    Sexual activity: Not Currently     Partners: Female           Objective   Physical Exam  Vitals and nursing note reviewed.    Constitutional:       General: He is not in acute distress.     Appearance: Normal appearance. He is not toxic-appearing.   Pulmonary:      Effort: No respiratory distress.   Abdominal:      Palpations: Abdomen is soft.      Tenderness: There is abdominal tenderness in the suprapubic area. There is no guarding.   Neurological:      Mental Status: He is alert and oriented to person, place, and time.   Psychiatric:         Mood and Affect: Mood normal.         Behavior: Behavior normal.         Procedures           ED Course  ED Course as of 06/28/25 2255   Sat Jun 28, 2025   1801 CBC & Differential  CBC reviewed and within normal limits [RS]   1823 Creatinine(!): 2.50  Stable chronic kidney disease when compared to multiple prior values [RS]   2016 Urinalysis, Microscopic Only - Urine, Clean Catch(!)  Gross hematuria noted [RS]   2021 CT Abdomen Pelvis Without Contrast  I reviewed the CT scan.  Based on measurements I made the volume of the bladder is currently around 230 cc.  There is hyper dense clot noted on my interpretation.  Solitary left kidney noted. [RS]   214 Patient is resting overall comfortably.  I updated the patient and family as well as a daughter who is up with and healthcare over the phone.  Patient with gross hematuria.  Patient has low back pain with MRI scheduled for Monday.  The patient has no saddle paresthesias, bowel incontinence, or neurologic based urinary retention.  Patient does have the gross hematuria with outlet obstruction.  This is likely from the prostate and prostatitis is within the differential.  Will discharge the patient home follow-up with urology. I have reviewed results, considerations, and diagnosis with the patient and/or their representative. Anticipatory guidance provided. Follow-up plan reviewed. Precautions for acute return for re-evaluations also reviewed. This including potential for worsening of the presenting condition and need for further evaluation, admission,  and/or intervention as indicated. Opportunity to as questions provided. I advised them to return for any concerns and stressed the importance of timely follow-up and outpatient services. They verbalized understanding.   [RS]   7431 Note to patient: The 21st Century Cures Act makes medical notes like these available to patients in the interest of transparency. However, be advised this is a medical document. It is intended as peer to peer communication. It is written in medical language and may contain abbreviations or verbiage that are unfamiliar. It may appear blunt or direct. Medical documents are intended to carry relevant information, facts as evident, and the clinical opinion of the physician/NPP.   [RS]      ED Course User Index  [RS] Geremias Campbell MD                                                       Medical Decision Making  Problems Addressed:  Acute low back pain without sciatica, unspecified back pain laterality: complicated acute illness or injury  Gross hematuria: complicated acute illness or injury  Lower abdominal pain: complicated acute illness or injury    Amount and/or Complexity of Data Reviewed  Independent Historian: spouse  External Data Reviewed: labs.  Labs: ordered. Decision-making details documented in ED Course.  Radiology: ordered. Decision-making details documented in ED Course.    Risk  OTC drugs.  Prescription drug management.  Decision regarding hospitalization.        Final diagnoses:   Gross hematuria   Lower abdominal pain   Acute low back pain without sciatica, unspecified back pain laterality       ED Disposition  ED Disposition       ED Disposition   Discharge    Condition   Stable    Comment   --               Josh Ortiz MD  [address] [facility] [address]  [place] [id number] [phone]    Schedule an appointment as soon as possible for a visit   As soon as possible.    Jamil Matta MD  [address]  [place] [id number] [phone]    Call in 2  days  As soon as possible.    [facility] EMERGENCY DEPARTMENT  [address]  [facility] [address] [phone]    As needed, If symptoms worsen or ANY concerns.         Medication List        New Prescriptions      acetaminophen 500 MG tablet  Commonly known as: TYLENOL  Take 2 tablets by mouth Every 6 (Six) Hours As Needed for Mild Pain or Moderate Pain.               Where to Get Your Medications        These medications were sent to [id number] - [place], [place] - [address] AT [facility] - [phone] [facility] [phone]   [address] [facility] [address]      Phone: [phone]   acetaminophen 500 MG tablet            Geremias Campbell MD  06/28/25 3820

## 2025-06-29 ENCOUNTER — HOSPITAL ENCOUNTER (EMERGENCY)
Facility: HOSPITAL | Age: 70
Discharge: HOME | End: 2025-06-29
Attending: EMERGENCY MEDICINE | Admitting: EMERGENCY MEDICINE
Payer: MEDICARE

## 2025-06-29 VITALS
DIASTOLIC BLOOD PRESSURE: 82 MMHG | HEART RATE: 83 BPM | WEIGHT: 220.02 LBS | HEIGHT: 74 IN | RESPIRATION RATE: 18 BRPM | TEMPERATURE: 97.9 F | BODY MASS INDEX: 28.24 KG/M2 | OXYGEN SATURATION: 97 % | SYSTOLIC BLOOD PRESSURE: 157 MMHG

## 2025-06-29 DIAGNOSIS — R31.0 GROSS HEMATURIA: ICD-10-CM

## 2025-06-29 DIAGNOSIS — T83.091A OBSTRUCTED FOLEY CATHETER, INITIAL ENCOUNTER: Primary | ICD-10-CM

## 2025-06-29 PROCEDURE — 99283 EMERGENCY DEPT VISIT LOW MDM: CPT

## 2025-06-29 PROCEDURE — 99282 EMERGENCY DEPT VISIT SF MDM: CPT

## 2025-06-29 NOTE — ED PROVIDER NOTES
Subjective   History of Present Illness  69 year old male presents to the emergency department accompanied by his wife after he was recently discharged from our emergency department with an indwelling urinary catheter. His wife tried changing the tubing and changing the bag from the leg bag to the larger bag. After that, there was bloody urine coming from around the catheter from the urethral meatus, and no bloody urine seen in the tubing. Also the sticker on his thigh came off and he is asking for another one to replace it.       Review of Systems   Constitutional:  Negative for diaphoresis and fever.   HENT:  Negative for facial swelling and nosebleeds.    Eyes:  Negative for photophobia and discharge.   Respiratory:  Negative for stridor.    Genitourinary:  Positive for difficulty urinating and hematuria.   Neurological:  Negative for speech difficulty.       Past Medical History:   Diagnosis Date    Allergic     Biaxin    Back problem     Benign prostate hyperplasia     Glaucoma     Hypertension     Nephrolithiasis     CRISTINE (obstructive sleep apnea)     Renal insufficiency        Allergies   Allergen Reactions    Erythromycin Unknown - Low Severity     erythromycin    Clarithromycin Other (See Comments) and Unknown - Low Severity     Unknown;  BIAXIN    Biaxin       Past Surgical History:   Procedure Laterality Date    COLONOSCOPY      Multiple times    EYE SURGERY      Lasix    WISDOM TOOTH EXTRACTION         Family History   Problem Relation Age of Onset    Hypertension Mother     Cancer Mother     Diabetes Father     Cancer Father     Hypertension Brother     Cancer Maternal Grandfather     Coronary artery disease Paternal Grandmother     Stroke Paternal Grandfather        Social History     Socioeconomic History    Marital status:    Tobacco Use    Smoking status: Never     Passive exposure: Never    Smokeless tobacco: Never   Vaping Use    Vaping status: Never Used   Substance and Sexual Activity     Alcohol use: Never    Drug use: Never    Sexual activity: Not Currently     Partners: Female           Objective   Physical Exam  Vitals and nursing note reviewed.   Constitutional:       General: He is not in acute distress.     Appearance: He is not diaphoretic.      Comments: BMI 28.   HENT:      Mouth/Throat:      Comments: Moist mucosa without pallor.   Eyes:      General: No scleral icterus.     Comments: No photophobia.   Pulmonary:      Effort: Pulmonary effort is normal. No respiratory distress.      Breath sounds: No stridor.   Genitourinary:     Comments: Circumcised. Thornton catheter in place, no blood noted at urethral meatus on my exam.   Skin:     General: Skin is warm and dry.      Coloration: Skin is not pale.   Neurological:      Mental Status: He is alert.      Comments: Normal speech, no dysarthria.                     ED Course  ED Course as of 06/29/25 0147   Sun Jun 29, 2025   0129 RN states she flushed the Thornton and the blood-tinged urine is flowing well now with no clots or problems. [LD]      ED Course User Index  [LD] Ariane Earl MD                                           I re-examined the patient and it appears the blood-tinged urine is flowing well now without clots or problems.             Medical Decision Making  Differential diagnosis includes obstructed urinary catheter, hematuria, urinary retention, and others. I considered continuous bladder irrigation but the urine is flowing well after the RN flushed the catheter. CBI seems unnecessary at this time.     Problems Addressed:  Gross hematuria: acute illness or injury  Obstructed Thornton catheter, initial encounter: acute illness or injury    Amount and/or Complexity of Data Reviewed  Independent Historian: spouse  External Data Reviewed: labs, radiology and notes.     Details: CT 6/28/25 report reviewed. ER note 6/28/25 reviewed. Creatinine 2.5 6/28 at 5:43 PM.   Labs:      Details: The labs that pulled over into my note are  from the prior visit within the past 24 hours, patient was seen by Dr. Campbell  in our ER within the past 24 hours.       Recent Results (from the past 24 hours)   Comprehensive Metabolic Panel    Collection Time: 06/28/25  5:43 PM    Specimen: Blood   Result Value Ref Range    Glucose 93 65 - 99 mg/dL    BUN 28.0 (H) 8.0 - 23.0 mg/dL    Creatinine 2.50 (H) 0.76 - 1.27 mg/dL    Sodium 139 136 - 145 mmol/L    Potassium 3.8 3.5 - 5.2 mmol/L    Chloride 106 98 - 107 mmol/L    CO2 20.0 (L) 22.0 - 29.0 mmol/L    Calcium 9.0 8.6 - 10.5 mg/dL    Total Protein 6.9 6.0 - 8.5 g/dL    Albumin 4.2 3.5 - 5.2 g/dL    ALT (SGPT) 22 1 - 41 U/L    AST (SGOT) 25 1 - 40 U/L    Alkaline Phosphatase 111 39 - 117 U/L    Total Bilirubin 0.5 0.0 - 1.2 mg/dL    Globulin 2.7 gm/dL    A/G Ratio 1.6 g/dL    BUN/Creatinine Ratio 11.2 7.0 - 25.0    Anion Gap 13.0 5.0 - 15.0 mmol/L    eGFR 27.0 (L) >60.0 mL/min/1.73   Lipase    Collection Time: 06/28/25  5:43 PM    Specimen: Blood   Result Value Ref Range    Lipase 38 13 - 60 U/L   CBC Auto Differential    Collection Time: 06/28/25  5:43 PM    Specimen: Blood   Result Value Ref Range    WBC 6.66 3.40 - 10.80 10*3/mm3    RBC 4.52 4.14 - 5.80 10*6/mm3    Hemoglobin 13.5 13.0 - 17.7 g/dL    Hematocrit 41.1 37.5 - 51.0 %    MCV 90.9 79.0 - 97.0 fL    MCH 29.9 26.6 - 33.0 pg    MCHC 32.8 31.5 - 35.7 g/dL    RDW 13.7 12.3 - 15.4 %    RDW-SD 46.2 37.0 - 54.0 fl    MPV 9.1 6.0 - 12.0 fL    Platelets 220 140 - 450 10*3/mm3    Neutrophil % 67.7 42.7 - 76.0 %    Lymphocyte % 20.3 19.6 - 45.3 %    Monocyte % 9.8 5.0 - 12.0 %    Eosinophil % 1.1 0.3 - 6.2 %    Basophil % 0.8 0.0 - 1.5 %    Immature Grans % 0.3 0.0 - 0.5 %    Neutrophils, Absolute 4.52 1.70 - 7.00 10*3/mm3    Lymphocytes, Absolute 1.35 0.70 - 3.10 10*3/mm3    Monocytes, Absolute 0.65 0.10 - 0.90 10*3/mm3    Eosinophils, Absolute 0.07 0.00 - 0.40 10*3/mm3    Basophils, Absolute 0.05 0.00 - 0.20 10*3/mm3    Immature Grans, Absolute 0.02 0.00 -  0.05 10*3/mm3    nRBC 0.0 0.0 - 0.2 /100 WBC   Urinalysis With Culture If Indicated - Urine, Clean Catch    Collection Time: 06/28/25  7:03 PM    Specimen: Urine, Clean Catch   Result Value Ref Range    Color, UA Red (A) Yellow, Straw    Appearance, UA Slightly Cloudy (A) Clear    pH, UA 6.0 5.0 - 8.0    Specific Gravity, UA 1.025 1.005 - 1.030    Glucose, UA Negative Negative    Ketones, UA Trace (A) Negative    Bilirubin, UA Small (1+) (A) Negative    Blood, UA Large (3+) (A) Negative    Protein, UA >=300 mg/dL (3+) (A) Negative    Leuk Esterase, UA Trace (A) Negative    Nitrite, UA Negative Negative    Urobilinogen, UA 0.2 E.U./dL 0.2 - 1.0 E.U./dL   Urinalysis, Microscopic Only - Urine, Clean Catch    Collection Time: 06/28/25  7:03 PM    Specimen: Urine, Clean Catch   Result Value Ref Range    RBC, UA Too Numerous to Count (A) None Seen, 0-2 /HPF    WBC, UA Unable to determine due to loaded field (A) None Seen, 0-2 /HPF    Bacteria, UA Unable to determine due to loaded field (A) None Seen, Trace /HPF    Squamous Epithelial Cells, UA Unable to determine due to loaded field (A) None Seen, 0-2 /HPF    Hyaline Casts, UA Unable to determine due to loaded field 0 - 6 /LPF    Methodology Automated Microscopy    POC CHEM 8    Collection Time: 06/28/25  8:48 PM    Specimen: Blood   Result Value Ref Range    Glucose 97 70 - 130 mg/dL    BUN 30 (H) 8 - 26 mg/dL    Creatinine 2.90 (H) 0.60 - 1.30 mg/dL    Sodium 142 138 - 146 mmol/L    POC Potassium 3.6 3.5 - 4.9 mmol/L    Chloride 108 98 - 109 mmol/L    Total CO2 20 (L) 24 - 29 mmol/L    Hemoglobin 14.6 12.0 - 17.0 g/dL    Hematocrit 43 38 - 51 %    Ionized Calcium 1.19 (L) 1.20 - 1.32 mmol/L    eGFR 22.6 (L) >60.0 mL/min/1.73     Note: In addition to lab results from this visit, the labs listed above may include labs taken at another facility or during a different encounter within the last 24 hours. Please correlate lab times with ED admission and discharge times for  further clarification of the services performed during this visit.    No orders to display     Vitals:    06/29/25 0100 06/29/25 0130 06/29/25 0138 06/29/25 0139   BP:   157/82    Pulse: 83 84  95   Resp:       Temp:       SpO2: 98% 98%  97%   Weight:       Height:         Medications - No data to display  ECG/EMG Results (last 24 hours)       ** No results found for the last 24 hours. **          No orders to display           Final diagnoses:   Obstructed Thornton catheter, initial encounter   Gross hematuria       ED Disposition  ED Disposition       ED Disposition   Discharge    Condition   Stable    Comment   --               Josh Ortiz MD  [address] [place] [address]  [phone]    Schedule an appointment as soon as possible for a visit in 1 week           Medication List      No changes were made to your prescriptions during this visit.            Ariane Earl MD  07/02/25 7113

## 2025-06-30 ENCOUNTER — HOSPITAL ENCOUNTER (OUTPATIENT)
Dept: MRI IMAGING | Facility: HOSPITAL | Age: 70
Discharge: HOME | End: 2025-06-30
Admitting: INTERNAL MEDICINE
Payer: MEDICARE

## 2025-06-30 ENCOUNTER — PATIENT OUTREACH (OUTPATIENT)
Dept: CASE MANAGEMENT | Facility: OTHER | Age: 70
End: 2025-06-30
Payer: MEDICARE

## 2025-06-30 DIAGNOSIS — M48.061 SPINAL STENOSIS OF LUMBAR REGION WITHOUT NEUROGENIC CLAUDICATION: ICD-10-CM

## 2025-06-30 DIAGNOSIS — F41.1 GENERALIZED ANXIETY DISORDER: ICD-10-CM

## 2025-06-30 DIAGNOSIS — N18.32 STAGE 3B CHRONIC KIDNEY DISEASE: ICD-10-CM

## 2025-06-30 DIAGNOSIS — K58.0 IRRITABLE BOWEL SYNDROME WITH DIARRHEA: Primary | ICD-10-CM

## 2025-06-30 DIAGNOSIS — E78.2 MIXED HYPERLIPIDEMIA: ICD-10-CM

## 2025-06-30 DIAGNOSIS — I10 PRIMARY HYPERTENSION: ICD-10-CM

## 2025-06-30 LAB — BACTERIA SPEC AEROBE CULT: NO GROWTH

## 2025-06-30 PROCEDURE — 72148 MRI LUMBAR SPINE W/O DYE: CPT

## 2025-06-30 NOTE — OUTREACH NOTE
[facility] End of Month Documentation    This Chronic Medical Management Care Plan for Donta Ledesma Jr., 69 y.o. male, has been a new plan of care implemented; established and a new plan of care implemented for the month of June.  A cumulative time of 27  minutes was spent on this patient record this month, including chart review; phone call with patient; electronic communication with primary care provider.    Regarding the patient's problems: has Irritable bowel syndrome; Annual physical exam; Mixed hyperlipidemia; Primary hypertension; Chronic kidney disease, stage III (moderate); Sleep apnea; Disc disorder of cervical region; Prediabetes; Major depressive disorder with single episode, in full remission; and Generalized anxiety disorder on their problem list., the following items were addressed: medications and any changes can be found within the plan section of the note.  A detailed listing of time spent for chronic care management is tracked within each outreach encounter.  Current medications include:  has a current medication list which includes the following prescription(s): acetaminophen, amlodipine, atorvastatin, bupropion xl, vitamin d3, doxazosin, ondansetron odt, promethazine, sertraline, and sod picosulfate-mag ox-cit acd. and the patient is reported to be patient is compliant with medication protocol,  Medications are reported to be effective.  Regarding these diagnoses, referrals were made to the following provider(s):  .  All notes on chart for PCP to review.    The patient was monitored remotely for medications.    The patient's physical needs include:  medication education.     The patient's mental support needs include:  continued support    The patient's cognitive support needs include:  continued support    The patient's psychosocial support needs include:  continued support    The patient's functional needs include: medication education    The patient's environmental needs include:  not  applicable    Care Plan overall comments:  No data recorded    Refer to previous outreach notes for more information on the areas listed above.    Monthly Billing Diagnoses  (K58.0) Irritable bowel syndrome with diarrhea    (E78.2) Mixed hyperlipidemia    (N18.32) Stage 3b chronic kidney disease    (I10) Primary hypertension    (F41.1) Generalized anxiety disorder    Medications   Medications have been reconciled    Care Plan progress this month:      Recently Modified Care Plans Updates made since 5/30/2025 12:00 AM      No recently modified care plans.             Anxiety       Track My Symptoms (Progressing)       Start:  06/03/25    Expected End:  06/03/27         My Anxiety Symptom Tracking To Do List       Start:  06/03/25       Why is this important?Tracking symptoms can help manage anxiety.           Chronic Kidney       Track and Manage My Symptoms (Progressing)       Start:  06/03/25    Expected End:  06/03/27         My Symptom Management To Do List       Start:  06/03/25       Why is this important?Keeping track of symptoms can help you feel the best. It also helps the doctor stay on top of any changes to the disease. It may also help keep your disease from getting worse. Taking simple steps can help you cope withsymptoms like feeling very tired or itchy skin.           Fall Risk       Prevent Falls and Injury (Progressing)       Start:  06/03/25    Expected End:  06/03/27         My Fall Prevention To Do List       Start:  06/03/25       Why is this important?Most falls happen when it is hard for you to walk safely. Your balance may be off because of an illness. You may have pain in your knees, hip or other joints.You may be overly tired or taking medicines that make you sleepy. You may not be able to see or hear clearly.Falls can lead to broken bones, bruises or other injuries.There are things you can do to help prevent falling.           Hypertension       Track and Manage My Blood Pressure  (Progressing)       Start:  06/03/25    Expected End:  06/03/27         My Blood Pressure Management To Do List       Start:  06/03/25       Why is this important?You may not be able to feel high blood pressure, but it can still hurt your body.High blood pressure can cause heart or kidney problems. It can also cause a stroke.Checking your blood pressure at home and at different times of the day can help to control blood pressure.             Current Specialty Plan of Care Status signed by both patient and provider    Instructions   Patient was provided an electronic copy of care plan  CCM services were explained and offered and patient has accepted these services.  Patient has given their written consent to receive CCM services and understands that this includes the authorization of electronic communication of medical information with the other treating providers.  Patient understands that they may stop CCM services at any time and these changes will be effective at the end of the calendar month and will effectively revocate the agreement of CCM services.  Patient understands that only one practitioner can furnish and be paid for CCM services during one calendar month.  Patient also understands that there may be co-payment or deductible fees in association with CCM services.  Patient will continue with at least monthly follow-up calls with the Ambulatory .    Ashlyn MCCABE  Ambulatory Case Management    6/30/2025, 10:35 EDT

## 2025-07-01 DIAGNOSIS — G47.01 INSOMNIA DUE TO MEDICAL CONDITION: Primary | ICD-10-CM

## 2025-07-01 RX ORDER — ZOLPIDEM TARTRATE 5 MG/1
5 TABLET ORAL NIGHTLY PRN
Qty: 30 TABLET | Refills: 0 | Status: SHIPPED | OUTPATIENT
Start: 2025-07-01

## 2025-07-05 ENCOUNTER — HOSPITAL ENCOUNTER (EMERGENCY)
Facility: HOSPITAL | Age: 70
Discharge: HOME | End: 2025-07-05
Attending: EMERGENCY MEDICINE
Payer: MEDICARE

## 2025-07-05 VITALS
TEMPERATURE: 97.4 F | SYSTOLIC BLOOD PRESSURE: 147 MMHG | WEIGHT: 220 LBS | HEART RATE: 75 BPM | OXYGEN SATURATION: 97 % | RESPIRATION RATE: 16 BRPM | HEIGHT: 74 IN | DIASTOLIC BLOOD PRESSURE: 80 MMHG | BODY MASS INDEX: 28.23 KG/M2

## 2025-07-05 DIAGNOSIS — Z87.438 HISTORY OF BPH: ICD-10-CM

## 2025-07-05 DIAGNOSIS — N18.9 CHRONIC KIDNEY DISEASE, UNSPECIFIED CKD STAGE: ICD-10-CM

## 2025-07-05 DIAGNOSIS — R33.9 URINARY RETENTION: Primary | ICD-10-CM

## 2025-07-05 DIAGNOSIS — Z90.5 HISTORY OF RIGHT NEPHRECTOMY: ICD-10-CM

## 2025-07-05 DIAGNOSIS — R31.9 HEMATURIA, UNSPECIFIED TYPE: ICD-10-CM

## 2025-07-05 LAB
ALBUMIN SERPL-MCNC: 3.9 G/DL (ref 3.5–5.2)
ALBUMIN/GLOB SERPL: 1.4 G/DL
ALP SERPL-CCNC: 100 U/L (ref 39–117)
ALT SERPL W P-5'-P-CCNC: 29 U/L (ref 1–41)
ANION GAP SERPL CALCULATED.3IONS-SCNC: 12 MMOL/L (ref 5–15)
AST SERPL-CCNC: 29 U/L (ref 1–40)
BACTERIA UR QL AUTO: ABNORMAL /HPF
BASOPHILS # BLD AUTO: 0.05 10*3/MM3 (ref 0–0.2)
BASOPHILS NFR BLD AUTO: 1.1 % (ref 0–1.5)
BILIRUB SERPL-MCNC: 0.5 MG/DL (ref 0–1.2)
BILIRUB UR QL STRIP: NEGATIVE
BUN SERPL-MCNC: 20.3 MG/DL (ref 8–23)
BUN/CREAT SERPL: 8.7 (ref 7–25)
CALCIUM SPEC-SCNC: 9 MG/DL (ref 8.6–10.5)
CHLORIDE SERPL-SCNC: 107 MMOL/L (ref 98–107)
CLARITY UR: CLEAR
CO2 SERPL-SCNC: 20 MMOL/L (ref 22–29)
COLOR UR: YELLOW
CREAT SERPL-MCNC: 2.33 MG/DL (ref 0.76–1.27)
DEPRECATED RDW RBC AUTO: 46.5 FL (ref 37–54)
EGFRCR SERPLBLD CKD-EPI 2021: 29.3 ML/MIN/1.73
EOSINOPHIL # BLD AUTO: 0.09 10*3/MM3 (ref 0–0.4)
EOSINOPHIL NFR BLD AUTO: 1.9 % (ref 0.3–6.2)
ERYTHROCYTE [DISTWIDTH] IN BLOOD BY AUTOMATED COUNT: 13.9 % (ref 12.3–15.4)
GLOBULIN UR ELPH-MCNC: 2.8 GM/DL
GLUCOSE SERPL-MCNC: 124 MG/DL (ref 65–99)
GLUCOSE UR STRIP-MCNC: NEGATIVE MG/DL
HCT VFR BLD AUTO: 39.1 % (ref 37.5–51)
HGB BLD-MCNC: 13.2 G/DL (ref 13–17.7)
HGB UR QL STRIP.AUTO: ABNORMAL
HOLD SPECIMEN: NORMAL
HYALINE CASTS UR QL AUTO: ABNORMAL /LPF
IMM GRANULOCYTES # BLD AUTO: 0.01 10*3/MM3 (ref 0–0.05)
IMM GRANULOCYTES NFR BLD AUTO: 0.2 % (ref 0–0.5)
KETONES UR QL STRIP: NEGATIVE
LEUKOCYTE ESTERASE UR QL STRIP.AUTO: NEGATIVE
LYMPHOCYTES # BLD AUTO: 0.97 10*3/MM3 (ref 0.7–3.1)
LYMPHOCYTES NFR BLD AUTO: 20.8 % (ref 19.6–45.3)
MCH RBC QN AUTO: 30.8 PG (ref 26.6–33)
MCHC RBC AUTO-ENTMCNC: 33.8 G/DL (ref 31.5–35.7)
MCV RBC AUTO: 91.1 FL (ref 79–97)
MONOCYTES # BLD AUTO: 0.41 10*3/MM3 (ref 0.1–0.9)
MONOCYTES NFR BLD AUTO: 8.8 % (ref 5–12)
NEUTROPHILS NFR BLD AUTO: 3.13 10*3/MM3 (ref 1.7–7)
NEUTROPHILS NFR BLD AUTO: 67.2 % (ref 42.7–76)
NITRITE UR QL STRIP: NEGATIVE
NRBC BLD AUTO-RTO: 0 /100 WBC (ref 0–0.2)
PH UR STRIP.AUTO: 5.5 [PH] (ref 5–8)
PLATELET # BLD AUTO: 197 10*3/MM3 (ref 140–450)
PMV BLD AUTO: 8.9 FL (ref 6–12)
POTASSIUM SERPL-SCNC: 3.7 MMOL/L (ref 3.5–5.2)
PROT SERPL-MCNC: 6.7 G/DL (ref 6–8.5)
PROT UR QL STRIP: NEGATIVE
RBC # BLD AUTO: 4.29 10*6/MM3 (ref 4.14–5.8)
RBC # UR STRIP: ABNORMAL /HPF
REF LAB TEST METHOD: ABNORMAL
SODIUM SERPL-SCNC: 139 MMOL/L (ref 136–145)
SP GR UR STRIP: 1.01 (ref 1–1.03)
SQUAMOUS #/AREA URNS HPF: ABNORMAL /HPF
UROBILINOGEN UR QL STRIP: ABNORMAL
WBC # UR STRIP: ABNORMAL /HPF
WBC NRBC COR # BLD AUTO: 4.66 10*3/MM3 (ref 3.4–10.8)
WHOLE BLOOD HOLD COAG: NORMAL
WHOLE BLOOD HOLD SPECIMEN: NORMAL

## 2025-07-05 PROCEDURE — 36415 COLL VENOUS BLD VENIPUNCTURE: CPT

## 2025-07-05 PROCEDURE — 99283 EMERGENCY DEPT VISIT LOW MDM: CPT

## 2025-07-05 PROCEDURE — 80053 COMPREHEN METABOLIC PANEL: CPT | Performed by: PHYSICIAN ASSISTANT

## 2025-07-05 PROCEDURE — 51702 INSERT TEMP BLADDER CATH: CPT

## 2025-07-05 PROCEDURE — 81001 URINALYSIS AUTO W/SCOPE: CPT | Performed by: PHYSICIAN ASSISTANT

## 2025-07-05 PROCEDURE — 85025 COMPLETE CBC W/AUTO DIFF WBC: CPT | Performed by: PHYSICIAN ASSISTANT

## 2025-07-05 PROCEDURE — 51798 US URINE CAPACITY MEASURE: CPT

## 2025-07-05 RX ORDER — SODIUM CHLORIDE 0.9 % (FLUSH) 0.9 %
10 SYRINGE (ML) INJECTION AS NEEDED
Status: DISCONTINUED | OUTPATIENT
Start: 2025-07-05 | End: 2025-07-05 | Stop reason: HOSPADM

## 2025-07-05 NOTE — ED PROVIDER NOTES
Subjective   History of Present Illness  Pt is a 68 yo male presenting to ED with complaints of difficulty urinating. PMHx significant for BPH, HTN, CKD, Right nephrectomy for mass, Glaucoma, Kidney stones and CRISTINE. Pt with hx of recurrent issues with urinary retention and followed by Dr. Matta. Pt recently seen in ED 6-28 for retention and had dhillon placed. He f/u with Dr. Matta 7-2 and had cath removed. He hasn't been able to urinate since last night around 10 pm. He denies blood in urine the past few days. He is on Flomax and recently finished Cefdinir. He has some lower abdominal pressure but denies significant pain. Denies flank pain, fever, N/V/D, CP, SOB or cough. He does not take blood thinners. Denies tobacco, drug or ETOH use.     History provided by:  Patient, medical records and relative      Review of Systems   Constitutional:  Negative for chills and fever.   HENT:  Negative for congestion.    Respiratory:  Negative for cough and shortness of breath.    Cardiovascular:  Negative for chest pain.   Gastrointestinal:  Negative for abdominal pain, constipation, diarrhea, nausea and vomiting.   Genitourinary:  Positive for difficulty urinating. Negative for dysuria and flank pain.   Musculoskeletal:  Negative for arthralgias and back pain.   Skin: Negative.  Negative for rash and wound.   Allergic/Immunologic: Negative.    Neurological:  Negative for dizziness, weakness and headaches.   Psychiatric/Behavioral:  Negative for confusion.    All other systems reviewed and are negative.      Past Medical History:   Diagnosis Date    Allergic     Biaxin    Back problem     Benign prostate hyperplasia     Glaucoma     Hypertension     Nephrolithiasis     CRISTINE (obstructive sleep apnea)     Renal insufficiency        Allergies   Allergen Reactions    Erythromycin Unknown - Low Severity     erythromycin    Clarithromycin Other (See Comments) and Unknown - Low Severity     Unknown;  BIAXIN    Biaxin       Past Surgical  History:   Procedure Laterality Date    COLONOSCOPY      Multiple times    EYE SURGERY      Lasix    WISDOM TOOTH EXTRACTION         Family History   Problem Relation Age of Onset    Hypertension Mother     Cancer Mother     Diabetes Father     Cancer Father     Hypertension Brother     Cancer Maternal Grandfather     Coronary artery disease Paternal Grandmother     Stroke Paternal Grandfather        Social History     Socioeconomic History    Marital status:    Tobacco Use    Smoking status: Never     Passive exposure: Never    Smokeless tobacco: Never   Vaping Use    Vaping status: Never Used   Substance and Sexual Activity    Alcohol use: Never    Drug use: Never    Sexual activity: Not Currently     Partners: Female           Objective   Physical Exam  Vitals and nursing note reviewed.   Constitutional:       Appearance: He is well-developed.   HENT:      Head: Atraumatic.      Nose: Nose normal.   Eyes:      General: Lids are normal.      Conjunctiva/sclera: Conjunctivae normal.      Pupils: Pupils are equal, round, and reactive to light.   Cardiovascular:      Rate and Rhythm: Normal rate and regular rhythm.   Pulmonary:      Effort: Pulmonary effort is normal. No respiratory distress.   Abdominal:      General: There is no distension.      Palpations: Abdomen is soft.      Tenderness: There is no abdominal tenderness. There is no right CVA tenderness, left CVA tenderness, guarding or rebound.   Musculoskeletal:         General: No deformity. Normal range of motion.      Cervical back: Normal range of motion and neck supple.   Skin:     General: Skin is warm and dry.   Neurological:      Mental Status: He is alert and oriented to person, place, and time.      Motor: No weakness.   Psychiatric:         Behavior: Behavior normal.         Procedures           ED Course  ED Course as of 07/05/25 1131   Sat Jul 05, 2025   9608 I personally reviewed and interpreted lab results and went over with patient.   I  personally reviewed and interpreted vitals   [RT]   0927 BP: 138/84  stable [RT]   0927 SpO2: 95 %  RA [RT]   0927 Temp: 97.4 °F (36.3 °C)  Normal  [RT]   0927 Bladder scan 443 mL  Ordered dhillon cath [RT]   1129 CBC & Differential  WBC 4.66, stable H and H [RT]   1129 Comprehensive Metabolic Panel(!)  Glucose 124, Cr 2.33 with CKD and at baseline [RT]   1130 Urinalysis With Microscopic If Indicated (No Culture) - Indwelling Urethral Catheter(!)  With blood but no leukocytes, nitrate, WBC or bacteria  [RT]   1130 Re-examined patient and updated on results. He is feeling better with dhillon placement. Discussed close f/u with PCP and Urologist. He is agreeable with plan and will return to ED if new / worse sx.  [RT]      ED Course User Index  [RT] Komal Rosenbaum, PA      Recent Results (from the past 24 hours)   Comprehensive Metabolic Panel    Collection Time: 07/05/25  9:28 AM    Specimen: Blood   Result Value Ref Range    Glucose 124 (H) 65 - 99 mg/dL    BUN 20.3 8.0 - 23.0 mg/dL    Creatinine 2.33 (H) 0.76 - 1.27 mg/dL    Sodium 139 136 - 145 mmol/L    Potassium 3.7 3.5 - 5.2 mmol/L    Chloride 107 98 - 107 mmol/L    CO2 20.0 (L) 22.0 - 29.0 mmol/L    Calcium 9.0 8.6 - 10.5 mg/dL    Total Protein 6.7 6.0 - 8.5 g/dL    Albumin 3.9 3.5 - 5.2 g/dL    ALT (SGPT) 29 1 - 41 U/L    AST (SGOT) 29 1 - 40 U/L    Alkaline Phosphatase 100 39 - 117 U/L    Total Bilirubin 0.5 0.0 - 1.2 mg/dL    Globulin 2.8 gm/dL    A/G Ratio 1.4 g/dL    BUN/Creatinine Ratio 8.7 7.0 - 25.0    Anion Gap 12.0 5.0 - 15.0 mmol/L    eGFR 29.3 (L) >60.0 mL/min/1.73   CBC Auto Differential    Collection Time: 07/05/25  9:28 AM    Specimen: Blood   Result Value Ref Range    WBC 4.66 3.40 - 10.80 10*3/mm3    RBC 4.29 4.14 - 5.80 10*6/mm3    Hemoglobin 13.2 13.0 - 17.7 g/dL    Hematocrit 39.1 37.5 - 51.0 %    MCV 91.1 79.0 - 97.0 fL    MCH 30.8 26.6 - 33.0 pg    MCHC 33.8 31.5 - 35.7 g/dL    RDW 13.9 12.3 - 15.4 %    RDW-SD 46.5 37.0 - 54.0 fl    MPV 8.9  6.0 - 12.0 fL    Platelets 197 140 - 450 10*3/mm3    Neutrophil % 67.2 42.7 - 76.0 %    Lymphocyte % 20.8 19.6 - 45.3 %    Monocyte % 8.8 5.0 - 12.0 %    Eosinophil % 1.9 0.3 - 6.2 %    Basophil % 1.1 0.0 - 1.5 %    Immature Grans % 0.2 0.0 - 0.5 %    Neutrophils, Absolute 3.13 1.70 - 7.00 10*3/mm3    Lymphocytes, Absolute 0.97 0.70 - 3.10 10*3/mm3    Monocytes, Absolute 0.41 0.10 - 0.90 10*3/mm3    Eosinophils, Absolute 0.09 0.00 - 0.40 10*3/mm3    Basophils, Absolute 0.05 0.00 - 0.20 10*3/mm3    Immature Grans, Absolute 0.01 0.00 - 0.05 10*3/mm3    nRBC 0.0 0.0 - 0.2 /100 WBC   Green Top (Gel)    Collection Time: 07/05/25  9:28 AM   Result Value Ref Range    Extra Tube Hold for add-ons.    Lavender Top    Collection Time: 07/05/25  9:28 AM   Result Value Ref Range    Extra Tube hold for add-on    Gold Top - SST    Collection Time: 07/05/25  9:28 AM   Result Value Ref Range    Extra Tube Hold for add-ons.    Gray Top    Collection Time: 07/05/25  9:28 AM   Result Value Ref Range    Extra Tube Hold for add-ons.    Light Blue Top    Collection Time: 07/05/25  9:28 AM   Result Value Ref Range    Extra Tube Hold for add-ons.    Urinalysis With Microscopic If Indicated (No Culture) - Indwelling Urethral Catheter    Collection Time: 07/05/25 10:06 AM    Specimen: Indwelling Urethral Catheter; Urine   Result Value Ref Range    Color, UA Yellow Yellow, Straw    Appearance, UA Clear Clear    pH, UA 5.5 5.0 - 8.0    Specific Gravity, UA 1.006 1.001 - 1.030    Glucose, UA Negative Negative    Ketones, UA Negative Negative    Bilirubin, UA Negative Negative    Blood, UA Large (3+) (A) Negative    Protein, UA Negative Negative    Leuk Esterase, UA Negative Negative    Nitrite, UA Negative Negative    Urobilinogen, UA 0.2 E.U./dL 0.2 - 1.0 E.U./dL   Urinalysis, Microscopic Only - Indwelling Urethral Catheter    Collection Time: 07/05/25 10:06 AM    Specimen: Indwelling Urethral Catheter; Urine   Result Value Ref Range    RBC, UA  6-10 (A) None Seen, 0-2 /HPF    WBC, UA 0-2 None Seen, 0-2 /HPF    Bacteria, UA None Seen None Seen, Trace /HPF    Squamous Epithelial Cells, UA None Seen None Seen, 0-2 /HPF    Hyaline Casts, UA 0-6 0 - 6 /LPF    Methodology Automated Microscopy      Note: In addition to lab results from this visit, the labs listed above may include labs taken at another facility or during a different encounter within the last 24 hours. Please correlate lab times with ED admission and discharge times for further clarification of the services performed during this visit.    No orders to display     Vitals:    07/05/25 0930 07/05/25 1000 07/05/25 1030 07/05/25 1100   BP: 148/74 151/87 137/77 134/71   BP Location:       Patient Position:       Pulse: 84 81 74 75   Resp:       Temp:       TempSrc:       SpO2: 95% 99% 96% 96%   Weight:       Height:         Medications   sodium chloride 0.9 % flush 10 mL (has no administration in time range)     ECG/EMG Results (last 24 hours)       ** No results found for the last 24 hours. **          No orders to display                                                        Medical Decision Making  Pt is a 68 yo male presenting to ED with complaints of difficulty urinating. I had a discussion with the patient / family regarding ED course, diagnosis, diagnostic results and treatment plan including medications and admission / discharge. Discussed if new or worse symptoms / concerns to return to ED for further evaluation. Discussed need for close follow up with PCP / specialists.     DDx  UTI, Kidney stone, Urinary retention, Prostatitis, BPH, Renal failure    Problems Addressed:  Chronic kidney disease, unspecified CKD stage: complicated acute illness or injury  Hematuria, unspecified type: complicated acute illness or injury  History of BPH: complicated acute illness or injury  History of right nephrectomy: complicated acute illness or injury  Urinary retention: complicated acute illness or  injury    Amount and/or Complexity of Data Reviewed  External Data Reviewed: notes.     Details: Reviewed previous non ED visits including prior labs, imaging, available notes, medications, allergies and surgical hx.   Urology 7-2-25 for retention f/u   Labs: ordered. Decision-making details documented in ED Course.    Risk  Prescription drug management.        Final diagnoses:   Urinary retention   History of BPH   History of right nephrectomy   Chronic kidney disease, unspecified CKD stage   Hematuria, unspecified type       ED Disposition  ED Disposition       ED Disposition   Discharge    Condition   Stable    Comment   --               Jamil Matta MD  [address] [facility] [place] [id number]  [phone]    Schedule an appointment as soon as possible for a visit       Josh Ortiz MD  [address] [facility] [address]  [place] [id number]  [phone]    Schedule an appointment as soon as possible for a visit            Medication List      No changes were made to your prescriptions during this visit.            Komal Rosenbaum PA  07/05/25 [id number]

## 2025-07-14 ENCOUNTER — OFFICE VISIT (OUTPATIENT)
Dept: INTERNAL MEDICINE | Facility: CLINIC | Age: 70
End: 2025-07-14
Payer: MEDICARE

## 2025-07-14 ENCOUNTER — LAB (OUTPATIENT)
Dept: LAB | Facility: HOSPITAL | Age: 70
End: 2025-07-14
Payer: MEDICARE

## 2025-07-14 VITALS
BODY MASS INDEX: 27.36 KG/M2 | TEMPERATURE: 98.3 F | HEART RATE: 104 BPM | SYSTOLIC BLOOD PRESSURE: 110 MMHG | WEIGHT: 213.2 LBS | HEIGHT: 74 IN | OXYGEN SATURATION: 96 % | DIASTOLIC BLOOD PRESSURE: 64 MMHG

## 2025-07-14 DIAGNOSIS — K59.00 CONSTIPATION, UNSPECIFIED CONSTIPATION TYPE: ICD-10-CM

## 2025-07-14 DIAGNOSIS — R11.0 NAUSEA: Primary | ICD-10-CM

## 2025-07-14 DIAGNOSIS — R11.0 NAUSEA: ICD-10-CM

## 2025-07-14 LAB — TSH SERPL DL<=0.05 MIU/L-ACNC: 2.98 UIU/ML (ref 0.27–4.2)

## 2025-07-14 PROCEDURE — 86231 EMA EACH IG CLASS: CPT

## 2025-07-14 PROCEDURE — 1159F MED LIST DOCD IN RCRD: CPT | Performed by: STUDENT IN AN ORGANIZED HEALTH CARE EDUCATION/TRAINING PROGRAM

## 2025-07-14 PROCEDURE — 86364 TISS TRNSGLTMNASE EA IG CLAS: CPT

## 2025-07-14 PROCEDURE — 3074F SYST BP LT 130 MM HG: CPT | Performed by: STUDENT IN AN ORGANIZED HEALTH CARE EDUCATION/TRAINING PROGRAM

## 2025-07-14 PROCEDURE — 84443 ASSAY THYROID STIM HORMONE: CPT

## 2025-07-14 PROCEDURE — 1160F RVW MEDS BY RX/DR IN RCRD: CPT | Performed by: STUDENT IN AN ORGANIZED HEALTH CARE EDUCATION/TRAINING PROGRAM

## 2025-07-14 PROCEDURE — 1125F AMNT PAIN NOTED PAIN PRSNT: CPT | Performed by: STUDENT IN AN ORGANIZED HEALTH CARE EDUCATION/TRAINING PROGRAM

## 2025-07-14 PROCEDURE — 3078F DIAST BP <80 MM HG: CPT | Performed by: STUDENT IN AN ORGANIZED HEALTH CARE EDUCATION/TRAINING PROGRAM

## 2025-07-14 PROCEDURE — 99214 OFFICE O/P EST MOD 30 MIN: CPT | Performed by: STUDENT IN AN ORGANIZED HEALTH CARE EDUCATION/TRAINING PROGRAM

## 2025-07-14 PROCEDURE — 82784 ASSAY IGA/IGD/IGG/IGM EACH: CPT

## 2025-07-14 NOTE — PROGRESS NOTES
Office Note     Name: Donta Ledesma Jr.    : 1955     MRN: [id number]     Chief Complaint  Nausea, Constipation, Vomiting (Has not yet but feels like he is about too), and Nasal Congestion    Subjective     History of Present Illness:  Donta Ledesma Jr. is a 69 y.o. male who presents today for nausea.    Of note he was recently seen for GI consultation for nausea on .  He has been using Phenergan and Zofran as needed.  GI recommended screening EGD and colonoscopy.  Reportedly had similar symptoms about 20 years ago and was told that his symptoms were likely related to anxiety.  At that time he has presented to the ER multiple times for hematuria and difficulty urinating.  He is followed by urology and was last seen on .  He was seen in the emergency department on  due to difficulty urinating and a Thornton catheter was placed.  He has follow-up with urology tomorrow.    History of Present Illness  The patient presents for evaluation of nausea.    He has been experiencing stomach issues for approximately 6 weeks, similar to a problem he had 20 years ago. His primary symptoms include nausea, deep belching, and bloating, with occasional pain. He reports no acid reflux symptoms or vomiting. He has not noticed any changes in his diet or medication that could have triggered these symptoms. He underwent a CT scan at the emergency room within the last month, which showed no obvious cause of his nausea. His symptoms improved during a recent vacation in [place] but worsened upon his return. He was prescribed Zofran 4 mg and Phenergan, which provided some relief but were ineffective last night. He takes Zofran and Phenergan simultaneously.    He also reports constipation, a new symptom for him. Despite taking a stool softener, his bowel movements are not normal. He had a bowel movement yesterday, which was mushy due to the stool softener, but he did not take anything today. He has seen a  gastroenterologist practitioner and has a colonoscopy and EGD scheduled in six weeks.     He has a catheter in place due to prostate swelling and is scheduled to see a urologist tomorrow. He noticed blood in his urine when the catheter was inserted, but it has been clear for about eight days now. He reports no fevers or chills. He feels discomfort more in the lower abdomen area. He had a kidney removed last September.     Review of Systems:   Review of Systems   Constitutional:  Negative for chills and fever.   Gastrointestinal:  Positive for constipation and nausea. Negative for blood in stool and vomiting.       Past Medical History:   Past Medical History:   Diagnosis Date    Allergic     Biaxin    Back problem     Benign prostate hyperplasia     Glaucoma     Hypertension     Nephrolithiasis     CRISTINE (obstructive sleep apnea)     Renal insufficiency        Past Surgical History:   Past Surgical History:   Procedure Laterality Date    COLONOSCOPY      Multiple times    EYE SURGERY      Lasix    WISDOM TOOTH EXTRACTION         Family History:   Family History   Problem Relation Age of Onset    Hypertension Mother     Cancer Mother     Diabetes Father     Cancer Father     Hypertension Brother     Cancer Maternal Grandfather     Coronary artery disease Paternal Grandmother     Stroke Paternal Grandfather        Social History:   Social History     Socioeconomic History    Marital status:    Tobacco Use    Smoking status: Never     Passive exposure: Never    Smokeless tobacco: Never   Vaping Use    Vaping status: Never Used   Substance and Sexual Activity    Alcohol use: Never    Drug use: Never    Sexual activity: Not Currently     Partners: Female       Immunizations:   Immunization History   Administered Date(s) Administered    Arexvy (RSV, Adults 60+ yrs) 11/16/2023    COVID-19 (PFIZER) 12YRS+ (COMIRNATY) 10/16/2023, 12/28/2024    COVID-19 (PFIZER) BIVALENT 12+YRS 10/20/2022    COVID-19 (PFIZER) Purple Cap  Monovalent 03/03/2021, 03/26/2021, 10/14/2021    Fluad Quad 65+ 10/21/2020    Fluzone  >6mos 09/13/2018    Fluzone (or Fluarix & Flulaval for VFC) >6mos 09/28/2018    Fluzone High-Dose 65+YRS 10/03/2024    Fluzone High-Dose 65+yrs 11/18/2021, 11/03/2022, 10/30/2023    Fluzone Quad >6mos (Multi-dose) 09/19/2016, 09/13/2018    Influenza TIV (IM) 11/10/2009    Influenza, Unspecified 09/20/2017    Pneumococcal Conjugate 13-Valent (PCV13) 05/14/2020    Pneumococcal Polysaccharide (PPSV23) 06/01/2021    Tdap 02/12/2009, 06/10/2024    Zostavax 09/29/2016    Zoster, Unspecified 09/29/2016        Medications:     Current Outpatient Medications:     acetaminophen (TYLENOL) 500 MG tablet, Take 2 tablets by mouth Every 6 (Six) Hours As Needed for Mild Pain or Moderate Pain., Disp: 30 tablet, Rfl: 0    amLODIPine (NORVASC) 10 MG tablet, Take 1 tablet by mouth Daily., Disp: 90 tablet, Rfl: 3    atorvastatin (LIPITOR) 20 MG tablet, TAKE 1 TABLET BY MOUTH DAILY, Disp: 90 tablet, Rfl: 3    buPROPion XL (WELLBUTRIN XL) 300 MG 24 hr tablet, TAKE 1 TABLET BY MOUTH DAILY, Disp: 90 tablet, Rfl: 3    Cholecalciferol (Vitamin D3) 50 MCG (2000 UT) tablet, Take 1 tablet by mouth 4 (Four) Times a Week., Disp: , Rfl:     doxazosin (CARDURA) 4 MG tablet, Take 1 tablet by mouth Daily., Disp: 90 tablet, Rfl: 3    ondansetron ODT (ZOFRAN-ODT) 4 MG disintegrating tablet, Place 1 tablet on the tongue Every 8 (Eight) Hours As Needed for Nausea or Vomiting., Disp: 30 tablet, Rfl: 2    promethazine (PHENERGAN) 25 MG tablet, Take 1 tablet by mouth Every 6 (Six) Hours As Needed for Nausea or Vomiting., Disp: 20 tablet, Rfl: 0    sertraline (ZOLOFT) 50 MG tablet, Take 1 tablet by mouth Daily., Disp: 90 tablet, Rfl: 3    Sod Picosulfate-Mag Ox-Cit Acd 10-3.5-12 MG-GM -GM/160ML solution, Take 350 mL by mouth Take As Directed for 1 dose., Disp: 350 mL, Rfl: 0    zolpidem (Ambien) 5 MG tablet, Take 1 tablet by mouth At Night As Needed for Sleep., Disp: 30  "tablet, Rfl: 0    Allergies:   Allergies   Allergen Reactions    Erythromycin Unknown - Low Severity     erythromycin    Clarithromycin Other (See Comments) and Unknown - Low Severity     Unknown;  BIAXIN    Biaxin       Objective     Vital Signs  /64 (BP Location: Right arm, Patient Position: Sitting, Cuff Size: Adult)   Pulse 104   Temp 98.3 °F (36.8 °C)   Ht 188 cm (74.02\")   Wt 96.7 kg (213 lb 3.2 oz)   SpO2 96%   BMI 27.36 kg/m²   Body mass index is 27.36 kg/m².            Physical Exam  Constitutional:       Appearance: Normal appearance.   HENT:      Head: Normocephalic and atraumatic.   Eyes:      Extraocular Movements: Extraocular movements intact.      Conjunctiva/sclera: Conjunctivae normal.   Abdominal:      Palpations: Abdomen is soft. There is no mass.      Tenderness: There is no abdominal tenderness. There is no guarding or rebound.   Neurological:      General: No focal deficit present.      Mental Status: He is alert and oriented to person, place, and time.   Psychiatric:         Mood and Affect: Mood normal.         Behavior: Behavior normal.            Assessment and Plan     Assessment/Plan:  Diagnoses and all orders for this visit:    1. Nausea (Primary)  - Etiology of nausea unknown.  Differential diagnosis includes gastroenteritis, constipation, IBS.  He has been evaluated by GI on 6/24 and is currently scheduled to have EGD and colonoscopy for further workup at the end of August.  He has been taking Zofran and Phenergan as prescribed by gastroenterology.  - CT abdomen has been completed in the emergency department without any obvious cause of his nausea.  - No current concern for bowel obstruction as he is still having bowel movements.  - Recommend continuing as needed antiemetics as prescribed by GI and follow-up for scope.  Could trial low FODMAP eating plan to see if this improves some of his symptoms.    2. Constipation, unspecified constipation type  - Reports recent onset " of constipation, unusual for him.  - Discussed the possibility of constipation contributing to nausea.  - Advised to continue using senna and MiraLAX together for constipation relief.    Follow Up  No follow-ups on file.    Patient or patient representative verbalized consent for the use of Ambient Listening during the visit with  Bhavya Davidson MD for chart documentation. 7/14/2025  11:56 EDT      Bhavya Davidson MD   [facility] [place] [address]

## 2025-07-15 LAB
ENDOMYSIUM IGA SER QL: NEGATIVE
IGA SERPL-MCNC: 261 MG/DL (ref 61–437)
TTG IGA SER-ACNC: <2 U/ML (ref 0–3)

## 2025-07-16 ENCOUNTER — OFFICE VISIT (OUTPATIENT)
Dept: NEUROSURGERY | Facility: CLINIC | Age: 70
End: 2025-07-16
Payer: MEDICARE

## 2025-07-16 VITALS — TEMPERATURE: 98 F | HEIGHT: 74 IN | BODY MASS INDEX: 27.49 KG/M2 | WEIGHT: 214.2 LBS

## 2025-07-16 DIAGNOSIS — R20.0 BILATERAL LEG NUMBNESS: ICD-10-CM

## 2025-07-16 DIAGNOSIS — M47.14 THORACIC MYELOPATHY: ICD-10-CM

## 2025-07-16 DIAGNOSIS — M48.062 SPINAL STENOSIS OF LUMBAR REGION WITH NEUROGENIC CLAUDICATION: Primary | ICD-10-CM

## 2025-07-16 DIAGNOSIS — G95.9 CERVICAL MYELOPATHY: ICD-10-CM

## 2025-07-16 PROCEDURE — 99203 OFFICE O/P NEW LOW 30 MIN: CPT | Performed by: NEUROLOGICAL SURGERY

## 2025-07-16 PROCEDURE — 1159F MED LIST DOCD IN RCRD: CPT | Performed by: NEUROLOGICAL SURGERY

## 2025-07-16 PROCEDURE — 1160F RVW MEDS BY RX/DR IN RCRD: CPT | Performed by: NEUROLOGICAL SURGERY

## 2025-07-16 NOTE — PROGRESS NOTES
Patient: Donta Ledesma Jr.  : 1955    Primary Care Provider: Josh Ortiz MD    Requesting Provider: As above          History of Present Illness  The patient is a 69-year-old male who presents for evaluation of back pain.    Mr. Ledesma has been experiencing increased back pain for the past 6 weeks, which has progressively worsened to the point where he can no longer play golf or walk his usual 3 to 4 miles daily.  I saw him in 2021 with chronic intermittent low back pain.  While he had spinal stenosis he did not describe neurogenic claudication.  He was hoping to do something to diminish symptoms so he could continue skiing and golfing with less difficulty.  Symptomatic treatment was recommended.  The pain is now localized in the lower back and radiates down both legs to the calves. He also reports weakness and balance issues, leading to a fear of falling. However, he does not experience any discomfort while sitting or lying down. His symptoms are exacerbated when he is on his feet or bearing weight. He reports no recent falls or accidents. He maintains an active lifestyle, including walking and playing golf, but these activities have been limited due to his current condition. Physical therapy provided some relief for his left leg but was ineffective for his right leg. He has a history of intermittent back stiffness, which typically improves with rest.  He has had urinary retention and now has a catheter in place.  Apparently Dr. Matta plans on pursuing a TURP on .  The patient has no perineal sensory change.  He has some constipation but no bowel incontinence.  He denies neck, mid back, abdominal, or arm symptoms.  Once again, his bladder difficulties and lower extremity difficulties have rather abruptly occurred beginning 6 weeks ago.      Review of Systems   Constitutional:  Negative for activity change, appetite change, chills, diaphoresis, fatigue, fever and unexpected weight  change.   HENT:  Negative for congestion, dental problem, drooling, ear discharge, ear pain, facial swelling, hearing loss, mouth sores, nosebleeds, postnasal drip, rhinorrhea, sinus pressure, sinus pain, sneezing, sore throat, tinnitus, trouble swallowing and voice change.    Eyes:  Negative for photophobia, pain, discharge, redness, itching and visual disturbance.   Respiratory:  Negative for apnea, cough, choking, chest tightness, shortness of breath, wheezing and stridor.    Cardiovascular:  Negative for chest pain, palpitations and leg swelling.   Gastrointestinal:  Negative for abdominal distention, abdominal pain, anal bleeding, blood in stool, constipation, diarrhea, nausea, rectal pain and vomiting.   Endocrine: Negative for cold intolerance, heat intolerance, polydipsia, polyphagia and polyuria.   Genitourinary:  Positive for decreased urine volume. Negative for difficulty urinating, dysuria, enuresis, flank pain, frequency, genital sores, hematuria and urgency.   Musculoskeletal:  Positive for back pain and gait problem. Negative for arthralgias, joint swelling, myalgias, neck pain and neck stiffness.   Skin:  Negative for color change, pallor, rash and wound.   Allergic/Immunologic: Negative for environmental allergies, food allergies and immunocompromised state.   Neurological:  Positive for weakness. Negative for dizziness, tremors, seizures, syncope, facial asymmetry, speech difficulty, light-headedness, numbness and headaches.   Hematological:  Negative for adenopathy. Does not bruise/bleed easily.   Psychiatric/Behavioral:  Negative for agitation, behavioral problems, confusion, decreased concentration, dysphoric mood, hallucinations, self-injury, sleep disturbance and suicidal ideas. The patient is not nervous/anxious and is not hyperactive.        The patient's past medical history, past surgical history, family history, and social history have been reviewed at length in the electronic medical  record.      Physical Exam:   CONSTITUTIONAL: Patient is well-nourished, pleasant and appears stated age.  MUSCULOSKELETAL:  Straight leg raising is negative.  Steve's Sign is negative.  ROM in the low back is normal.  Tenderness in the back to palpation is not observed.  NEUROLOGICAL:  Orientation, memory, attention span, language function, and cognition have been examined and are intact.  Strength is diminished in plantar and dorsiflexion of both feet.  Muscle tone is normal throughout.  Station and gait are somewhat lurching and unsteady.  This is an abrupt change from his usual gait according to his wife.  He is unable to stand on his heels or toes.  Sensation is intact to light touch testing throughout.  Deep tendon reflexes are difficult to elicit throughout.  Jennifer signs are negative.  Coordination is intact.      Medical Decision Making    Data Review:   (All imaging studies were personally reviewed unless stated otherwise)  Results  MRI spine dated 6/30/2025 demonstrates diffuse degenerative disc disease and facet arthropathy.  There is generous stenosis at L C3 and L3-4.  Broad-based disc protrusion more prominent to the right narrows the recess on the right at L4-5.  Biforaminal narrowing is noted.  He does have transitional anatomy with S1 being somewhat lumbarized.      Diagnosis:   The abrupt onset of bladder difficulties and lower extremity dysfunction with increased back pain would be fairly atypical for spinal stenosis which typically has a more insidious onset and progression of symptoms.  It is possible that he has more than one issue ongoing but I am concerned that he has another neurologic process that is occurring.  His complaints and exam do not suggest a lesion higher up in his spinal axis that could be a possibility.  The patient could harbor a severe peripheral neuropathy with rapid progression.    Treatment Options:   I have referred the patient for MRI studies of the cervical and  thoracic spine.  I am also going to check electrodiagnostic studies of his lower extremities.  He will follow-up next week.    Patient or patient representative verbalized consent for the use of Ambient Listening during the visit with  Дмитрий Thomas MD for chart documentation. 7/16/2025  10:45 EDT    Scribed for Дмитрий Thomas MD by Jessica Miranda CMA on 7/16/2025 10:45 EDT       I, Dr. Thomas, personally performed the services described in the documentation, as scribed in my presence, and it is both accurate and complete.

## 2025-07-19 ENCOUNTER — HOSPITAL ENCOUNTER (EMERGENCY)
Facility: HOSPITAL | Age: 70
Discharge: HOME | End: 2025-07-19
Attending: EMERGENCY MEDICINE
Payer: MEDICARE

## 2025-07-19 ENCOUNTER — APPOINTMENT (OUTPATIENT)
Dept: CT IMAGING | Facility: HOSPITAL | Age: 70
End: 2025-07-19
Payer: MEDICARE

## 2025-07-19 VITALS
RESPIRATION RATE: 16 BRPM | HEART RATE: 105 BPM | TEMPERATURE: 98.1 F | HEIGHT: 74 IN | OXYGEN SATURATION: 98 % | SYSTOLIC BLOOD PRESSURE: 112 MMHG | BODY MASS INDEX: 27.59 KG/M2 | WEIGHT: 215 LBS | DIASTOLIC BLOOD PRESSURE: 74 MMHG

## 2025-07-19 DIAGNOSIS — N30.01 ACUTE CYSTITIS WITH HEMATURIA: Primary | ICD-10-CM

## 2025-07-19 DIAGNOSIS — R91.1 PULMONARY NODULE: ICD-10-CM

## 2025-07-19 DIAGNOSIS — N18.9 CHRONIC KIDNEY DISEASE, UNSPECIFIED CKD STAGE: ICD-10-CM

## 2025-07-19 DIAGNOSIS — T83.9XXA COMPLICATION OF FOLEY CATHETER, INITIAL ENCOUNTER: ICD-10-CM

## 2025-07-19 DIAGNOSIS — N39.0 ACUTE UTI: ICD-10-CM

## 2025-07-19 DIAGNOSIS — R33.9 URINARY RETENTION: ICD-10-CM

## 2025-07-19 LAB
ALBUMIN SERPL-MCNC: 4.1 G/DL (ref 3.5–5.2)
ALBUMIN/GLOB SERPL: 1.3 G/DL
ALP SERPL-CCNC: 114 U/L (ref 39–117)
ALT SERPL W P-5'-P-CCNC: 20 U/L (ref 1–41)
ANION GAP SERPL CALCULATED.3IONS-SCNC: 15.2 MMOL/L (ref 5–15)
AST SERPL-CCNC: 25 U/L (ref 1–40)
BACTERIA UR QL AUTO: ABNORMAL /HPF
BASOPHILS # BLD AUTO: 0.04 10*3/MM3 (ref 0–0.2)
BASOPHILS NFR BLD AUTO: 0.4 % (ref 0–1.5)
BILIRUB SERPL-MCNC: 0.6 MG/DL (ref 0–1.2)
BILIRUB UR QL STRIP: NEGATIVE
BUN SERPL-MCNC: 23.7 MG/DL (ref 8–23)
BUN/CREAT SERPL: 10 (ref 7–25)
CALCIUM SPEC-SCNC: 9.4 MG/DL (ref 8.6–10.5)
CHLORIDE SERPL-SCNC: 106 MMOL/L (ref 98–107)
CLARITY UR: ABNORMAL
CO2 SERPL-SCNC: 19.8 MMOL/L (ref 22–29)
COLOR UR: ABNORMAL
CREAT SERPL-MCNC: 2.38 MG/DL (ref 0.76–1.27)
DEPRECATED RDW RBC AUTO: 44.3 FL (ref 37–54)
EGFRCR SERPLBLD CKD-EPI 2021: 28.6 ML/MIN/1.73
EOSINOPHIL # BLD AUTO: 0.08 10*3/MM3 (ref 0–0.4)
EOSINOPHIL NFR BLD AUTO: 0.9 % (ref 0.3–6.2)
ERYTHROCYTE [DISTWIDTH] IN BLOOD BY AUTOMATED COUNT: 13.5 % (ref 12.3–15.4)
GLOBULIN UR ELPH-MCNC: 3.1 GM/DL
GLUCOSE SERPL-MCNC: 95 MG/DL (ref 65–99)
GLUCOSE UR STRIP-MCNC: NEGATIVE MG/DL
HCT VFR BLD AUTO: 39.5 % (ref 37.5–51)
HGB BLD-MCNC: 13.6 G/DL (ref 13–17.7)
HGB UR QL STRIP.AUTO: ABNORMAL
HYALINE CASTS UR QL AUTO: ABNORMAL /LPF
IMM GRANULOCYTES # BLD AUTO: 0.03 10*3/MM3 (ref 0–0.05)
IMM GRANULOCYTES NFR BLD AUTO: 0.3 % (ref 0–0.5)
KETONES UR QL STRIP: NEGATIVE
LEUKOCYTE ESTERASE UR QL STRIP.AUTO: ABNORMAL
LYMPHOCYTES # BLD AUTO: 0.88 10*3/MM3 (ref 0.7–3.1)
LYMPHOCYTES NFR BLD AUTO: 9.6 % (ref 19.6–45.3)
MCH RBC QN AUTO: 30.8 PG (ref 26.6–33)
MCHC RBC AUTO-ENTMCNC: 34.4 G/DL (ref 31.5–35.7)
MCV RBC AUTO: 89.6 FL (ref 79–97)
MONOCYTES # BLD AUTO: 0.7 10*3/MM3 (ref 0.1–0.9)
MONOCYTES NFR BLD AUTO: 7.6 % (ref 5–12)
NEUTROPHILS NFR BLD AUTO: 7.44 10*3/MM3 (ref 1.7–7)
NEUTROPHILS NFR BLD AUTO: 81.2 % (ref 42.7–76)
NITRITE UR QL STRIP: POSITIVE
NRBC BLD AUTO-RTO: 0 /100 WBC (ref 0–0.2)
PH UR STRIP.AUTO: 8.5 [PH] (ref 5–8)
PLATELET # BLD AUTO: 230 10*3/MM3 (ref 140–450)
PMV BLD AUTO: 8.7 FL (ref 6–12)
POTASSIUM SERPL-SCNC: 3.9 MMOL/L (ref 3.5–5.2)
PROT SERPL-MCNC: 7.2 G/DL (ref 6–8.5)
PROT UR QL STRIP: ABNORMAL
RBC # BLD AUTO: 4.41 10*6/MM3 (ref 4.14–5.8)
RBC # UR STRIP: ABNORMAL /HPF
REF LAB TEST METHOD: ABNORMAL
SODIUM SERPL-SCNC: 141 MMOL/L (ref 136–145)
SP GR UR STRIP: 1.02 (ref 1–1.03)
SQUAMOUS #/AREA URNS HPF: ABNORMAL /HPF
TRI-PHOS CRY URNS QL MICRO: ABNORMAL /HPF
UROBILINOGEN UR QL STRIP: ABNORMAL
WBC # UR STRIP: ABNORMAL /HPF
WBC NRBC COR # BLD AUTO: 9.17 10*3/MM3 (ref 3.4–10.8)

## 2025-07-19 PROCEDURE — 81001 URINALYSIS AUTO W/SCOPE: CPT | Performed by: NURSE PRACTITIONER

## 2025-07-19 PROCEDURE — 87086 URINE CULTURE/COLONY COUNT: CPT | Performed by: NURSE PRACTITIONER

## 2025-07-19 PROCEDURE — 87077 CULTURE AEROBIC IDENTIFY: CPT | Performed by: NURSE PRACTITIONER

## 2025-07-19 PROCEDURE — 87186 SC STD MICRODIL/AGAR DIL: CPT | Performed by: NURSE PRACTITIONER

## 2025-07-19 PROCEDURE — 85025 COMPLETE CBC W/AUTO DIFF WBC: CPT | Performed by: NURSE PRACTITIONER

## 2025-07-19 PROCEDURE — 99284 EMERGENCY DEPT VISIT MOD MDM: CPT

## 2025-07-19 PROCEDURE — 74176 CT ABD & PELVIS W/O CONTRAST: CPT

## 2025-07-19 PROCEDURE — 36415 COLL VENOUS BLD VENIPUNCTURE: CPT

## 2025-07-19 PROCEDURE — 80053 COMPREHEN METABOLIC PANEL: CPT | Performed by: NURSE PRACTITIONER

## 2025-07-19 RX ORDER — CEFUROXIME AXETIL 250 MG/1
500 TABLET ORAL ONCE
Status: COMPLETED | OUTPATIENT
Start: 2025-07-19 | End: 2025-07-19

## 2025-07-19 RX ORDER — CEFUROXIME AXETIL 500 MG/1
500 TABLET ORAL 2 TIMES DAILY
Qty: 20 TABLET | Refills: 0 | Status: SHIPPED | OUTPATIENT
Start: 2025-07-19

## 2025-07-19 RX ADMIN — CEFUROXIME AXETIL 500 MG: 250 TABLET ORAL at 13:03

## 2025-07-19 NOTE — ED PROVIDER NOTES
Subjective   History of Present Illness  Pleasant patient who presents to the ER for lower abdominal discomfort and he feels like his Thornton catheter is clogged up specifically where the tube comes into the bag at the more distal end of his Thornton catheter.  He follows Dr. De La O he supposed be getting a TURP soon.  He denies any flank pain.  He reports only having 1 kidney his previous kidney was removed COVID to assist.  He denies any fevers chills chest pain or difficulty breathing.        Review of Systems    Past Medical History:   Diagnosis Date    Allergic     Biaxin    Back problem     Benign prostate hyperplasia     Glaucoma     Hypertension     Kidney disease     Low back pain     Nephrolithiasis     CRISTINE (obstructive sleep apnea)     Renal insufficiency        Allergies   Allergen Reactions    Erythromycin Unknown - Low Severity     erythromycin    Nsaids Other (See Comments)     Kidney disease and removal of kidney    Clarithromycin Other (See Comments) and Unknown - Low Severity     Unknown;  BIAXIN    Biaxin       Past Surgical History:   Procedure Laterality Date    COLONOSCOPY      Multiple times    EYE SURGERY      Lasix    KIDNEY SURGERY      Kidney removal    WISDOM TOOTH EXTRACTION         Family History   Problem Relation Age of Onset    Hypertension Mother     Cancer Mother     Diabetes Father     Cancer Father     Hypertension Brother     Cancer Maternal Grandfather     Coronary artery disease Paternal Grandmother     Stroke Paternal Grandfather        Social History     Socioeconomic History    Marital status:    Tobacco Use    Smoking status: Never     Passive exposure: Never    Smokeless tobacco: Never   Vaping Use    Vaping status: Never Used   Substance and Sexual Activity    Alcohol use: Never    Drug use: Never    Sexual activity: Not Currently     Partners: Female           Objective   Physical Exam  Constitutional:       Appearance: He is well-developed.   HENT:      Head:  Normocephalic and atraumatic.      Right Ear: External ear normal.      Left Ear: External ear normal.      Nose: Nose normal.   Eyes:      Conjunctiva/sclera: Conjunctivae normal.      Pupils: Pupils are equal, round, and reactive to light.   Cardiovascular:      Rate and Rhythm: Normal rate and regular rhythm.      Heart sounds: Normal heart sounds.   Pulmonary:      Effort: Pulmonary effort is normal.      Breath sounds: Normal breath sounds.   Abdominal:      General: Bowel sounds are normal.      Palpations: Abdomen is soft.      Tenderness: There is abdominal tenderness (He is some tenderness over his lower abdomen with a distended most likely bladder.  Slightly to moderate tender nests.).   Genitourinary:     Comments: Cloudy appearing urine that appears to be stuck in the tube and not draining in his leg bag.  Musculoskeletal:         General: Normal range of motion.      Cervical back: Normal range of motion and neck supple.   Skin:     General: Skin is warm and dry.   Neurological:      Mental Status: He is alert and oriented to person, place, and time.   Psychiatric:         Behavior: Behavior normal.         Judgment: Judgment normal.         Procedures           ED Course  ED Course as of 07/19/25 1705   Sat Jul 19, 2025   1225 Patient's urine went from very cloudy and stuck in the tube which was resolved after switching out the Thornton catheter tube to something new and is able to drain his bladder.  His urine was a dark nael color now there is a little bit of blood in the tube.  We did not replace the Thornton catheter just the tube.  This could be related to cystitis.  Labs overall reassuring and stable.  Think is reasonable to get a CAT scan for further evaluation for possible urinary retention large clot etc. [JM]   1337 CAT scan overall reassuring.  No evidence of urinary retention.  Bladder draining.  Some tiny blood clots noted but there is no large amount or gross amount of blood in the urine nael  color. [JM]      ED Course User Index  [JM] Marvin Montero APRN                                           CT Abdomen Pelvis Without Contrast   Final Result   Impression:   1.Urinary bladder is decompressed with indwelling Thornton catheter. Bladder wall thickening may be related to underdistention or cystitis. Please correlate with urinalysis.   2.Prostatomegaly.   3.Hepatic steatosis.   4.Colonic diverticulosis.   5.3 mm subpleural nodule within the right lower lobe.   6.Additional findings as detailed above.            Electronically Signed: Damien Burroughs MD     7/19/2025 12:56 PM EDT     Workstation ID: [id number]                    Medical Decision Making  Problems Addressed:  Acute cystitis with hematuria: complicated acute illness or injury  Acute UTI: complicated acute illness or injury  Chronic kidney disease, unspecified CKD stage: complicated acute illness or injury  Complication of Thornton catheter, initial encounter: complicated acute illness or injury  Pulmonary nodule: complicated acute illness or injury  Urinary retention: complicated acute illness or injury    Amount and/or Complexity of Data Reviewed  Labs: ordered.  Radiology: ordered.    Risk  Prescription drug management.        Final diagnoses:   Acute UTI   Acute cystitis with hematuria   Pulmonary nodule   Urinary retention   Complication of Thornton catheter, initial encounter   Chronic kidney disease, unspecified CKD stage       ED Disposition  ED Disposition       ED Disposition   Discharge    Condition   Stable    Comment   --               Josh Ortiz MD  [address] [id number] [phone]    Schedule an appointment as soon as possible for a visit       Jamil Matta MD  [address] [address]  [phone]    Schedule an appointment as soon as possible for a visit       [facility] EMERGENCY DEPARTMENT  [address] [facility] [address] [phone]    If  symptoms worsen         Medication List        New Prescriptions      cefuroxime 500 MG tablet  Commonly known as: CEFTIN  Take 1 tablet by mouth 2 (Two) Times a Day.               Where to Get Your Medications        These medications [facility] to [id number] - [place], [place] - [address] AT [facility] [address] [facility] - [phone]  - [phone]   [address], [facility] [address]      Phone: [phone]   cefuroxime 500 MG tablet            Marvin Montero APRN  07/19/25 1341       Marvin Montero APRN  07/19/25 1700

## 2025-07-20 ENCOUNTER — HOSPITAL ENCOUNTER (OUTPATIENT)
Facility: HOSPITAL | Age: 70
Discharge: HOME | End: 2025-07-20
Payer: MEDICARE

## 2025-07-20 DIAGNOSIS — G95.9 CERVICAL MYELOPATHY: ICD-10-CM

## 2025-07-20 DIAGNOSIS — M47.14 THORACIC MYELOPATHY: ICD-10-CM

## 2025-07-20 PROCEDURE — 72146 MRI CHEST SPINE W/O DYE: CPT

## 2025-07-20 PROCEDURE — 72141 MRI NECK SPINE W/O DYE: CPT

## 2025-07-21 LAB — BACTERIA SPEC AEROBE CULT: ABNORMAL

## 2025-07-22 ENCOUNTER — RESULTS FOLLOW-UP (OUTPATIENT)
Dept: GASTROENTEROLOGY | Facility: CLINIC | Age: 70
End: 2025-07-22
Payer: MEDICARE

## 2025-07-22 NOTE — LETTER
Donta MEJIAS Calile   [address]  [place] [address]    July 22, 2025     Dear Mr. Ledesma:    Below are the results from your recent visit:    Resulted Orders   Celiac Disease Panel   Result Value Ref Range    Endomysial IgA Negative Negative    Tissue Transglutaminase IgA <2 0 - 3 U/mL      Comment:                                    Negative        0 -  3                                Weak Positive   4 - 10                                Positive           >10   Tissue Transglutaminase (tTG) has been identified   as the endomysial antigen.  Studies have demonstr-   ated that endomysial IgA antibodies have over 99%   specificity for gluten sensitive enteropathy.    IgA 261 61 - 437 mg/dL   TSH   Result Value Ref Range    TSH 2.980 0.270 - 4.200 uIU/mL       Your TSH (thyroid study) is normal. Celiac panel is negative.   Celiac Disease Panel; TSH    If you have any questions or concerns, please don't hesitate to call.         Sincerely,        Lupis Sinha, APRN

## 2025-07-23 ENCOUNTER — OFFICE VISIT (OUTPATIENT)
Dept: NEUROSURGERY | Facility: CLINIC | Age: 70
End: 2025-07-23
Payer: MEDICARE

## 2025-07-23 ENCOUNTER — PREP FOR SURGERY (OUTPATIENT)
Dept: OTHER | Facility: HOSPITAL | Age: 70
End: 2025-07-23
Payer: MEDICARE

## 2025-07-23 VITALS — BODY MASS INDEX: 27.59 KG/M2 | WEIGHT: 215 LBS | HEIGHT: 74 IN | TEMPERATURE: 98.2 F

## 2025-07-23 DIAGNOSIS — M48.062 LUMBAR STENOSIS WITH NEUROGENIC CLAUDICATION: Primary | ICD-10-CM

## 2025-07-23 DIAGNOSIS — G60.9 HEREDITARY AND IDIOPATHIC PERIPHERAL NEUROPATHY: ICD-10-CM

## 2025-07-23 DIAGNOSIS — M48.02 SPINAL STENOSIS IN CERVICAL REGION: ICD-10-CM

## 2025-07-23 DIAGNOSIS — M48.062 SPINAL STENOSIS OF LUMBAR REGION WITH NEUROGENIC CLAUDICATION: Primary | ICD-10-CM

## 2025-07-23 DIAGNOSIS — M51.9 LUMBAR DISC DISEASE: ICD-10-CM

## 2025-07-23 DIAGNOSIS — M54.16 LUMBAR RADICULOPATHY: ICD-10-CM

## 2025-07-23 PROCEDURE — 99214 OFFICE O/P EST MOD 30 MIN: CPT | Performed by: NEUROLOGICAL SURGERY

## 2025-07-23 PROCEDURE — 1159F MED LIST DOCD IN RCRD: CPT | Performed by: NEUROLOGICAL SURGERY

## 2025-07-23 PROCEDURE — 1160F RVW MEDS BY RX/DR IN RCRD: CPT | Performed by: NEUROLOGICAL SURGERY

## 2025-07-23 RX ORDER — FAMOTIDINE 20 MG/1
20 TABLET, FILM COATED ORAL
OUTPATIENT
Start: 2025-07-23

## 2025-07-23 RX ORDER — ACETAMINOPHEN 500 MG
1000 TABLET ORAL ONCE
OUTPATIENT
Start: 2025-07-23 | End: 2025-07-23

## 2025-07-23 RX ORDER — OXYCODONE HCL 10 MG/1
10 TABLET, FILM COATED, EXTENDED RELEASE ORAL ONCE
Refills: 0 | OUTPATIENT
Start: 2025-07-23 | End: 2025-07-23

## 2025-07-23 RX ORDER — ONDANSETRON 4 MG/1
4 TABLET, FILM COATED ORAL EVERY 6 HOURS PRN
COMMUNITY

## 2025-07-23 RX ORDER — IBUPROFEN 800 MG/1
800 TABLET, FILM COATED ORAL ONCE
OUTPATIENT
Start: 2025-07-23 | End: 2025-07-23

## 2025-07-23 RX ORDER — HYDROCHLOROTHIAZIDE 12.5 MG/1
12.5 TABLET ORAL DAILY
COMMUNITY

## 2025-07-23 RX ORDER — CHLORHEXIDINE GLUCONATE 40 MG/ML
SOLUTION TOPICAL
Qty: 120 ML | Refills: 0 | Status: SHIPPED | OUTPATIENT
Start: 2025-07-23

## 2025-07-23 RX ORDER — TAMSULOSIN HYDROCHLORIDE 0.4 MG/1
1 CAPSULE ORAL DAILY
COMMUNITY

## 2025-07-23 RX ORDER — POTASSIUM CHLORIDE 750 MG/1
10 TABLET, EXTENDED RELEASE ORAL 2 TIMES DAILY
COMMUNITY

## 2025-07-23 RX ORDER — SERTRALINE HYDROCHLORIDE 25 MG/1
25 TABLET, FILM COATED ORAL DAILY
COMMUNITY

## 2025-07-23 NOTE — PROGRESS NOTES
Patient: Donta Ledesma Jr.  : 1955    Primary Care Provider: Josh Ortiz MD    Requesting Provider: As above        History    Chief Complaint: Low back pain with walking and standing intolerance.    History of Present Illness: Mr. Ledesma is a 69-year-old gentleman who has been experiencing increased back pain for the past 6 weeks, which has progressively worsened to the point where he can no longer play golf or walk his usual 3 to 4 miles daily.  I saw him in 2021 with chronic intermittent low back pain.  While he had spinal stenosis he did not describe neurogenic claudication.  He was hoping to do something to diminish symptoms so he could continue skiing and golfing with less difficulty.  Symptomatic treatment was recommended.  The pain is now localized in the lower back and radiates down both legs to the calves. He also reports weakness and balance issues, leading to a fear of falling. However, he does not experience any discomfort while sitting or lying down. His symptoms are exacerbated when he is on his feet or bearing weight. He reports no recent falls or accidents. He maintains an active lifestyle, including walking and playing golf, but these activities have been limited due to his current condition. Physical therapy provided some relief for his left leg but was ineffective for his right leg. He has a history of intermittent back stiffness, which typically improves with rest.  He has had urinary retention and now has a catheter in place.  Apparently Dr. Matta plans on pursuing a TURP on .  The patient has no perineal sensory change.  He has some constipation but no bowel incontinence.  He denies neck, mid back, abdominal, or arm symptoms.  Once again, his bladder difficulties and lower extremity difficulties have rather abruptly occurred beginning 6 weeks ago.  Once again, he has no arm, chest, abdominal symptoms.  Otherwise his difficulties are unchanged.    Review of  Systems   Constitutional:  Negative for activity change, appetite change, chills, diaphoresis, fatigue, fever and unexpected weight change.   HENT:  Negative for congestion, dental problem, drooling, ear discharge, ear pain, facial swelling, hearing loss, mouth sores, nosebleeds, postnasal drip, rhinorrhea, sinus pressure, sneezing, sore throat, tinnitus, trouble swallowing and voice change.    Eyes:  Negative for photophobia, pain, discharge, redness, itching and visual disturbance.   Respiratory:  Negative for apnea, cough, choking, chest tightness, shortness of breath, wheezing and stridor.    Cardiovascular:  Negative for chest pain, palpitations and leg swelling.   Gastrointestinal:  Negative for abdominal distention, abdominal pain, anal bleeding, blood in stool, constipation, diarrhea, nausea, rectal pain and vomiting.   Endocrine: Negative for cold intolerance, heat intolerance, polydipsia, polyphagia and polyuria.   Genitourinary:  Negative for decreased urine volume, difficulty urinating, dysuria, enuresis, flank pain, frequency, genital sores, hematuria and urgency.   Musculoskeletal:  Positive for arthralgias. Negative for back pain, gait problem, joint swelling, myalgias, neck pain and neck stiffness.   Skin:  Negative for color change, pallor, rash and wound.   Allergic/Immunologic: Negative for environmental allergies, food allergies and immunocompromised state.   Neurological:  Negative for dizziness, tremors, seizures, syncope, facial asymmetry, speech difficulty, weakness, light-headedness, numbness and headaches.   Hematological:  Negative for adenopathy. Does not bruise/bleed easily.   Psychiatric/Behavioral:  Negative for agitation, behavioral problems, confusion, decreased concentration, dysphoric mood, hallucinations, self-injury, sleep disturbance and suicidal ideas. The patient is not nervous/anxious and is not hyperactive.    All other systems reviewed and are negative.      The patient's  "past medical history, past surgical history, family history, and social history have been reviewed at length in the electronic medical record.      Physical Exam:   Temp 98.2 °F (36.8 °C) (Temporal)   Ht 188 cm (74\")   Wt 97.5 kg (215 lb)   BMI 27.60 kg/m²   MUSCULOSKELETAL:  Straight leg raising is negative.  Steve's Sign is negative.  ROM in the low back is normal.  Tenderness in the back to palpation is not observed.  NEUROLOGICAL:  Orientation, memory, attention span, language function, and cognition have been examined and are intact.  Strength is diminished in plantar and dorsiflexion of both feet.  Muscle tone is normal throughout.  Station and gait are somewhat lurching and unsteady.  This is an abrupt change from his usual gait according to his wife.  He is unable to stand on his heels or toes.  Sensation is intact to light touch testing throughout.  Deep tendon reflexes are difficult to elicit throughout.  Jennifer signs are negative.  Coordination is intact.    Medical Decision Making    Data Review:   (All imaging studies were personally reviewed unless stated otherwise)  MRI spine dated 6/30/2025 demonstrates diffuse degenerative disc disease and facet arthropathy. There is generous stenosis at L2-3 and L3-4. Broad-based disc protrusion more prominent to the right narrows the recess on the right at L4-5. Biforaminal narrowing is noted. He does have transitional anatomy with S1 being somewhat lumbarized.  Radiologist interpreted this differently, not acknowledging the transitional anatomy.    MRI of the cervical spine demonstrates generous stenosis at C3-4 and C4-5 without adalberto signal change within the spinal cord.    MRI of the thoracic spine demonstrates some degenerative change but no cord compromise.    Electrodiagnostic studies performed by Dr. Lara suggest bilateral acute and chronic L5 radiculopathies in the setting of an underlying moderate peripheral neuropathy.    Diagnosis:   1.  Lumbar " stenosis with neurogenic claudication.  2.  Lumbar radiculopathy.  3.  Peripheral neuropathy.  4.  Cervical stenosis without overt myelopathy.    Treatment Options:   I have recommended decompressive laminectomies from L2-5.  The goal of surgery would be to improve his claudication symptoms.  He would likely require aggressive physical therapy afterward given the weakness in his feet.  The nature of the procedure as well as the potential risks, complications, limitations, and alternatives to the procedure were discussed at length with the patient and the patient has agreed to proceed with surgery.  I would plan on doing this a couple of weeks after his TURP if if cleared by his urologist.  The patient's nephew is an orthopedic spine surgeon in [place].  He may call me to discuss his uncle's care.      Scribed for Дмитрий Thomas MD by Jessica Miranda CMA on 7/23/2025 12:00 EDT       I, Dr. Thomas, personally performed the services described in the documentation, as scribed in my presence, and it is both accurate and complete.

## 2025-07-23 NOTE — H&P
Patient: Donta Ledesma Jr.  : 1955     Primary Care Provider: Josh Ortiz MD     Requesting Provider: As above           History     Chief Complaint: Low back pain with walking and standing intolerance.     History of Present Illness: Mr. Ledesma is a 69-year-old gentleman who has been experiencing increased back pain for the past 6 weeks, which has progressively worsened to the point where he can no longer play golf or walk his usual 3 to 4 miles daily.  I saw him in 2021 with chronic intermittent low back pain.  While he had spinal stenosis he did not describe neurogenic claudication.  He was hoping to do something to diminish symptoms so he could continue skiing and golfing with less difficulty.  Symptomatic treatment was recommended.  The pain is now localized in the lower back and radiates down both legs to the calves. He also reports weakness and balance issues, leading to a fear of falling. However, he does not experience any discomfort while sitting or lying down. His symptoms are exacerbated when he is on his feet or bearing weight. He reports no recent falls or accidents. He maintains an active lifestyle, including walking and playing golf, but these activities have been limited due to his current condition. Physical therapy provided some relief for his left leg but was ineffective for his right leg. He has a history of intermittent back stiffness, which typically improves with rest.  He has had urinary retention and now has a catheter in place.  Apparently Dr. Matta plans on pursuing a TURP on .  The patient has no perineal sensory change.  He has some constipation but no bowel incontinence.  He denies neck, mid back, abdominal, or arm symptoms.  Once again, his bladder difficulties and lower extremity difficulties have rather abruptly occurred beginning 6 weeks ago.  Once again, he has no arm, chest, abdominal symptoms.  Otherwise his difficulties are unchanged.      Review of Systems   Constitutional:  Negative for activity change, appetite change, chills, diaphoresis, fatigue, fever and unexpected weight change.   HENT:  Negative for congestion, dental problem, drooling, ear discharge, ear pain, facial swelling, hearing loss, mouth sores, nosebleeds, postnasal drip, rhinorrhea, sinus pressure, sneezing, sore throat, tinnitus, trouble swallowing and voice change.    Eyes:  Negative for photophobia, pain, discharge, redness, itching and visual disturbance.   Respiratory:  Negative for apnea, cough, choking, chest tightness, shortness of breath, wheezing and stridor.    Cardiovascular:  Negative for chest pain, palpitations and leg swelling.   Gastrointestinal:  Negative for abdominal distention, abdominal pain, anal bleeding, blood in stool, constipation, diarrhea, nausea, rectal pain and vomiting.   Endocrine: Negative for cold intolerance, heat intolerance, polydipsia, polyphagia and polyuria.   Genitourinary:  Negative for decreased urine volume, difficulty urinating, dysuria, enuresis, flank pain, frequency, genital sores, hematuria and urgency.   Musculoskeletal:  Positive for arthralgias. Negative for back pain, gait problem, joint swelling, myalgias, neck pain and neck stiffness.   Skin:  Negative for color change, pallor, rash and wound.   Allergic/Immunologic: Negative for environmental allergies, food allergies and immunocompromised state.   Neurological:  Negative for dizziness, tremors, seizures, syncope, facial asymmetry, speech difficulty, weakness, light-headedness, numbness and headaches.   Hematological:  Negative for adenopathy. Does not bruise/bleed easily.   Psychiatric/Behavioral:  Negative for agitation, behavioral problems, confusion, decreased concentration, dysphoric mood, hallucinations, self-injury, sleep disturbance and suicidal ideas. The patient is not nervous/anxious and is not hyperactive.    All other systems reviewed and are negative.         The patient's past medical history, past surgical history, family history, and social history have been reviewed at length in the electronic medical record.     Past Medical History:   Diagnosis Date    Allergic     Biaxin    Back problem     Benign prostate hyperplasia     Glaucoma     Hypertension     Taking medication    Kidney disease     Low back pain     Nephrolithiasis     CRISTINE (obstructive sleep apnea)     Renal insufficiency      Past Surgical History:   Procedure Laterality Date    COLONOSCOPY      Multiple times    EYE SURGERY      Lasix    KIDNEY SURGERY      Kidney removal    WISDOM TOOTH EXTRACTION       Family History   Problem Relation Age of Onset    Hypertension Mother     Cancer Mother     Diabetes Father     Cancer Father     Hypertension Brother     Cancer Maternal Grandfather     Coronary artery disease Paternal Grandmother     Stroke Paternal Grandfather      Social History     Socioeconomic History    Marital status:    Tobacco Use    Smoking status: Never     Passive exposure: Never    Smokeless tobacco: Never   Vaping Use    Vaping status: Never Used   Substance and Sexual Activity    Alcohol use: Never    Drug use: Never    Sexual activity: Not Currently     Partners: Female           Allergies   Allergen Reactions    Erythromycin Unknown - Low Severity     erythromycin    Nsaids Other (See Comments)     Kidney disease and removal of kidney    Clarithromycin Other (See Comments) and Unknown - Low Severity     Unknown;  BIAXIN    Biaxin       Current Outpatient Medications on File Prior to Visit   Medication Sig Dispense Refill    amLODIPine (NORVASC) 10 MG tablet Take 1 tablet by mouth Daily. 90 tablet 3    atorvastatin (LIPITOR) 20 MG tablet TAKE 1 TABLET BY MOUTH DAILY 90 tablet 3    buPROPion XL (WELLBUTRIN XL) 300 MG 24 hr tablet TAKE 1 TABLET BY MOUTH DAILY 90 tablet 3    cefuroxime (CEFTIN) 500 MG tablet Take 1 tablet by mouth 2 (Two) Times a Day. 20 tablet 0     "Cholecalciferol (Vitamin D3) 50 MCG (2000 UT) tablet Take 1 tablet by mouth 4 (Four) Times a Week.      doxazosin (CARDURA) 4 MG tablet Take 1 tablet by mouth Daily. 90 tablet 3    hydroCHLOROthiazide 12.5 MG tablet Take 1 tablet by mouth Daily.      ondansetron (ZOFRAN) 4 MG tablet Take 1 tablet by mouth Every 6 (Six) Hours As Needed for Nausea or Vomiting.      ondansetron ODT (ZOFRAN-ODT) 4 MG disintegrating tablet Place 1 tablet on the tongue Every 8 (Eight) Hours As Needed for Nausea or Vomiting. 30 tablet 2    potassium chloride 10 MEQ CR tablet Take 1 tablet by mouth 2 (Two) Times a Day.      promethazine (PHENERGAN) 25 MG tablet Take 1 tablet by mouth Every 6 (Six) Hours As Needed for Nausea or Vomiting. 20 tablet 0    sertraline (ZOLOFT) 25 MG tablet Take 1 tablet by mouth Daily.      sertraline (ZOLOFT) 50 MG tablet Take 1 tablet by mouth Daily. 90 tablet 3    Sod Picosulfate-Mag Ox-Cit Acd 10-3.5-12 MG-GM -GM/160ML solution Take 350 mL by mouth Take As Directed for 1 dose. 350 mL 0    tamsulosin (FLOMAX) 0.4 MG capsule 24 hr capsule Take 1 capsule by mouth Daily.      zolpidem (Ambien) 5 MG tablet Take 1 tablet by mouth At Night As Needed for Sleep. 30 tablet 0     No current facility-administered medications on file prior to visit.          Physical Exam:   Temp 98.2 °F (36.8 °C) (Temporal)   Ht 188 cm (74\")   Wt 97.5 kg (215 lb)   BMI 27.60 kg/m²   MUSCULOSKELETAL:  Straight leg raising is negative.  Steve's Sign is negative.  ROM in the low back is normal.  Tenderness in the back to palpation is not observed.  NEUROLOGICAL:  Orientation, memory, attention span, language function, and cognition have been examined and are intact.  Strength is diminished in plantar and dorsiflexion of both feet.  Muscle tone is normal throughout.  Station and gait are somewhat lurching and unsteady.  This is an abrupt change from his usual gait according to his wife.  He is unable to stand on his heels or " toes.  Sensation is intact to light touch testing throughout.  Deep tendon reflexes are difficult to elicit throughout.  Jennifer signs are negative.  Coordination is intact.     Medical Decision Making     Data Review:   (All imaging studies were personally reviewed unless stated otherwise)  MRI spine dated 6/30/2025 demonstrates diffuse degenerative disc disease and facet arthropathy. There is generous stenosis at L2-3 and L3-4. Broad-based disc protrusion more prominent to the right narrows the recess on the right at L4-5. Biforaminal narrowing is noted. He does have transitional anatomy with S1 being somewhat lumbarized.  Radiologist interpreted this differently, not acknowledging the transitional anatomy.     MRI of the cervical spine demonstrates generous stenosis at C3-4 and C4-5 without adalberto signal change within the spinal cord.     MRI of the thoracic spine demonstrates some degenerative change but no cord compromise.     Electrodiagnostic studies performed by Dr. Lara suggest bilateral acute and chronic L5 radiculopathies in the setting of an underlying moderate peripheral neuropathy.     Diagnosis:   1.  Lumbar stenosis with neurogenic claudication.  2.  Lumbar radiculopathy.  3.  Peripheral neuropathy.  4.  Cervical stenosis without overt myelopathy.     Treatment Options:   I have recommended decompressive laminectomies from L2-5.  The goal of surgery would be to improve his claudication symptoms.  He would likely require aggressive physical therapy afterward given the weakness in his feet.  The nature of the procedure as well as the potential risks, complications, limitations, and alternatives to the procedure were discussed at length with the patient and the patient has agreed to proceed with surgery.  I would plan on doing this a couple of weeks after his TURP if if cleared by his urologist.  The patient's nephew is an orthopedic spine surgeon in [place].  He may call me to discuss his uncle's  care.

## 2025-07-25 ENCOUNTER — PATIENT MESSAGE (OUTPATIENT)
Dept: INTERNAL MEDICINE | Facility: CLINIC | Age: 70
End: 2025-07-25
Payer: MEDICARE

## 2025-07-25 DIAGNOSIS — G47.01 INSOMNIA DUE TO MEDICAL CONDITION: ICD-10-CM

## 2025-07-25 DIAGNOSIS — R11.0 NAUSEA: ICD-10-CM

## 2025-07-25 RX ORDER — PROMETHAZINE HYDROCHLORIDE 25 MG/1
25 TABLET ORAL EVERY 6 HOURS PRN
Qty: 20 TABLET | Refills: 0 | Status: SHIPPED | OUTPATIENT
Start: 2025-07-25

## 2025-07-25 RX ORDER — ZOLPIDEM TARTRATE 5 MG/1
5 TABLET ORAL NIGHTLY PRN
Qty: 30 TABLET | Refills: 0 | Status: SHIPPED | OUTPATIENT
Start: 2025-07-25

## 2025-07-25 NOTE — TELEPHONE ENCOUNTER
Rx Refill Note  Requested Prescriptions     Pending Prescriptions Disp Refills    zolpidem (Ambien) 5 MG tablet 30 tablet 0     Sig: Take 1 tablet by mouth At Night As Needed for Sleep.    promethazine (PHENERGAN) 25 MG tablet 20 tablet 0     Sig: Take 1 tablet by mouth Every 6 (Six) Hours As Needed for Nausea or Vomiting.      Last office visit with prescribing clinician: 5/22/2025   Last telemedicine visit with prescribing clinician: Visit date not found   Next office visit with prescribing clinician: 8/11/2025                         Would you like a call back once the refill request has been completed: [] Yes [] No    If the office needs to give you a call back, can they leave a voicemail: [] Yes [] No    Cristine Hayes LPN  07/25/25, 13:15 EDT

## 2025-07-28 ENCOUNTER — TELEPHONE (OUTPATIENT)
Dept: CASE MANAGEMENT | Facility: OTHER | Age: 70
End: 2025-07-28
Payer: MEDICARE

## 2025-08-08 ENCOUNTER — LAB (OUTPATIENT)
Dept: LAB | Facility: HOSPITAL | Age: 70
End: 2025-08-08
Payer: MEDICARE

## 2025-08-08 ENCOUNTER — READMISSION MANAGEMENT (OUTPATIENT)
Dept: CALL CENTER | Facility: HOSPITAL | Age: 70
End: 2025-08-08
Payer: MEDICARE

## 2025-08-08 ENCOUNTER — TRANSITIONAL CARE MANAGEMENT TELEPHONE ENCOUNTER (OUTPATIENT)
Dept: CALL CENTER | Facility: HOSPITAL | Age: 70
End: 2025-08-08
Payer: MEDICARE

## 2025-08-08 ENCOUNTER — OFFICE VISIT (OUTPATIENT)
Dept: INTERNAL MEDICINE | Facility: CLINIC | Age: 70
End: 2025-08-08
Payer: MEDICARE

## 2025-08-08 VITALS
SYSTOLIC BLOOD PRESSURE: 100 MMHG | BODY MASS INDEX: 27.03 KG/M2 | DIASTOLIC BLOOD PRESSURE: 62 MMHG | TEMPERATURE: 97.6 F | OXYGEN SATURATION: 96 % | WEIGHT: 210.6 LBS | HEIGHT: 74 IN | HEART RATE: 112 BPM

## 2025-08-08 DIAGNOSIS — R19.7 DIARRHEA, UNSPECIFIED TYPE: Primary | ICD-10-CM

## 2025-08-08 DIAGNOSIS — R19.7 DIARRHEA, UNSPECIFIED TYPE: ICD-10-CM

## 2025-08-08 LAB
C DIFF GDH + TOXINS A+B STL QL IA.RAPID: POSITIVE
C DIFF TOX GENS STL QL NAA+PROBE: DETECTED

## 2025-08-08 PROCEDURE — 87045 FECES CULTURE AEROBIC BACT: CPT

## 2025-08-08 PROCEDURE — 87427 SHIGA-LIKE TOXIN AG IA: CPT

## 2025-08-08 PROCEDURE — 87046 STOOL CULTR AEROBIC BACT EA: CPT

## 2025-08-08 PROCEDURE — 1160F RVW MEDS BY RX/DR IN RCRD: CPT | Performed by: STUDENT IN AN ORGANIZED HEALTH CARE EDUCATION/TRAINING PROGRAM

## 2025-08-08 PROCEDURE — 87449 NOS EACH ORGANISM AG IA: CPT

## 2025-08-08 PROCEDURE — 1125F AMNT PAIN NOTED PAIN PRSNT: CPT | Performed by: STUDENT IN AN ORGANIZED HEALTH CARE EDUCATION/TRAINING PROGRAM

## 2025-08-08 PROCEDURE — 3074F SYST BP LT 130 MM HG: CPT | Performed by: STUDENT IN AN ORGANIZED HEALTH CARE EDUCATION/TRAINING PROGRAM

## 2025-08-08 PROCEDURE — 1159F MED LIST DOCD IN RCRD: CPT | Performed by: STUDENT IN AN ORGANIZED HEALTH CARE EDUCATION/TRAINING PROGRAM

## 2025-08-08 PROCEDURE — 87493 C DIFF AMPLIFIED PROBE: CPT

## 2025-08-08 PROCEDURE — 3078F DIAST BP <80 MM HG: CPT | Performed by: STUDENT IN AN ORGANIZED HEALTH CARE EDUCATION/TRAINING PROGRAM

## 2025-08-08 PROCEDURE — 99213 OFFICE O/P EST LOW 20 MIN: CPT | Performed by: STUDENT IN AN ORGANIZED HEALTH CARE EDUCATION/TRAINING PROGRAM

## 2025-08-08 RX ORDER — LOPERAMIDE HYDROCHLORIDE 2 MG/1
2 CAPSULE ORAL 4 TIMES DAILY PRN
Qty: 28 CAPSULE | Refills: 0 | Status: SHIPPED | OUTPATIENT
Start: 2025-08-08 | End: 2025-08-22

## 2025-08-09 ENCOUNTER — DOCUMENTATION (OUTPATIENT)
Dept: INTERNAL MEDICINE | Facility: CLINIC | Age: 70
End: 2025-08-09
Payer: MEDICARE

## 2025-08-09 DIAGNOSIS — A49.8 CLOSTRIDIUM DIFFICILE INFECTION: Primary | ICD-10-CM

## 2025-08-09 RX ORDER — METRONIDAZOLE 500 MG/1
500 TABLET ORAL 3 TIMES DAILY
Qty: 30 TABLET | Refills: 0 | Status: SHIPPED | OUTPATIENT
Start: 2025-08-09 | End: 2025-08-14 | Stop reason: HOSPADM

## 2025-08-09 RX ORDER — VANCOMYCIN HYDROCHLORIDE 125 MG/1
125 CAPSULE ORAL 4 TIMES DAILY
Qty: 40 CAPSULE | Refills: 0 | Status: SHIPPED | OUTPATIENT
Start: 2025-08-09 | End: 2025-08-14 | Stop reason: HOSPADM

## 2025-08-11 ENCOUNTER — TELEPHONE (OUTPATIENT)
Dept: INTERNAL MEDICINE | Facility: CLINIC | Age: 70
End: 2025-08-11

## 2025-08-11 ENCOUNTER — APPOINTMENT (OUTPATIENT)
Dept: CT IMAGING | Facility: HOSPITAL | Age: 70
DRG: 371 | End: 2025-08-11
Payer: MEDICARE

## 2025-08-11 ENCOUNTER — APPOINTMENT (OUTPATIENT)
Dept: GENERAL RADIOLOGY | Facility: HOSPITAL | Age: 70
DRG: 371 | End: 2025-08-11
Payer: MEDICARE

## 2025-08-11 ENCOUNTER — OFFICE VISIT (OUTPATIENT)
Dept: INTERNAL MEDICINE | Facility: CLINIC | Age: 70
End: 2025-08-11
Payer: MEDICARE

## 2025-08-11 ENCOUNTER — HOSPITAL ENCOUNTER (EMERGENCY)
Facility: HOSPITAL | Age: 70
Discharge: HOME | DRG: 371 | End: 2025-08-11
Attending: STUDENT IN AN ORGANIZED HEALTH CARE EDUCATION/TRAINING PROGRAM | Admitting: STUDENT IN AN ORGANIZED HEALTH CARE EDUCATION/TRAINING PROGRAM
Payer: MEDICARE

## 2025-08-11 VITALS
RESPIRATION RATE: 18 BRPM | WEIGHT: 199.3 LBS | OXYGEN SATURATION: 98 % | HEART RATE: 80 BPM | SYSTOLIC BLOOD PRESSURE: 139 MMHG | BODY MASS INDEX: 25.58 KG/M2 | HEIGHT: 74 IN | TEMPERATURE: 97.9 F | DIASTOLIC BLOOD PRESSURE: 79 MMHG

## 2025-08-11 VITALS
OXYGEN SATURATION: 97 % | TEMPERATURE: 97.3 F | HEIGHT: 74 IN | HEART RATE: 113 BPM | DIASTOLIC BLOOD PRESSURE: 40 MMHG | SYSTOLIC BLOOD PRESSURE: 90 MMHG | WEIGHT: 199.4 LBS | BODY MASS INDEX: 25.59 KG/M2

## 2025-08-11 DIAGNOSIS — K58.0 IRRITABLE BOWEL SYNDROME WITH DIARRHEA: ICD-10-CM

## 2025-08-11 DIAGNOSIS — K52.9 COLITIS: ICD-10-CM

## 2025-08-11 DIAGNOSIS — E86.1 HYPOTENSION DUE TO HYPOVOLEMIA: ICD-10-CM

## 2025-08-11 DIAGNOSIS — E86.0 DEHYDRATION: Primary | ICD-10-CM

## 2025-08-11 DIAGNOSIS — N39.0 ACUTE UTI (URINARY TRACT INFECTION): ICD-10-CM

## 2025-08-11 DIAGNOSIS — A04.72 C. DIFFICILE COLITIS: Primary | ICD-10-CM

## 2025-08-11 LAB
ADV 40+41 DNA STL QL NAA+NON-PROBE: NOT DETECTED
ALBUMIN SERPL-MCNC: 3.3 G/DL (ref 3.5–5.2)
ALBUMIN/GLOB SERPL: 1.1 G/DL
ALP SERPL-CCNC: 111 U/L (ref 39–117)
ALT SERPL W P-5'-P-CCNC: 21 U/L (ref 1–41)
ANION GAP SERPL CALCULATED.3IONS-SCNC: 14.4 MMOL/L (ref 5–15)
AST SERPL-CCNC: 27 U/L (ref 1–40)
ASTRO TYP 1-8 RNA STL QL NAA+NON-PROBE: NOT DETECTED
BACTERIA UR QL AUTO: ABNORMAL /HPF
BASOPHILS # BLD AUTO: 0.06 10*3/MM3 (ref 0–0.2)
BASOPHILS NFR BLD AUTO: 0.5 % (ref 0–1.5)
BILIRUB SERPL-MCNC: 0.4 MG/DL (ref 0–1.2)
BILIRUB UR QL STRIP: ABNORMAL
BUN SERPL-MCNC: 29 MG/DL (ref 8–23)
BUN/CREAT SERPL: 12.9 (ref 7–25)
C CAYETANENSIS DNA STL QL NAA+NON-PROBE: NOT DETECTED
C COLI+JEJ+UPSA DNA STL QL NAA+NON-PROBE: NOT DETECTED
C DIFF TOX GENS STL QL NAA+PROBE: NOT DETECTED
CALCIUM SPEC-SCNC: 9 MG/DL (ref 8.6–10.5)
CHLORIDE SERPL-SCNC: 105 MMOL/L (ref 98–107)
CLARITY UR: ABNORMAL
CO2 SERPL-SCNC: 17.6 MMOL/L (ref 22–29)
COLOR UR: ABNORMAL
CREAT SERPL-MCNC: 2.25 MG/DL (ref 0.76–1.27)
CRP SERPL-MCNC: 9.77 MG/DL (ref 0–0.5)
CRYPTOSP DNA STL QL NAA+NON-PROBE: NOT DETECTED
D DIMER PPP FEU-MCNC: 3.81 MCGFEU/ML (ref 0–0.7)
D-LACTATE SERPL-SCNC: 1.5 MMOL/L (ref 0.5–2)
D-LACTATE SERPL-SCNC: 2.8 MMOL/L (ref 0.5–2)
DEPRECATED RDW RBC AUTO: 45.5 FL (ref 37–54)
E HISTOLYT DNA STL QL NAA+NON-PROBE: NOT DETECTED
EAEC PAA PLAS AGGR+AATA ST NAA+NON-PRB: NOT DETECTED
EC STX1+STX2 GENES STL QL NAA+NON-PROBE: NOT DETECTED
EGFRCR SERPLBLD CKD-EPI 2021: 30.6 ML/MIN/1.73
EOSINOPHIL # BLD AUTO: 0.05 10*3/MM3 (ref 0–0.4)
EOSINOPHIL NFR BLD AUTO: 0.4 % (ref 0.3–6.2)
EPEC EAE GENE STL QL NAA+NON-PROBE: NOT DETECTED
ERYTHROCYTE [DISTWIDTH] IN BLOOD BY AUTOMATED COUNT: 13.8 % (ref 12.3–15.4)
ETEC LTA+ST1A+ST1B TOX ST NAA+NON-PROBE: NOT DETECTED
FLUAV RNA RESP QL NAA+PROBE: NOT DETECTED
FLUBV RNA NPH QL NAA+NON-PROBE: NOT DETECTED
G LAMBLIA DNA STL QL NAA+NON-PROBE: NOT DETECTED
GEN 5 1HR TROPONIN T REFLEX: 68 NG/L
GLOBULIN UR ELPH-MCNC: 2.9 GM/DL
GLUCOSE SERPL-MCNC: 115 MG/DL (ref 65–99)
GLUCOSE UR STRIP-MCNC: NEGATIVE MG/DL
HCT VFR BLD AUTO: 41.5 % (ref 37.5–51)
HGB BLD-MCNC: 14.1 G/DL (ref 13–17.7)
HGB UR QL STRIP.AUTO: ABNORMAL
HOLD SPECIMEN: NORMAL
HYALINE CASTS UR QL AUTO: ABNORMAL /LPF
IMM GRANULOCYTES # BLD AUTO: 0.16 10*3/MM3 (ref 0–0.05)
IMM GRANULOCYTES NFR BLD AUTO: 1.2 % (ref 0–0.5)
KETONES UR QL STRIP: ABNORMAL
LEUKOCYTE ESTERASE UR QL STRIP.AUTO: ABNORMAL
LIPASE SERPL-CCNC: 32 U/L (ref 13–60)
LYMPHOCYTES # BLD AUTO: 0.72 10*3/MM3 (ref 0.7–3.1)
LYMPHOCYTES NFR BLD AUTO: 5.5 % (ref 19.6–45.3)
MAGNESIUM SERPL-MCNC: 2.1 MG/DL (ref 1.6–2.4)
MCH RBC QN AUTO: 30.7 PG (ref 26.6–33)
MCHC RBC AUTO-ENTMCNC: 34 G/DL (ref 31.5–35.7)
MCV RBC AUTO: 90.2 FL (ref 79–97)
MONOCYTES # BLD AUTO: 0.72 10*3/MM3 (ref 0.1–0.9)
MONOCYTES NFR BLD AUTO: 5.5 % (ref 5–12)
NEUTROPHILS NFR BLD AUTO: 11.49 10*3/MM3 (ref 1.7–7)
NEUTROPHILS NFR BLD AUTO: 86.9 % (ref 42.7–76)
NITRITE UR QL STRIP: NEGATIVE
NOROVIRUS GI+II RNA STL QL NAA+NON-PROBE: NOT DETECTED
NRBC BLD AUTO-RTO: 0 /100 WBC (ref 0–0.2)
NT-PROBNP SERPL-MCNC: 172 PG/ML (ref 0–900)
P SHIGELLOIDES DNA STL QL NAA+NON-PROBE: NOT DETECTED
PH UR STRIP.AUTO: 6 [PH] (ref 5–8)
PHOSPHATE SERPL-MCNC: 2.7 MG/DL (ref 2.5–4.5)
PLATELET # BLD AUTO: 353 10*3/MM3 (ref 140–450)
PMV BLD AUTO: 8.5 FL (ref 6–12)
POTASSIUM SERPL-SCNC: 4.1 MMOL/L (ref 3.5–5.2)
PROCALCITONIN SERPL-MCNC: 0.75 NG/ML (ref 0–0.25)
PROT SERPL-MCNC: 6.2 G/DL (ref 6–8.5)
PROT UR QL STRIP: ABNORMAL
QT INTERVAL: 352 MS
QT INTERVAL: 416 MS
QTC INTERVAL: 447 MS
QTC INTERVAL: 470 MS
RBC # BLD AUTO: 4.6 10*6/MM3 (ref 4.14–5.8)
RBC # UR STRIP: ABNORMAL /HPF
REF LAB TEST METHOD: ABNORMAL
RSV RNA RESP QL NAA+PROBE: NOT DETECTED
RVA RNA STL QL NAA+NON-PROBE: NOT DETECTED
S ENT+BONG DNA STL QL NAA+NON-PROBE: NOT DETECTED
SAPO I+II+IV+V RNA STL QL NAA+NON-PROBE: NOT DETECTED
SARS-COV-2 RNA RESP QL NAA+PROBE: NOT DETECTED
SHIGELLA SP+EIEC IPAH ST NAA+NON-PROBE: NOT DETECTED
SODIUM SERPL-SCNC: 137 MMOL/L (ref 136–145)
SP GR UR STRIP: 1.02 (ref 1–1.03)
SQUAMOUS #/AREA URNS HPF: ABNORMAL /HPF
T4 FREE SERPL-MCNC: 1.34 NG/DL (ref 0.92–1.68)
TROPONIN T % DELTA: -11
TROPONIN T NUMERIC DELTA: -8 NG/L
TROPONIN T SERPL HS-MCNC: 76 NG/L
TSH SERPL DL<=0.05 MIU/L-ACNC: 5.31 UIU/ML (ref 0.27–4.2)
UROBILINOGEN UR QL STRIP: ABNORMAL
V CHOL+PARA+VUL DNA STL QL NAA+NON-PROBE: NOT DETECTED
V CHOLERAE DNA STL QL NAA+NON-PROBE: NOT DETECTED
WBC # UR STRIP: ABNORMAL /HPF
WBC NRBC COR # BLD AUTO: 13.2 10*3/MM3 (ref 3.4–10.8)
WHOLE BLOOD HOLD COAG: NORMAL
WHOLE BLOOD HOLD SPECIMEN: NORMAL
Y ENTEROCOL DNA STL QL NAA+NON-PROBE: NOT DETECTED

## 2025-08-11 PROCEDURE — 96360 HYDRATION IV INFUSION INIT: CPT

## 2025-08-11 PROCEDURE — 83735 ASSAY OF MAGNESIUM: CPT | Performed by: STUDENT IN AN ORGANIZED HEALTH CARE EDUCATION/TRAINING PROGRAM

## 2025-08-11 PROCEDURE — 87086 URINE CULTURE/COLONY COUNT: CPT | Performed by: STUDENT IN AN ORGANIZED HEALTH CARE EDUCATION/TRAINING PROGRAM

## 2025-08-11 PROCEDURE — 71275 CT ANGIOGRAPHY CHEST: CPT

## 2025-08-11 PROCEDURE — 99285 EMERGENCY DEPT VISIT HI MDM: CPT

## 2025-08-11 PROCEDURE — 25510000001 IOPAMIDOL PER 1 ML: Performed by: STUDENT IN AN ORGANIZED HEALTH CARE EDUCATION/TRAINING PROGRAM

## 2025-08-11 PROCEDURE — 93005 ELECTROCARDIOGRAM TRACING: CPT | Performed by: STUDENT IN AN ORGANIZED HEALTH CARE EDUCATION/TRAINING PROGRAM

## 2025-08-11 PROCEDURE — 85025 COMPLETE CBC W/AUTO DIFF WBC: CPT | Performed by: STUDENT IN AN ORGANIZED HEALTH CARE EDUCATION/TRAINING PROGRAM

## 2025-08-11 PROCEDURE — 25810000003 LACTATED RINGERS SOLUTION: Performed by: STUDENT IN AN ORGANIZED HEALTH CARE EDUCATION/TRAINING PROGRAM

## 2025-08-11 PROCEDURE — 74177 CT ABD & PELVIS W/CONTRAST: CPT

## 2025-08-11 PROCEDURE — 84443 ASSAY THYROID STIM HORMONE: CPT | Performed by: STUDENT IN AN ORGANIZED HEALTH CARE EDUCATION/TRAINING PROGRAM

## 2025-08-11 PROCEDURE — 1125F AMNT PAIN NOTED PAIN PRSNT: CPT | Performed by: INTERNAL MEDICINE

## 2025-08-11 PROCEDURE — 36415 COLL VENOUS BLD VENIPUNCTURE: CPT

## 2025-08-11 PROCEDURE — 87507 IADNA-DNA/RNA PROBE TQ 12-25: CPT | Performed by: STUDENT IN AN ORGANIZED HEALTH CARE EDUCATION/TRAINING PROGRAM

## 2025-08-11 PROCEDURE — 85379 FIBRIN DEGRADATION QUANT: CPT | Performed by: STUDENT IN AN ORGANIZED HEALTH CARE EDUCATION/TRAINING PROGRAM

## 2025-08-11 PROCEDURE — 81001 URINALYSIS AUTO W/SCOPE: CPT | Performed by: STUDENT IN AN ORGANIZED HEALTH CARE EDUCATION/TRAINING PROGRAM

## 2025-08-11 PROCEDURE — 3078F DIAST BP <80 MM HG: CPT | Performed by: INTERNAL MEDICINE

## 2025-08-11 PROCEDURE — 71045 X-RAY EXAM CHEST 1 VIEW: CPT

## 2025-08-11 PROCEDURE — 1159F MED LIST DOCD IN RCRD: CPT | Performed by: INTERNAL MEDICINE

## 2025-08-11 PROCEDURE — 1170F FXNL STATUS ASSESSED: CPT | Performed by: INTERNAL MEDICINE

## 2025-08-11 PROCEDURE — 87154 CUL TYP ID BLD PTHGN 6+ TRGT: CPT | Performed by: STUDENT IN AN ORGANIZED HEALTH CARE EDUCATION/TRAINING PROGRAM

## 2025-08-11 PROCEDURE — 84100 ASSAY OF PHOSPHORUS: CPT | Performed by: STUDENT IN AN ORGANIZED HEALTH CARE EDUCATION/TRAINING PROGRAM

## 2025-08-11 PROCEDURE — 3074F SYST BP LT 130 MM HG: CPT | Performed by: INTERNAL MEDICINE

## 2025-08-11 PROCEDURE — 84145 PROCALCITONIN (PCT): CPT | Performed by: STUDENT IN AN ORGANIZED HEALTH CARE EDUCATION/TRAINING PROGRAM

## 2025-08-11 PROCEDURE — 99213 OFFICE O/P EST LOW 20 MIN: CPT | Performed by: INTERNAL MEDICINE

## 2025-08-11 PROCEDURE — 84439 ASSAY OF FREE THYROXINE: CPT | Performed by: STUDENT IN AN ORGANIZED HEALTH CARE EDUCATION/TRAINING PROGRAM

## 2025-08-11 PROCEDURE — 84484 ASSAY OF TROPONIN QUANT: CPT | Performed by: STUDENT IN AN ORGANIZED HEALTH CARE EDUCATION/TRAINING PROGRAM

## 2025-08-11 PROCEDURE — 83605 ASSAY OF LACTIC ACID: CPT | Performed by: STUDENT IN AN ORGANIZED HEALTH CARE EDUCATION/TRAINING PROGRAM

## 2025-08-11 PROCEDURE — 87040 BLOOD CULTURE FOR BACTERIA: CPT | Performed by: STUDENT IN AN ORGANIZED HEALTH CARE EDUCATION/TRAINING PROGRAM

## 2025-08-11 PROCEDURE — 83690 ASSAY OF LIPASE: CPT | Performed by: STUDENT IN AN ORGANIZED HEALTH CARE EDUCATION/TRAINING PROGRAM

## 2025-08-11 PROCEDURE — 87637 SARSCOV2&INF A&B&RSV AMP PRB: CPT | Performed by: STUDENT IN AN ORGANIZED HEALTH CARE EDUCATION/TRAINING PROGRAM

## 2025-08-11 PROCEDURE — 86140 C-REACTIVE PROTEIN: CPT | Performed by: STUDENT IN AN ORGANIZED HEALTH CARE EDUCATION/TRAINING PROGRAM

## 2025-08-11 PROCEDURE — 80053 COMPREHEN METABOLIC PANEL: CPT | Performed by: STUDENT IN AN ORGANIZED HEALTH CARE EDUCATION/TRAINING PROGRAM

## 2025-08-11 PROCEDURE — 87493 C DIFF AMPLIFIED PROBE: CPT | Performed by: STUDENT IN AN ORGANIZED HEALTH CARE EDUCATION/TRAINING PROGRAM

## 2025-08-11 PROCEDURE — 1160F RVW MEDS BY RX/DR IN RCRD: CPT | Performed by: INTERNAL MEDICINE

## 2025-08-11 PROCEDURE — 83880 ASSAY OF NATRIURETIC PEPTIDE: CPT | Performed by: STUDENT IN AN ORGANIZED HEALTH CARE EDUCATION/TRAINING PROGRAM

## 2025-08-11 RX ORDER — IOPAMIDOL 755 MG/ML
85 INJECTION, SOLUTION INTRAVASCULAR
Status: COMPLETED | OUTPATIENT
Start: 2025-08-11 | End: 2025-08-11

## 2025-08-11 RX ORDER — ONDANSETRON 4 MG/1
4 TABLET, ORALLY DISINTEGRATING ORAL 4 TIMES DAILY PRN
Qty: 12 TABLET | Refills: 0 | Status: SHIPPED | OUTPATIENT
Start: 2025-08-11 | End: 2025-08-14 | Stop reason: HOSPADM

## 2025-08-11 RX ORDER — SULFAMETHOXAZOLE AND TRIMETHOPRIM 800; 160 MG/1; MG/1
1 TABLET ORAL ONCE
Status: COMPLETED | OUTPATIENT
Start: 2025-08-11 | End: 2025-08-11

## 2025-08-11 RX ORDER — SULFAMETHOXAZOLE AND TRIMETHOPRIM 800; 160 MG/1; MG/1
1 TABLET ORAL 2 TIMES DAILY
Qty: 14 TABLET | Refills: 0 | Status: SHIPPED | OUTPATIENT
Start: 2025-08-11 | End: 2025-08-14 | Stop reason: HOSPADM

## 2025-08-11 RX ORDER — VANCOMYCIN HYDROCHLORIDE 125 MG/1
125 CAPSULE ORAL ONCE
Status: COMPLETED | OUTPATIENT
Start: 2025-08-11 | End: 2025-08-11

## 2025-08-11 RX ORDER — SODIUM CHLORIDE 0.9 % (FLUSH) 0.9 %
10 SYRINGE (ML) INJECTION AS NEEDED
Status: DISCONTINUED | OUTPATIENT
Start: 2025-08-11 | End: 2025-08-11 | Stop reason: HOSPADM

## 2025-08-11 RX ADMIN — VANCOMYCIN HYDROCHLORIDE 125 MG: 125 CAPSULE ORAL at 18:08

## 2025-08-11 RX ADMIN — IOPAMIDOL 85 ML: 755 INJECTION, SOLUTION INTRAVENOUS at 15:54

## 2025-08-11 RX ADMIN — SULFAMETHOXAZOLE AND TRIMETHOPRIM 1 TABLET: 800; 160 TABLET ORAL at 16:05

## 2025-08-11 RX ADMIN — SODIUM CHLORIDE, POTASSIUM CHLORIDE, SODIUM LACTATE AND CALCIUM CHLORIDE 2000 ML: 600; 310; 30; 20 INJECTION, SOLUTION INTRAVENOUS at 14:07

## 2025-08-12 ENCOUNTER — HOSPITAL ENCOUNTER (INPATIENT)
Facility: HOSPITAL | Age: 70
LOS: 2 days | Discharge: HOME | DRG: 371 | End: 2025-08-14
Attending: EMERGENCY MEDICINE | Admitting: STUDENT IN AN ORGANIZED HEALTH CARE EDUCATION/TRAINING PROGRAM
Payer: MEDICARE

## 2025-08-12 ENCOUNTER — RESULTS FOLLOW-UP (OUTPATIENT)
Dept: EMERGENCY DEPT | Facility: HOSPITAL | Age: 70
End: 2025-08-12
Payer: MEDICARE

## 2025-08-12 DIAGNOSIS — Z90.79 S/P TURP (STATUS POST TRANSURETHRAL RESECTION OF PROSTATE): ICD-10-CM

## 2025-08-12 DIAGNOSIS — R78.81 POSITIVE BLOOD CULTURE: Primary | ICD-10-CM

## 2025-08-12 DIAGNOSIS — A04.72 C. DIFFICILE DIARRHEA: ICD-10-CM

## 2025-08-12 DIAGNOSIS — Z74.09 IMPAIRED FUNCTIONAL MOBILITY, BALANCE, GAIT, AND ENDURANCE: ICD-10-CM

## 2025-08-12 PROBLEM — A41.9 SEPSIS WITHOUT SEPTIC SHOCK: Status: ACTIVE | Noted: 2025-08-12

## 2025-08-12 LAB
ALBUMIN SERPL-MCNC: 3.4 G/DL (ref 3.5–5.2)
ALBUMIN/GLOB SERPL: 1.2 G/DL
ALP SERPL-CCNC: 117 U/L (ref 39–117)
ALT SERPL W P-5'-P-CCNC: 23 U/L (ref 1–41)
ANION GAP SERPL CALCULATED.3IONS-SCNC: 12.5 MMOL/L (ref 5–15)
AST SERPL-CCNC: 35 U/L (ref 1–40)
BACTERIA SPEC CULT: NORMAL
BACTERIA SPEC CULT: NORMAL
BASOPHILS # BLD AUTO: 0.06 10*3/MM3 (ref 0–0.2)
BASOPHILS NFR BLD AUTO: 0.5 % (ref 0–1.5)
BILIRUB SERPL-MCNC: 0.3 MG/DL (ref 0–1.2)
BUN SERPL-MCNC: 21 MG/DL (ref 8–23)
BUN/CREAT SERPL: 10.8 (ref 7–25)
CALCIUM SPEC-SCNC: 8.7 MG/DL (ref 8.6–10.5)
CAMPYLOBACTER STL CULT: NORMAL
CHLORIDE SERPL-SCNC: 103 MMOL/L (ref 98–107)
CO2 SERPL-SCNC: 18.5 MMOL/L (ref 22–29)
CREAT SERPL-MCNC: 1.95 MG/DL (ref 0.76–1.27)
D-LACTATE SERPL-SCNC: 1.7 MMOL/L (ref 0.5–2)
DEPRECATED RDW RBC AUTO: 44.9 FL (ref 37–54)
E COLI SXT STL QL IA: NEGATIVE
EGFRCR SERPLBLD CKD-EPI 2021: 36.3 ML/MIN/1.73
EOSINOPHIL # BLD AUTO: 0.1 10*3/MM3 (ref 0–0.4)
EOSINOPHIL NFR BLD AUTO: 0.9 % (ref 0.3–6.2)
ERYTHROCYTE [DISTWIDTH] IN BLOOD BY AUTOMATED COUNT: 13.8 % (ref 12.3–15.4)
GLOBULIN UR ELPH-MCNC: 2.8 GM/DL
GLUCOSE SERPL-MCNC: 111 MG/DL (ref 65–99)
HCT VFR BLD AUTO: 41 % (ref 37.5–51)
HGB BLD-MCNC: 13.8 G/DL (ref 13–17.7)
HOLD SPECIMEN: NORMAL
IMM GRANULOCYTES # BLD AUTO: 0.22 10*3/MM3 (ref 0–0.05)
IMM GRANULOCYTES NFR BLD AUTO: 2 % (ref 0–0.5)
LYMPHOCYTES # BLD AUTO: 1.12 10*3/MM3 (ref 0.7–3.1)
LYMPHOCYTES NFR BLD AUTO: 10.1 % (ref 19.6–45.3)
MCH RBC QN AUTO: 29.9 PG (ref 26.6–33)
MCHC RBC AUTO-ENTMCNC: 33.7 G/DL (ref 31.5–35.7)
MCV RBC AUTO: 88.7 FL (ref 79–97)
MONOCYTES # BLD AUTO: 0.76 10*3/MM3 (ref 0.1–0.9)
MONOCYTES NFR BLD AUTO: 6.9 % (ref 5–12)
NEUTROPHILS NFR BLD AUTO: 79.6 % (ref 42.7–76)
NEUTROPHILS NFR BLD AUTO: 8.82 10*3/MM3 (ref 1.7–7)
NRBC BLD AUTO-RTO: 0 /100 WBC (ref 0–0.2)
PLATELET # BLD AUTO: 354 10*3/MM3 (ref 140–450)
PMV BLD AUTO: 8.6 FL (ref 6–12)
POTASSIUM SERPL-SCNC: 2.9 MMOL/L (ref 3.5–5.2)
PROCALCITONIN SERPL-MCNC: 0.59 NG/ML (ref 0–0.25)
PROT SERPL-MCNC: 6.2 G/DL (ref 6–8.5)
RBC # BLD AUTO: 4.62 10*6/MM3 (ref 4.14–5.8)
SALM + SHIG STL CULT: NORMAL
SODIUM SERPL-SCNC: 134 MMOL/L (ref 136–145)
WBC NRBC COR # BLD AUTO: 11.08 10*3/MM3 (ref 3.4–10.8)
WHOLE BLOOD HOLD COAG: NORMAL
WHOLE BLOOD HOLD SPECIMEN: NORMAL

## 2025-08-12 PROCEDURE — 83605 ASSAY OF LACTIC ACID: CPT | Performed by: EMERGENCY MEDICINE

## 2025-08-12 PROCEDURE — 85025 COMPLETE CBC W/AUTO DIFF WBC: CPT | Performed by: EMERGENCY MEDICINE

## 2025-08-12 PROCEDURE — 99223 1ST HOSP IP/OBS HIGH 75: CPT

## 2025-08-12 PROCEDURE — 87040 BLOOD CULTURE FOR BACTERIA: CPT | Performed by: EMERGENCY MEDICINE

## 2025-08-12 PROCEDURE — 25010000002 PIPERACILLIN SOD-TAZOBACTAM PER 1 G: Performed by: EMERGENCY MEDICINE

## 2025-08-12 PROCEDURE — 84145 PROCALCITONIN (PCT): CPT | Performed by: EMERGENCY MEDICINE

## 2025-08-12 PROCEDURE — 99285 EMERGENCY DEPT VISIT HI MDM: CPT

## 2025-08-12 PROCEDURE — 80053 COMPREHEN METABOLIC PANEL: CPT | Performed by: EMERGENCY MEDICINE

## 2025-08-12 PROCEDURE — 36415 COLL VENOUS BLD VENIPUNCTURE: CPT

## 2025-08-12 PROCEDURE — 25010000002 ENOXAPARIN PER 10 MG

## 2025-08-12 PROCEDURE — 25810000003 LACTATED RINGERS PER 1000 ML

## 2025-08-12 RX ORDER — ATORVASTATIN CALCIUM 20 MG/1
20 TABLET, FILM COATED ORAL DAILY
Status: DISCONTINUED | OUTPATIENT
Start: 2025-08-13 | End: 2025-08-12

## 2025-08-12 RX ORDER — ATORVASTATIN CALCIUM 20 MG/1
20 TABLET, FILM COATED ORAL NIGHTLY
Status: DISCONTINUED | OUTPATIENT
Start: 2025-08-13 | End: 2025-08-14 | Stop reason: HOSPADM

## 2025-08-12 RX ORDER — PROMETHAZINE HYDROCHLORIDE 25 MG/1
25 TABLET ORAL EVERY 6 HOURS PRN
Status: DISCONTINUED | OUTPATIENT
Start: 2025-08-12 | End: 2025-08-14 | Stop reason: HOSPADM

## 2025-08-12 RX ORDER — ONDANSETRON 2 MG/ML
4 INJECTION INTRAMUSCULAR; INTRAVENOUS EVERY 6 HOURS PRN
Status: DISCONTINUED | OUTPATIENT
Start: 2025-08-12 | End: 2025-08-14 | Stop reason: HOSPADM

## 2025-08-12 RX ORDER — POTASSIUM CHLORIDE 750 MG/1
40 CAPSULE, EXTENDED RELEASE ORAL ONCE
Status: COMPLETED | OUTPATIENT
Start: 2025-08-12 | End: 2025-08-12

## 2025-08-12 RX ORDER — ZOLPIDEM TARTRATE 5 MG/1
5 TABLET ORAL NIGHTLY PRN
Status: DISCONTINUED | OUTPATIENT
Start: 2025-08-12 | End: 2025-08-14 | Stop reason: HOSPADM

## 2025-08-12 RX ORDER — BUPROPION HYDROCHLORIDE 150 MG/1
300 TABLET ORAL DAILY
Status: DISCONTINUED | OUTPATIENT
Start: 2025-08-13 | End: 2025-08-12

## 2025-08-12 RX ORDER — ACETAMINOPHEN 650 MG/1
650 SUPPOSITORY RECTAL EVERY 4 HOURS PRN
Status: DISCONTINUED | OUTPATIENT
Start: 2025-08-12 | End: 2025-08-14 | Stop reason: HOSPADM

## 2025-08-12 RX ORDER — SODIUM CHLORIDE 0.9 % (FLUSH) 0.9 %
10 SYRINGE (ML) INJECTION AS NEEDED
Status: DISCONTINUED | OUTPATIENT
Start: 2025-08-12 | End: 2025-08-14 | Stop reason: HOSPADM

## 2025-08-12 RX ORDER — MORPHINE SULFATE 2 MG/ML
1 INJECTION, SOLUTION INTRAMUSCULAR; INTRAVENOUS EVERY 4 HOURS PRN
Status: DISCONTINUED | OUTPATIENT
Start: 2025-08-12 | End: 2025-08-14 | Stop reason: HOSPADM

## 2025-08-12 RX ORDER — ENOXAPARIN SODIUM 100 MG/ML
40 INJECTION SUBCUTANEOUS NIGHTLY
Status: DISCONTINUED | OUTPATIENT
Start: 2025-08-12 | End: 2025-08-14 | Stop reason: HOSPADM

## 2025-08-12 RX ORDER — FAMOTIDINE 20 MG/1
40 TABLET, FILM COATED ORAL DAILY
Status: DISCONTINUED | OUTPATIENT
Start: 2025-08-13 | End: 2025-08-14 | Stop reason: HOSPADM

## 2025-08-12 RX ORDER — VANCOMYCIN HYDROCHLORIDE 125 MG/1
125 CAPSULE ORAL ONCE
Status: COMPLETED | OUTPATIENT
Start: 2025-08-12 | End: 2025-08-12

## 2025-08-12 RX ORDER — ACETAMINOPHEN 325 MG/1
650 TABLET ORAL EVERY 4 HOURS PRN
Status: DISCONTINUED | OUTPATIENT
Start: 2025-08-12 | End: 2025-08-14 | Stop reason: HOSPADM

## 2025-08-12 RX ORDER — SODIUM CHLORIDE 0.9 % (FLUSH) 0.9 %
10 SYRINGE (ML) INJECTION EVERY 12 HOURS SCHEDULED
Status: DISCONTINUED | OUTPATIENT
Start: 2025-08-12 | End: 2025-08-14 | Stop reason: HOSPADM

## 2025-08-12 RX ORDER — SODIUM CHLORIDE, SODIUM LACTATE, POTASSIUM CHLORIDE, CALCIUM CHLORIDE 600; 310; 30; 20 MG/100ML; MG/100ML; MG/100ML; MG/100ML
100 INJECTION, SOLUTION INTRAVENOUS CONTINUOUS
Status: ACTIVE | OUTPATIENT
Start: 2025-08-12 | End: 2025-08-13

## 2025-08-12 RX ORDER — ACETAMINOPHEN 160 MG/5ML
650 SOLUTION ORAL EVERY 4 HOURS PRN
Status: DISCONTINUED | OUTPATIENT
Start: 2025-08-12 | End: 2025-08-14 | Stop reason: HOSPADM

## 2025-08-12 RX ORDER — NALOXONE HCL 0.4 MG/ML
0.4 VIAL (ML) INJECTION
Status: DISCONTINUED | OUTPATIENT
Start: 2025-08-12 | End: 2025-08-14 | Stop reason: HOSPADM

## 2025-08-12 RX ORDER — VANCOMYCIN HYDROCHLORIDE 125 MG/1
125 CAPSULE ORAL EVERY 6 HOURS SCHEDULED
Status: DISCONTINUED | OUTPATIENT
Start: 2025-08-13 | End: 2025-08-14

## 2025-08-12 RX ORDER — BUPROPION HYDROCHLORIDE 150 MG/1
300 TABLET ORAL NIGHTLY
Status: DISCONTINUED | OUTPATIENT
Start: 2025-08-13 | End: 2025-08-14 | Stop reason: HOSPADM

## 2025-08-12 RX ORDER — SODIUM CHLORIDE 9 MG/ML
40 INJECTION, SOLUTION INTRAVENOUS AS NEEDED
Status: DISCONTINUED | OUTPATIENT
Start: 2025-08-12 | End: 2025-08-14 | Stop reason: HOSPADM

## 2025-08-12 RX ORDER — NITROGLYCERIN 0.4 MG/1
0.4 TABLET SUBLINGUAL
Status: DISCONTINUED | OUTPATIENT
Start: 2025-08-12 | End: 2025-08-14 | Stop reason: HOSPADM

## 2025-08-12 RX ADMIN — ATORVASTATIN CALCIUM 20 MG: 20 TABLET, FILM COATED ORAL at 23:26

## 2025-08-12 RX ADMIN — SERTRALINE HYDROCHLORIDE 50 MG: 50 TABLET, FILM COATED ORAL at 23:26

## 2025-08-12 RX ADMIN — BUPROPION HYDROCHLORIDE 300 MG: 150 TABLET, EXTENDED RELEASE ORAL at 23:26

## 2025-08-12 RX ADMIN — ENOXAPARIN SODIUM 40 MG: 100 INJECTION SUBCUTANEOUS at 23:27

## 2025-08-12 RX ADMIN — VANCOMYCIN HYDROCHLORIDE 125 MG: 125 CAPSULE ORAL at 22:34

## 2025-08-12 RX ADMIN — PIPERACILLIN AND TAZOBACTAM 3.38 G: 3; .375 INJECTION, POWDER, FOR SOLUTION INTRAVENOUS at 21:16

## 2025-08-12 RX ADMIN — SODIUM CHLORIDE, POTASSIUM CHLORIDE, SODIUM LACTATE AND CALCIUM CHLORIDE 100 ML/HR: 600; 310; 30; 20 INJECTION, SOLUTION INTRAVENOUS at 23:26

## 2025-08-12 RX ADMIN — POTASSIUM CHLORIDE 40 MEQ: 750 CAPSULE, EXTENDED RELEASE ORAL at 23:26

## 2025-08-12 RX ADMIN — Medication 10 ML: at 23:27

## 2025-08-13 PROBLEM — E43 SEVERE PROTEIN-CALORIE MALNUTRITION: Status: ACTIVE | Noted: 2025-08-13

## 2025-08-13 LAB
ALBUMIN SERPL-MCNC: 2.8 G/DL (ref 3.5–5.2)
ALBUMIN/GLOB SERPL: 1.4 G/DL
ALP SERPL-CCNC: 99 U/L (ref 39–117)
ALT SERPL W P-5'-P-CCNC: 14 U/L (ref 1–41)
ANION GAP SERPL CALCULATED.3IONS-SCNC: 10 MMOL/L (ref 5–15)
AST SERPL-CCNC: 28 U/L (ref 1–40)
BACTERIA SPEC AEROBE CULT: NO GROWTH
BASOPHILS # BLD AUTO: 0.05 10*3/MM3 (ref 0–0.2)
BASOPHILS NFR BLD AUTO: 0.6 % (ref 0–1.5)
BILIRUB SERPL-MCNC: 0.2 MG/DL (ref 0–1.2)
BUN SERPL-MCNC: 18.6 MG/DL (ref 8–23)
BUN/CREAT SERPL: 9.3 (ref 7–25)
CALCIUM SPEC-SCNC: 7.8 MG/DL (ref 8.6–10.5)
CHLORIDE SERPL-SCNC: 107 MMOL/L (ref 98–107)
CO2 SERPL-SCNC: 18 MMOL/L (ref 22–29)
CREAT SERPL-MCNC: 1.99 MG/DL (ref 0.76–1.27)
DEPRECATED RDW RBC AUTO: 46.4 FL (ref 37–54)
EGFRCR SERPLBLD CKD-EPI 2021: 35.5 ML/MIN/1.73
EOSINOPHIL # BLD AUTO: 0.13 10*3/MM3 (ref 0–0.4)
EOSINOPHIL NFR BLD AUTO: 1.5 % (ref 0.3–6.2)
ERYTHROCYTE [DISTWIDTH] IN BLOOD BY AUTOMATED COUNT: 14.1 % (ref 12.3–15.4)
GLOBULIN UR ELPH-MCNC: 2 GM/DL
GLUCOSE SERPL-MCNC: 116 MG/DL (ref 65–99)
HCT VFR BLD AUTO: 34.8 % (ref 37.5–51)
HGB BLD-MCNC: 11.6 G/DL (ref 13–17.7)
IMM GRANULOCYTES # BLD AUTO: 0.21 10*3/MM3 (ref 0–0.05)
IMM GRANULOCYTES NFR BLD AUTO: 2.3 % (ref 0–0.5)
LYMPHOCYTES # BLD AUTO: 1.15 10*3/MM3 (ref 0.7–3.1)
LYMPHOCYTES NFR BLD AUTO: 12.8 % (ref 19.6–45.3)
MAGNESIUM SERPL-MCNC: 2 MG/DL (ref 1.6–2.4)
MCH RBC QN AUTO: 30 PG (ref 26.6–33)
MCHC RBC AUTO-ENTMCNC: 33.3 G/DL (ref 31.5–35.7)
MCV RBC AUTO: 89.9 FL (ref 79–97)
MONOCYTES # BLD AUTO: 0.72 10*3/MM3 (ref 0.1–0.9)
MONOCYTES NFR BLD AUTO: 8 % (ref 5–12)
NEUTROPHILS NFR BLD AUTO: 6.69 10*3/MM3 (ref 1.7–7)
NEUTROPHILS NFR BLD AUTO: 74.8 % (ref 42.7–76)
NRBC BLD AUTO-RTO: 0 /100 WBC (ref 0–0.2)
PHOSPHATE SERPL-MCNC: 3.5 MG/DL (ref 2.5–4.5)
PLATELET # BLD AUTO: 308 10*3/MM3 (ref 140–450)
PMV BLD AUTO: 8.7 FL (ref 6–12)
POTASSIUM SERPL-SCNC: 3.3 MMOL/L (ref 3.5–5.2)
POTASSIUM SERPL-SCNC: 4.1 MMOL/L (ref 3.5–5.2)
PROT SERPL-MCNC: 4.8 G/DL (ref 6–8.5)
RBC # BLD AUTO: 3.87 10*6/MM3 (ref 4.14–5.8)
SODIUM SERPL-SCNC: 135 MMOL/L (ref 136–145)
WBC NRBC COR # BLD AUTO: 8.95 10*3/MM3 (ref 3.4–10.8)

## 2025-08-13 PROCEDURE — 80053 COMPREHEN METABOLIC PANEL: CPT

## 2025-08-13 PROCEDURE — 85025 COMPLETE CBC W/AUTO DIFF WBC: CPT

## 2025-08-13 PROCEDURE — 83735 ASSAY OF MAGNESIUM: CPT | Performed by: STUDENT IN AN ORGANIZED HEALTH CARE EDUCATION/TRAINING PROGRAM

## 2025-08-13 PROCEDURE — 97165 OT EVAL LOW COMPLEX 30 MIN: CPT

## 2025-08-13 PROCEDURE — 25010000002 ONDANSETRON PER 1 MG

## 2025-08-13 PROCEDURE — 84132 ASSAY OF SERUM POTASSIUM: CPT

## 2025-08-13 PROCEDURE — 99232 SBSQ HOSP IP/OBS MODERATE 35: CPT | Performed by: STUDENT IN AN ORGANIZED HEALTH CARE EDUCATION/TRAINING PROGRAM

## 2025-08-13 PROCEDURE — 25010000002 PIPERACILLIN SOD-TAZOBACTAM PER 1 G

## 2025-08-13 PROCEDURE — 25010000002 ENOXAPARIN PER 10 MG

## 2025-08-13 PROCEDURE — 25010000002 MORPHINE PER 10 MG

## 2025-08-13 PROCEDURE — 97162 PT EVAL MOD COMPLEX 30 MIN: CPT

## 2025-08-13 PROCEDURE — 84100 ASSAY OF PHOSPHORUS: CPT | Performed by: STUDENT IN AN ORGANIZED HEALTH CARE EDUCATION/TRAINING PROGRAM

## 2025-08-13 RX ORDER — POTASSIUM CHLORIDE 1500 MG/1
40 TABLET, EXTENDED RELEASE ORAL EVERY 4 HOURS
Status: DISPENSED | OUTPATIENT
Start: 2025-08-13 | End: 2025-08-13

## 2025-08-13 RX ADMIN — BUPROPION HYDROCHLORIDE 300 MG: 150 TABLET, EXTENDED RELEASE ORAL at 20:06

## 2025-08-13 RX ADMIN — VANCOMYCIN HYDROCHLORIDE 125 MG: 125 CAPSULE ORAL at 04:40

## 2025-08-13 RX ADMIN — PIPERACILLIN AND TAZOBACTAM 3.38 G: 3; .375 INJECTION, POWDER, FOR SOLUTION INTRAVENOUS at 04:40

## 2025-08-13 RX ADMIN — ONDANSETRON 4 MG: 2 INJECTION INTRAMUSCULAR; INTRAVENOUS at 18:00

## 2025-08-13 RX ADMIN — Medication 10 ML: at 20:07

## 2025-08-13 RX ADMIN — ZOLPIDEM TARTRATE 5 MG: 5 TABLET ORAL at 23:10

## 2025-08-13 RX ADMIN — ATORVASTATIN CALCIUM 20 MG: 20 TABLET, FILM COATED ORAL at 20:07

## 2025-08-13 RX ADMIN — ONDANSETRON 4 MG: 2 INJECTION INTRAMUSCULAR; INTRAVENOUS at 03:22

## 2025-08-13 RX ADMIN — FAMOTIDINE 40 MG: 20 TABLET, FILM COATED ORAL at 08:03

## 2025-08-13 RX ADMIN — VANCOMYCIN HYDROCHLORIDE 125 MG: 125 CAPSULE ORAL at 17:32

## 2025-08-13 RX ADMIN — SERTRALINE HYDROCHLORIDE 50 MG: 50 TABLET, FILM COATED ORAL at 20:07

## 2025-08-13 RX ADMIN — MORPHINE SULFATE 1 MG: 2 INJECTION, SOLUTION INTRAMUSCULAR; INTRAVENOUS at 03:23

## 2025-08-13 RX ADMIN — ONDANSETRON 4 MG: 2 INJECTION INTRAMUSCULAR; INTRAVENOUS at 10:31

## 2025-08-13 RX ADMIN — VANCOMYCIN HYDROCHLORIDE 125 MG: 125 CAPSULE ORAL at 12:17

## 2025-08-13 RX ADMIN — Medication 10 ML: at 08:03

## 2025-08-13 RX ADMIN — VANCOMYCIN HYDROCHLORIDE 125 MG: 125 CAPSULE ORAL at 23:10

## 2025-08-13 RX ADMIN — POTASSIUM CHLORIDE 40 MEQ: 1500 TABLET, EXTENDED RELEASE ORAL at 10:31

## 2025-08-13 RX ADMIN — ENOXAPARIN SODIUM 40 MG: 100 INJECTION SUBCUTANEOUS at 20:07

## 2025-08-14 ENCOUNTER — READMISSION MANAGEMENT (OUTPATIENT)
Dept: CALL CENTER | Facility: HOSPITAL | Age: 70
End: 2025-08-14
Payer: MEDICARE

## 2025-08-14 VITALS
BODY MASS INDEX: 26.24 KG/M2 | RESPIRATION RATE: 17 BRPM | SYSTOLIC BLOOD PRESSURE: 142 MMHG | WEIGHT: 204.5 LBS | DIASTOLIC BLOOD PRESSURE: 81 MMHG | TEMPERATURE: 98.2 F | HEIGHT: 74 IN | HEART RATE: 74 BPM | OXYGEN SATURATION: 94 %

## 2025-08-14 PROBLEM — A41.9 SEPSIS WITHOUT SEPTIC SHOCK: Status: RESOLVED | Noted: 2025-08-12 | Resolved: 2025-08-14

## 2025-08-14 LAB
ANION GAP SERPL CALCULATED.3IONS-SCNC: 10 MMOL/L (ref 5–15)
BUN SERPL-MCNC: 14.4 MG/DL (ref 8–23)
BUN/CREAT SERPL: 6.5 (ref 7–25)
CALCIUM SPEC-SCNC: 8 MG/DL (ref 8.6–10.5)
CHLORIDE SERPL-SCNC: 110 MMOL/L (ref 98–107)
CO2 SERPL-SCNC: 18 MMOL/L (ref 22–29)
CREAT SERPL-MCNC: 2.21 MG/DL (ref 0.76–1.27)
EGFRCR SERPLBLD CKD-EPI 2021: 31.3 ML/MIN/1.73
GLUCOSE SERPL-MCNC: 89 MG/DL (ref 65–99)
MAGNESIUM SERPL-MCNC: 1.9 MG/DL (ref 1.6–2.4)
PHOSPHATE SERPL-MCNC: 3.2 MG/DL (ref 2.5–4.5)
POTASSIUM SERPL-SCNC: 3.5 MMOL/L (ref 3.5–5.2)
SODIUM SERPL-SCNC: 138 MMOL/L (ref 136–145)

## 2025-08-14 PROCEDURE — 80048 BASIC METABOLIC PNL TOTAL CA: CPT | Performed by: STUDENT IN AN ORGANIZED HEALTH CARE EDUCATION/TRAINING PROGRAM

## 2025-08-14 PROCEDURE — 83735 ASSAY OF MAGNESIUM: CPT | Performed by: STUDENT IN AN ORGANIZED HEALTH CARE EDUCATION/TRAINING PROGRAM

## 2025-08-14 PROCEDURE — 99239 HOSP IP/OBS DSCHRG MGMT >30: CPT | Performed by: STUDENT IN AN ORGANIZED HEALTH CARE EDUCATION/TRAINING PROGRAM

## 2025-08-14 PROCEDURE — 84100 ASSAY OF PHOSPHORUS: CPT | Performed by: STUDENT IN AN ORGANIZED HEALTH CARE EDUCATION/TRAINING PROGRAM

## 2025-08-14 RX ORDER — VANCOMYCIN HYDROCHLORIDE 125 MG/1
125 CAPSULE ORAL 2 TIMES DAILY
Status: DISCONTINUED | OUTPATIENT
Start: 2025-08-27 | End: 2025-08-14 | Stop reason: HOSPADM

## 2025-08-14 RX ORDER — TERAZOSIN 5 MG/1
5 CAPSULE ORAL NIGHTLY
Status: DISCONTINUED | OUTPATIENT
Start: 2025-08-14 | End: 2025-08-14 | Stop reason: HOSPADM

## 2025-08-14 RX ORDER — VANCOMYCIN HYDROCHLORIDE 125 MG/1
125 CAPSULE ORAL EVERY 6 HOURS SCHEDULED
Status: DISCONTINUED | OUTPATIENT
Start: 2025-08-14 | End: 2025-08-14 | Stop reason: HOSPADM

## 2025-08-14 RX ORDER — POTASSIUM CHLORIDE 1500 MG/1
40 TABLET, EXTENDED RELEASE ORAL EVERY 4 HOURS
Status: COMPLETED | OUTPATIENT
Start: 2025-08-14 | End: 2025-08-14

## 2025-08-14 RX ORDER — POTASSIUM CHLORIDE 750 MG/1
10 CAPSULE, EXTENDED RELEASE ORAL DAILY
Status: DISCONTINUED | OUTPATIENT
Start: 2025-08-15 | End: 2025-08-14 | Stop reason: HOSPADM

## 2025-08-14 RX ORDER — VANCOMYCIN HYDROCHLORIDE 125 MG/1
125 CAPSULE ORAL EVERY 6 HOURS SCHEDULED
Status: DISCONTINUED | OUTPATIENT
Start: 2025-08-14 | End: 2025-08-14

## 2025-08-14 RX ORDER — POTASSIUM CHLORIDE 750 MG/1
10 CAPSULE, EXTENDED RELEASE ORAL DAILY
Qty: 30 CAPSULE | Refills: 0 | Status: SHIPPED | OUTPATIENT
Start: 2025-08-15 | End: 2025-09-14

## 2025-08-14 RX ORDER — VANCOMYCIN HYDROCHLORIDE 125 MG/1
125 CAPSULE ORAL EVERY 6 HOURS SCHEDULED
Qty: 50 CAPSULE | Refills: 0 | Status: SHIPPED | OUTPATIENT
Start: 2025-08-14 | End: 2025-08-27

## 2025-08-14 RX ORDER — VANCOMYCIN HYDROCHLORIDE 125 MG/1
125 CAPSULE ORAL 2 TIMES DAILY
Qty: 28 CAPSULE | Refills: 0 | Status: SHIPPED | OUTPATIENT
Start: 2025-08-27 | End: 2025-09-10

## 2025-08-14 RX ORDER — AMLODIPINE BESYLATE 10 MG/1
10 TABLET ORAL NIGHTLY
Status: DISCONTINUED | OUTPATIENT
Start: 2025-08-14 | End: 2025-08-14 | Stop reason: HOSPADM

## 2025-08-14 RX ADMIN — POTASSIUM CHLORIDE 40 MEQ: 1500 TABLET, EXTENDED RELEASE ORAL at 05:45

## 2025-08-14 RX ADMIN — VANCOMYCIN HYDROCHLORIDE 125 MG: 125 CAPSULE ORAL at 12:18

## 2025-08-14 RX ADMIN — Medication 10 ML: at 08:58

## 2025-08-14 RX ADMIN — POTASSIUM CHLORIDE 40 MEQ: 1500 TABLET, EXTENDED RELEASE ORAL at 10:14

## 2025-08-14 RX ADMIN — VANCOMYCIN HYDROCHLORIDE 125 MG: 125 CAPSULE ORAL at 05:37

## 2025-08-15 ENCOUNTER — TRANSITIONAL CARE MANAGEMENT TELEPHONE ENCOUNTER (OUTPATIENT)
Dept: CALL CENTER | Facility: HOSPITAL | Age: 70
End: 2025-08-15
Payer: MEDICARE

## 2025-08-16 LAB — BACTERIA SPEC AEROBE CULT: NORMAL

## 2025-08-17 LAB
BACTERIA SPEC AEROBE CULT: NORMAL
BACTERIA SPEC AEROBE CULT: NORMAL

## 2025-08-18 LAB
BACTERIA SPEC AEROBE CULT: NORMAL
GRAM STN SPEC: NORMAL

## 2025-08-19 ENCOUNTER — NURSE TRIAGE (OUTPATIENT)
Dept: CALL CENTER | Facility: HOSPITAL | Age: 70
End: 2025-08-19
Payer: MEDICARE

## 2025-08-20 LAB
QT INTERVAL: 352 MS
QT INTERVAL: 416 MS
QTC INTERVAL: 447 MS
QTC INTERVAL: 470 MS

## 2025-08-21 ENCOUNTER — LAB (OUTPATIENT)
Dept: LAB | Facility: HOSPITAL | Age: 70
End: 2025-08-21
Payer: MEDICARE

## 2025-08-21 ENCOUNTER — OFFICE VISIT (OUTPATIENT)
Dept: INTERNAL MEDICINE | Facility: CLINIC | Age: 70
End: 2025-08-21
Payer: MEDICARE

## 2025-08-21 VITALS
OXYGEN SATURATION: 97 % | WEIGHT: 202.2 LBS | TEMPERATURE: 97.8 F | HEIGHT: 74 IN | HEART RATE: 96 BPM | BODY MASS INDEX: 25.95 KG/M2 | DIASTOLIC BLOOD PRESSURE: 66 MMHG | SYSTOLIC BLOOD PRESSURE: 112 MMHG

## 2025-08-21 DIAGNOSIS — N18.32 STAGE 3B CHRONIC KIDNEY DISEASE: Primary | ICD-10-CM

## 2025-08-28 ENCOUNTER — READMISSION MANAGEMENT (OUTPATIENT)
Dept: CALL CENTER | Facility: HOSPITAL | Age: 70
End: 2025-08-28
Payer: MEDICARE

## 2025-08-28 DIAGNOSIS — G47.01 INSOMNIA DUE TO MEDICAL CONDITION: ICD-10-CM

## 2025-08-28 DIAGNOSIS — R11.0 NAUSEA: ICD-10-CM

## 2025-08-28 RX ORDER — ZOLPIDEM TARTRATE 5 MG/1
5 TABLET ORAL NIGHTLY PRN
Qty: 30 TABLET | Refills: 2 | Status: SHIPPED | OUTPATIENT
Start: 2025-08-28

## 2025-08-28 RX ORDER — PROMETHAZINE HYDROCHLORIDE 25 MG/1
25 TABLET ORAL EVERY 6 HOURS PRN
Qty: 30 TABLET | Refills: 1 | Status: SHIPPED | OUTPATIENT
Start: 2025-08-28